# Patient Record
Sex: FEMALE | Race: WHITE | HISPANIC OR LATINO | Employment: UNEMPLOYED | URBAN - METROPOLITAN AREA
[De-identification: names, ages, dates, MRNs, and addresses within clinical notes are randomized per-mention and may not be internally consistent; named-entity substitution may affect disease eponyms.]

---

## 2017-09-18 ENCOUNTER — OFFICE VISIT (OUTPATIENT)
Dept: OBSTETRICS AND GYNECOLOGY | Facility: CLINIC | Age: 57
End: 2017-09-18
Payer: COMMERCIAL

## 2017-09-18 VITALS
SYSTOLIC BLOOD PRESSURE: 102 MMHG | WEIGHT: 134 LBS | HEIGHT: 62 IN | BODY MASS INDEX: 24.66 KG/M2 | DIASTOLIC BLOOD PRESSURE: 64 MMHG

## 2017-09-18 DIAGNOSIS — L73.9 FOLLICULITIS: ICD-10-CM

## 2017-09-18 DIAGNOSIS — Z01.419 WELL WOMAN EXAM WITH ROUTINE GYNECOLOGICAL EXAM: Primary | ICD-10-CM

## 2017-09-18 PROCEDURE — 99396 PREV VISIT EST AGE 40-64: CPT | Mod: S$GLB,,, | Performed by: OBSTETRICS & GYNECOLOGY

## 2017-09-18 PROCEDURE — 88175 CYTOPATH C/V AUTO FLUID REDO: CPT

## 2017-09-18 PROCEDURE — 99999 PR PBB SHADOW E&M-EST. PATIENT-LVL II: CPT | Mod: PBBFAC,,, | Performed by: OBSTETRICS & GYNECOLOGY

## 2017-09-18 RX ORDER — ACETAMINOPHEN 500 MG
5000 TABLET ORAL DAILY
COMMUNITY

## 2017-09-18 RX ORDER — MUPIROCIN CALCIUM 20 MG/G
CREAM TOPICAL
Qty: 30 G | Refills: 0 | Status: SHIPPED | OUTPATIENT
Start: 2017-09-18 | End: 2018-03-21

## 2017-09-18 NOTE — PROGRESS NOTES
CC: Annual check-up    SUBJECTIVE:   57 y.o. female   for annual routine Pap and checkup. No LMP recorded. Patient is postmenopausal..  She complains of getting bumps on skin after shaving.        Past Medical History:   Diagnosis Date    Chicken pox     Hepatitis A     fatty liver    Hyperlipidemia     Shingles      Past Surgical History:   Procedure Laterality Date    eximer laser      MYOMECTOMY       Social History     Social History    Marital status:      Spouse name: N/A    Number of children: N/A    Years of education: N/A     Occupational History    Not on file.     Social History Main Topics    Smoking status: Former Smoker    Smokeless tobacco: Former User    Alcohol use No    Drug use: No    Sexual activity: Yes     Partners: Male     Other Topics Concern    Not on file     Social History Narrative    No narrative on file     Family History   Problem Relation Age of Onset    Cancer Mother     Stomach cancer Mother      OB History    Para Term  AB Living   0 0 0 0 0 0   SAB TAB Ectopic Multiple Live Births   0 0 0 0                 Current Outpatient Prescriptions   Medication Sig Dispense Refill    cholecalciferol, vitamin D3, 5,000 unit Tab Take 5,000 Units by mouth once daily.      mupirocin calcium 2% (BACTROBAN) 2 % cream Apply to affected area 3 times daily 30 g 0     No current facility-administered medications for this visit.      Allergies: Merthiolate (benzalkonium) [benzalkonium chloride]     ROS:  Constitutional: no weight loss, weight gain, fever, fatigue  Eyes:  No vision changes, glasses/contacts  ENT/Mouth: No ulcers, sinus problems, ears ringing, headache  Cardiovascular: No inability to lie flat, chest pain, exercise intolerance, swelling, heart palpitations  Respiratory: No wheezing, coughing blood, shortness of breath, or cough  Gastrointestinal: No diarrhea, bloody stool, nausea/vomiting, constipation, gas, hemorrhoids  Genitourinary:  "No blood in urine, painful urination, urgency of urination, frequency of urination, incomplete emptying, incontinence, abnormal bleeding, painful periods, heavy periods, vaginal discharge, vaginal odor, painful intercourse, sexual problems, bleeding after intercourse.  Musculoskeletal: No muscle weakness  Skin/Breast: No painful breasts, nipple discharge, masses, rash, ulcers  Neurological: No passing out, seizures, numbness, headache  Endocrine: No diabetes, hypothyroid, hyperthyroid, hot flashes, hair loss, abnormal hair growth, ance  Psychiatric: No depression, crying  Hematologic: No bruises, bleeding, swollen lymph nodes, anemia.      OBJECTIVE:   The patient appears well, alert, oriented x 3, in no distress.  /64   Ht 5' 2" (1.575 m)   Wt 60.8 kg (134 lb)   BMI 24.51 kg/m²   NECK: no thyromegaly, trachea midline  SKIN: no acne, striae, hirsutism  BREAST EXAM: breasts appear normal, no suspicious masses, no skin or nipple changes or axillary nodes  ABDOMEN: no hernias, masses, or hepatosplenomegaly  GENITALIA: normal external genitalia, no erythema, no discharge  URETHRA: normal urethra, normal urethral meatus  VAGINA: mucosal atrophy  CERVIX: no lesions or cervical motion tenderness and anterior hard to see  UTERUS: enlarged, 16 weeks size and irregular  ADNEXA: normal adnexa      ASSESSMENT:   well woman  1. Well woman exam with routine gynecological exam    2. Folliculitis        PLAN:   mammogram  pap smear  return annually or prn  Orders Placed This Encounter    Liquid-based pap smear, screening    mupirocin calcium 2% (BACTROBAN) 2 % cream     "

## 2017-11-28 DIAGNOSIS — Z11.59 NEED FOR HEPATITIS C SCREENING TEST: Primary | ICD-10-CM

## 2017-12-21 ENCOUNTER — HOSPITAL ENCOUNTER (OUTPATIENT)
Dept: RADIOLOGY | Facility: HOSPITAL | Age: 57
Discharge: HOME | End: 2017-12-21
Attending: OBSTETRICS & GYNECOLOGY
Payer: COMMERCIAL

## 2017-12-21 VITALS — WEIGHT: 134 LBS | HEIGHT: 62 IN | BODY MASS INDEX: 24.66 KG/M2

## 2017-12-21 DIAGNOSIS — Z01.419 WELL WOMAN EXAM WITH ROUTINE GYNECOLOGICAL EXAM: ICD-10-CM

## 2017-12-21 PROCEDURE — 77067 SCR MAMMO BI INCL CAD: CPT | Mod: TC,PO

## 2018-03-21 ENCOUNTER — OFFICE VISIT (OUTPATIENT)
Dept: OBSTETRICS AND GYNECOLOGY | Facility: CLINIC | Age: 58
End: 2018-03-21
Payer: COMMERCIAL

## 2018-03-21 VITALS
WEIGHT: 136 LBS | SYSTOLIC BLOOD PRESSURE: 110 MMHG | DIASTOLIC BLOOD PRESSURE: 70 MMHG | HEIGHT: 62 IN | BODY MASS INDEX: 25.03 KG/M2

## 2018-03-21 DIAGNOSIS — N95.1 MENOPAUSAL SYMPTOMS: ICD-10-CM

## 2018-03-21 DIAGNOSIS — N95.2 ATROPHIC VAGINITIS: Primary | ICD-10-CM

## 2018-03-21 DIAGNOSIS — Z79.890 HORMONE REPLACEMENT THERAPY (HRT): ICD-10-CM

## 2018-03-21 PROCEDURE — 99213 OFFICE O/P EST LOW 20 MIN: CPT | Mod: S$GLB,,, | Performed by: OBSTETRICS & GYNECOLOGY

## 2018-03-21 PROCEDURE — 99999 PR PBB SHADOW E&M-EST. PATIENT-LVL III: CPT | Mod: PBBFAC,,, | Performed by: OBSTETRICS & GYNECOLOGY

## 2018-03-21 PROCEDURE — 87086 URINE CULTURE/COLONY COUNT: CPT

## 2018-03-21 RX ORDER — ESTRADIOL 0.1 MG/G
CREAM VAGINAL
Qty: 42.5 G | Refills: 6 | Status: SHIPPED | OUTPATIENT
Start: 2018-03-21 | End: 2019-09-30 | Stop reason: SDUPTHER

## 2018-03-21 NOTE — PROGRESS NOTES
"CC:  Chief Complaint   Patient presents with    Urinary Tract Infection     Also complainning of vaginal pain       HPI:    57 y.o.   OB History      Para Term  AB Living    0 0 0 0 0 0    SAB TAB Ectopic Multiple Live Births    0 0 0 0          Complaining of: had uti last weekend took macrobid from urgent care, used yeast cream after that but still has vaginal pain, no d/c      (Not in a hospital admission)    Review of patient's allergies indicates:   Allergen Reactions    Merthiolate (benzalkonium) [benzalkonium chloride]         Past Medical History:   Diagnosis Date    Chicken pox     Hepatitis A     fatty liver    Hyperlipidemia     Shingles      Past Surgical History:   Procedure Laterality Date    eximer laser      MYOMECTOMY       Family History   Problem Relation Age of Onset    Cancer Mother     Stomach cancer Mother      Social History   Substance Use Topics    Smoking status: Former Smoker    Smokeless tobacco: Former User    Alcohol use No     ROS:  GENERAL: Feeling well overall. Denies fever or chills.   SKIN: Denies rash or lesions.   HEAD: Denies head injury or headache.   NODES: Denies enlarged lymph nodes.   CHEST: Denies chest pain or shortness of breath.   CARDIOVASCULAR: Denies palpitations or left sided chest pain.    ABDOMEN: Denies diarrhea, nausea, vomiting or rectal bleeding.   URINARY: No dysuria, hematuria, or burning on urination.  REPRODUCTIVE: See HPI.   BREASTS: Denies pain, lumps, or nipple discharge.   HEMATOLOGIC: No easy bruisability or excessive bleeding.   MUSCULOSKELETAL: Denies joint pain or swelling.   NEUROLOGIC: Denies syncope or weakness.   PSYCHIATRIC: Denies depression, anxiety or mood swings.      PE: /70   Ht 5' 2" (1.575 m)   Wt 61.7 kg (136 lb)   BMI 24.87 kg/m²      APPEARANCE: Well nourished, well developed, in no acute distress.  SKIN: Normal skin turgor, no lesions.  NECK: Neck symmetric without masses or thyromegaly.  NODES: " No inguinal, cervical, axillary or femoral lymph node enlargement.  CARDIOVASCULAR: Normal S1, S2. No rubs, murmurs or gallops.  NEUROLOGIC: Normal mood and affect. No depression or anxiety.   ABDOMEN: Soft. No tenderness or masses. No hepatosplenomegaly. No hernias.  RESPIRATORY: Normal respiratory effort with no retractions or use of accessory muscles.    BLADDER: Non-tender  GENITALIA: normal external genitalia, no erythema, no discharge  URETHRA: normal urethra, normal urethral meatus  VAGINA: mucosal atrophy  CERVIX: no lesions or cervical motion tenderness  UTERUS: normal  ADNEXA: normal adnexa    ASSESSMENT/ PLAN    Rosteta was seen today for urinary tract infection.    Diagnoses and all orders for this visit:    Atrophic vaginitis    Menopausal symptoms    Hormone replacement therapy (HRT)    Other orders  -     estradiol (ESTRACE) 0.01 % (0.1 mg/gram) vaginal cream; Place a pea-sized amount inside vagina every night for 1 week, and then 2-3 times a week.      Recheck response in 4-6 wks  Urine cx to check resolution of uti      Raúl Johnson MD

## 2018-03-22 LAB — BACTERIA UR CULT: NO GROWTH

## 2018-04-26 ENCOUNTER — PATIENT MESSAGE (OUTPATIENT)
Dept: INTERNAL MEDICINE | Facility: CLINIC | Age: 58
End: 2018-04-26

## 2018-05-02 ENCOUNTER — OFFICE VISIT (OUTPATIENT)
Dept: OBSTETRICS AND GYNECOLOGY | Facility: CLINIC | Age: 58
End: 2018-05-02
Payer: COMMERCIAL

## 2018-05-02 VITALS
HEIGHT: 62 IN | DIASTOLIC BLOOD PRESSURE: 72 MMHG | SYSTOLIC BLOOD PRESSURE: 116 MMHG | WEIGHT: 134.38 LBS | BODY MASS INDEX: 24.73 KG/M2

## 2018-05-02 DIAGNOSIS — D22.9 NEVUS: ICD-10-CM

## 2018-05-02 DIAGNOSIS — N95.2 ATROPHIC VAGINITIS: ICD-10-CM

## 2018-05-02 DIAGNOSIS — D25.9 UTERINE LEIOMYOMA, UNSPECIFIED LOCATION: Primary | ICD-10-CM

## 2018-05-02 DIAGNOSIS — Z79.890 HORMONE REPLACEMENT THERAPY (HRT): ICD-10-CM

## 2018-05-02 PROCEDURE — 99213 OFFICE O/P EST LOW 20 MIN: CPT | Mod: S$GLB,,, | Performed by: OBSTETRICS & GYNECOLOGY

## 2018-05-02 PROCEDURE — 99999 PR PBB SHADOW E&M-EST. PATIENT-LVL III: CPT | Mod: PBBFAC,,, | Performed by: OBSTETRICS & GYNECOLOGY

## 2018-05-02 NOTE — PROGRESS NOTES
"CC:f/u estrace  Chief Complaint   Patient presents with    Follow-up       HPI:    58 y.o.   OB History      Para Term  AB Living    0 0 0 0 0 0    SAB TAB Ectopic Multiple Live Births    0 0 0 0          Complaining of: some improvement in vag discomfort with estrace, still feels pressure when gardening and standing for long periods.. Had fibroids and has noticed a new mole on left labia      (Not in a hospital admission)    Review of patient's allergies indicates:   Allergen Reactions    Merthiolate (benzalkonium) [benzalkonium chloride]         Past Medical History:   Diagnosis Date    Chicken pox     Hepatitis A     fatty liver    Hyperlipidemia     Shingles      Past Surgical History:   Procedure Laterality Date    eximer laser      MYOMECTOMY       Family History   Problem Relation Age of Onset    Cancer Mother     Stomach cancer Mother      Social History   Substance Use Topics    Smoking status: Former Smoker    Smokeless tobacco: Former User    Alcohol use No     ROS:  GENERAL: Feeling well overall. Denies fever or chills.   SKIN: Denies rash or lesions.   HEAD: Denies head injury or headache.   NODES: Denies enlarged lymph nodes.   CHEST: Denies chest pain or shortness of breath.   CARDIOVASCULAR: Denies palpitations or left sided chest pain.    ABDOMEN: Denies diarrhea, nausea, vomiting or rectal bleeding.   URINARY: No dysuria, hematuria, or burning on urination.  REPRODUCTIVE: See HPI.   BREASTS: Denies pain, lumps, or nipple discharge.   HEMATOLOGIC: No easy bruisability or excessive bleeding.   MUSCULOSKELETAL: Denies joint pain or swelling.   NEUROLOGIC: Denies syncope or weakness.   PSYCHIATRIC: Denies depression, anxiety or mood swings.      PE: /72   Ht 5' 2" (1.575 m)   Wt 61 kg (134 lb 6.4 oz)   BMI 24.58 kg/m²      APPEARANCE: Well nourished, well developed, in no acute distress.  SKIN: Normal skin turgor, no lesions.  NECK: Neck symmetric without masses or " thyromegaly.  NODES: No inguinal, cervical, axillary or femoral lymph node enlargement.  CARDIOVASCULAR: Normal S1, S2. No rubs, murmurs or gallops.  NEUROLOGIC: Normal mood and affect. No depression or anxiety.   ABDOMEN: Soft. No tenderness or masses. No hepatosplenomegaly. No hernias.  RESPIRATORY: Normal respiratory effort with no retractions or use of accessory muscles.  BREASTS: Symmetrical, no skin changes or visible lesions. No palpable masses, nipple discharge, or adenopathy bilaterally.   BLADDER: Non-tender  GENITALIA: small benign appearing nevus on left mid labia  URETHRA: normal urethra, normal urethral meatus  VAGINA: mucosal atrophy but improved  CERVIX: no lesions or cervical motion tenderness  UTERUS: enlarged, 14-16 weeks size and irregular  ADNEXA: normal adnexa    ASSESSMENT/ PLAN    Rosetta was seen today for follow-up.    Diagnoses and all orders for this visit:    Uterine leiomyoma, unspecified location  -     Cancel: US OB/GYN Procedure ([facility]); Future  -     US Transvaginal Non OB; Future    Atrophic vaginitis    Hormone replacement therapy (HRT)    Nevus      Monitor mole call for changes, discussed ABC's  Cont estrace call for problems  Reassess fibroids size due to increase in sx's of pressure      Raúl Johnson MD

## 2018-05-09 ENCOUNTER — HOSPITAL ENCOUNTER (OUTPATIENT)
Dept: RADIOLOGY | Facility: HOSPITAL | Age: 58
Discharge: HOME | End: 2018-05-09
Attending: OBSTETRICS & GYNECOLOGY
Payer: COMMERCIAL

## 2018-05-09 DIAGNOSIS — D25.9 UTERINE LEIOMYOMA, UNSPECIFIED LOCATION: ICD-10-CM

## 2018-05-09 PROCEDURE — 76830 TRANSVAGINAL US NON-OB: CPT | Mod: TC,PO

## 2018-06-18 ENCOUNTER — OFFICE VISIT (OUTPATIENT)
Dept: INTERNAL MEDICINE | Facility: CLINIC | Age: 58
End: 2018-06-18
Payer: COMMERCIAL

## 2018-06-18 VITALS
WEIGHT: 136.25 LBS | SYSTOLIC BLOOD PRESSURE: 112 MMHG | OXYGEN SATURATION: 97 % | HEIGHT: 62 IN | HEART RATE: 76 BPM | DIASTOLIC BLOOD PRESSURE: 64 MMHG | BODY MASS INDEX: 25.07 KG/M2

## 2018-06-18 DIAGNOSIS — Z00.00 ROUTINE GENERAL MEDICAL EXAMINATION AT A HEALTH CARE FACILITY: Primary | ICD-10-CM

## 2018-06-18 PROCEDURE — 99396 PREV VISIT EST AGE 40-64: CPT | Mod: S$GLB,,, | Performed by: INTERNAL MEDICINE

## 2018-06-18 PROCEDURE — 99999 PR PBB SHADOW E&M-EST. PATIENT-LVL III: CPT | Mod: PBBFAC,,, | Performed by: INTERNAL MEDICINE

## 2018-06-18 NOTE — PROGRESS NOTES
Subjective:       Patient ID: Rosetta Toro is a 58 y.o. female.    Chief Complaint: Annual Exam    HPI  Checkup.  No complaints.  Exercises regularly.  Review of Systems   All other systems reviewed and are negative.      Objective:      Physical Exam   Constitutional: She appears well-developed. No distress.   HENT:   Head: Normocephalic.   Eyes: EOM are normal.   Neck: Normal range of motion. No tracheal deviation present.   Cardiovascular: Normal rate, regular rhythm, normal heart sounds and intact distal pulses.    Pulmonary/Chest: Effort normal and breath sounds normal. No respiratory distress.   Abdominal: Soft. Bowel sounds are normal. She exhibits no distension. There is no tenderness.   Musculoskeletal: Normal range of motion. She exhibits no edema.   Neurological: She is alert. No cranial nerve deficit. She exhibits normal muscle tone. Coordination normal.   Skin: Skin is warm and dry. No rash noted. She is not diaphoretic. No erythema.   Psychiatric: She has a normal mood and affect. Her behavior is normal.   Vitals reviewed.      Assessment:       1. Routine general medical examination at a health care facility        Plan:       Rosetta was seen today for annual exam.    Diagnoses and all orders for this visit:    Routine general medical examination at a health care facility  -     CBC auto differential; Future  -     Comprehensive metabolic panel; Future  -     Hepatitis C antibody; Future  -     Lipid panel; Future  -     Fecal Immunochemical Test (iFOBT); Future      Follow-up in about 1 year (around 6/18/2019).

## 2018-06-20 ENCOUNTER — LAB VISIT (OUTPATIENT)
Dept: LAB | Facility: HOSPITAL | Age: 58
End: 2018-06-20
Attending: INTERNAL MEDICINE
Payer: COMMERCIAL

## 2018-06-20 DIAGNOSIS — Z00.00 ROUTINE GENERAL MEDICAL EXAMINATION AT A HEALTH CARE FACILITY: ICD-10-CM

## 2018-06-20 DIAGNOSIS — Z11.59 NEED FOR HEPATITIS C SCREENING TEST: ICD-10-CM

## 2018-06-20 LAB
ALBUMIN SERPL BCP-MCNC: 4.3 G/DL
ALP SERPL-CCNC: 66 U/L
ALT SERPL W/O P-5'-P-CCNC: 37 U/L
ANION GAP SERPL CALC-SCNC: 12 MMOL/L
AST SERPL-CCNC: 38 U/L
BASOPHILS # BLD AUTO: 0.03 K/UL
BASOPHILS NFR BLD: 0.6 %
BILIRUB SERPL-MCNC: 0.4 MG/DL
BUN SERPL-MCNC: 16 MG/DL
CALCIUM SERPL-MCNC: 9.5 MG/DL
CHLORIDE SERPL-SCNC: 103 MMOL/L
CHOLEST SERPL-MCNC: 267 MG/DL
CHOLEST/HDLC SERPL: 4 {RATIO}
CO2 SERPL-SCNC: 28 MMOL/L
CREAT SERPL-MCNC: 0.73 MG/DL
DIFFERENTIAL METHOD: NORMAL
EOSINOPHIL # BLD AUTO: 0.3 K/UL
EOSINOPHIL NFR BLD: 5.8 %
ERYTHROCYTE [DISTWIDTH] IN BLOOD BY AUTOMATED COUNT: 14.4 %
EST. GFR  (AFRICAN AMERICAN): >60 ML/MIN/1.73 M^2
EST. GFR  (NON AFRICAN AMERICAN): >60 ML/MIN/1.73 M^2
GLUCOSE SERPL-MCNC: 112 MG/DL
HCT VFR BLD AUTO: 42 %
HDLC SERPL-MCNC: 67 MG/DL
HDLC SERPL: 25.1 %
HGB BLD-MCNC: 13.7 G/DL
LDLC SERPL CALC-MCNC: 177.2 MG/DL
LYMPHOCYTES # BLD AUTO: 1.4 K/UL
LYMPHOCYTES NFR BLD: 27.4 %
MCH RBC QN AUTO: 28.6 PG
MCHC RBC AUTO-ENTMCNC: 32.6 G/DL
MCV RBC AUTO: 88 FL
MONOCYTES # BLD AUTO: 0.5 K/UL
MONOCYTES NFR BLD: 8.6 %
NEUTROPHILS # BLD AUTO: 3 K/UL
NEUTROPHILS NFR BLD: 57.6 %
NONHDLC SERPL-MCNC: 200 MG/DL
PLATELET # BLD AUTO: 253 K/UL
PMV BLD AUTO: 11.5 FL
POTASSIUM SERPL-SCNC: 4.3 MMOL/L
PROT SERPL-MCNC: 7.8 G/DL
RBC # BLD AUTO: 4.79 M/UL
SODIUM SERPL-SCNC: 143 MMOL/L
TRIGL SERPL-MCNC: 114 MG/DL
WBC # BLD AUTO: 5.21 K/UL

## 2018-06-20 PROCEDURE — 86803 HEPATITIS C AB TEST: CPT | Mod: PO

## 2018-06-20 PROCEDURE — 36415 COLL VENOUS BLD VENIPUNCTURE: CPT | Mod: PO

## 2018-06-20 PROCEDURE — 85025 COMPLETE CBC W/AUTO DIFF WBC: CPT | Mod: PO

## 2018-06-20 PROCEDURE — 80053 COMPREHEN METABOLIC PANEL: CPT | Mod: PO

## 2018-06-20 PROCEDURE — 80061 LIPID PANEL: CPT

## 2018-06-21 LAB
HCV AB SERPL QL IA: NEGATIVE
HCV AB SERPL QL IA: NEGATIVE

## 2018-06-26 ENCOUNTER — LAB VISIT (OUTPATIENT)
Dept: LAB | Facility: HOSPITAL | Age: 58
End: 2018-06-26
Attending: INTERNAL MEDICINE
Payer: COMMERCIAL

## 2018-06-26 DIAGNOSIS — Z00.00 ROUTINE GENERAL MEDICAL EXAMINATION AT A HEALTH CARE FACILITY: ICD-10-CM

## 2018-06-26 LAB — HEMOCCULT STL QL IA: NEGATIVE

## 2018-06-26 PROCEDURE — 82274 ASSAY TEST FOR BLOOD FECAL: CPT

## 2018-09-24 ENCOUNTER — OFFICE VISIT (OUTPATIENT)
Dept: OBSTETRICS AND GYNECOLOGY | Facility: CLINIC | Age: 58
End: 2018-09-24
Payer: COMMERCIAL

## 2018-09-24 VITALS
WEIGHT: 132.38 LBS | HEIGHT: 62 IN | SYSTOLIC BLOOD PRESSURE: 106 MMHG | BODY MASS INDEX: 24.36 KG/M2 | DIASTOLIC BLOOD PRESSURE: 74 MMHG

## 2018-09-24 DIAGNOSIS — D25.9 UTERINE LEIOMYOMA, UNSPECIFIED LOCATION: ICD-10-CM

## 2018-09-24 DIAGNOSIS — Z01.419 WELL WOMAN EXAM WITH ROUTINE GYNECOLOGICAL EXAM: Primary | ICD-10-CM

## 2018-09-24 DIAGNOSIS — R10.2 PELVIC PAIN: ICD-10-CM

## 2018-09-24 PROCEDURE — 88175 CYTOPATH C/V AUTO FLUID REDO: CPT

## 2018-09-24 PROCEDURE — 87086 URINE CULTURE/COLONY COUNT: CPT

## 2018-09-24 PROCEDURE — 99396 PREV VISIT EST AGE 40-64: CPT | Mod: S$GLB,,, | Performed by: OBSTETRICS & GYNECOLOGY

## 2018-09-24 PROCEDURE — 99999 PR PBB SHADOW E&M-EST. PATIENT-LVL III: CPT | Mod: PBBFAC,,, | Performed by: OBSTETRICS & GYNECOLOGY

## 2018-09-24 NOTE — PROGRESS NOTES
CC: Annual check-up    SUBJECTIVE:   58 y.o. female   for annual routine Pap and checkup. No LMP recorded. Patient is postmenopausal.  Reports intermittent pelvic pain mostly when runs or does gardening, 1-3 times per week, non-radiating, no burning with urination, no pain with sex, no discharge.  Has a h/o uterine fibroids largest 3.8 cm.  She's post menopausal and on estrogen.  Denies smoking.     Past Medical History:   Diagnosis Date    Chicken pox     Hepatitis A     fatty liver    Hyperlipidemia     Shingles      Past Surgical History:   Procedure Laterality Date    eximer laser      MYOMECTOMY       Social History     Socioeconomic History    Marital status:      Spouse name: Not on file    Number of children: Not on file    Years of education: Not on file    Highest education level: Not on file   Social Needs    Financial resource strain: Not on file    Food insecurity - worry: Not on file    Food insecurity - inability: Not on file    Transportation needs - medical: Not on file    Transportation needs - non-medical: Not on file   Occupational History    Not on file   Tobacco Use    Smoking status: Former Smoker    Smokeless tobacco: Former User   Substance and Sexual Activity    Alcohol use: No    Drug use: No    Sexual activity: No   Other Topics Concern    Not on file   Social History Narrative    Not on file     Family History   Problem Relation Age of Onset    Cancer Mother     Stomach cancer Mother      OB History    Para Term  AB Living   0 0 0 0 0 0   SAB TAB Ectopic Multiple Live Births   0 0 0 0                 Current Outpatient Medications   Medication Sig Dispense Refill    cholecalciferol, vitamin D3, 5,000 unit Tab Take 5,000 Units by mouth once daily.      estradiol (ESTRACE) 0.01 % (0.1 mg/gram) vaginal cream Place a pea-sized amount inside vagina every night for 1 week, and then 2-3 times a week. 42.5 g 6     No current  "facility-administered medications for this visit.      Allergies: Merthiolate (benzalkonium) [benzalkonium chloride]     ROS:  Constitutional: no weight loss, weight gain, fever, fatigue  Eyes:  No vision changes, glasses/contacts  ENT/Mouth: No ulcers, sinus problems, ears ringing, headache  Cardiovascular: No inability to lie flat, chest pain, exercise intolerance, swelling, heart palpitations  Respiratory: No wheezing, coughing blood, shortness of breath, or cough  Gastrointestinal: No diarrhea, bloody stool, nausea/vomiting, constipation, gas, hemorrhoids  Genitourinary: No blood in urine, painful urination, urgency of urination, frequency of urination, incomplete emptying, incontinence, abnormal bleeding, painful periods, heavy periods, vaginal discharge, vaginal odor, painful intercourse, sexual problems, bleeding after intercourse.  Musculoskeletal: No muscle weakness  Skin/Breast: No painful breasts, nipple discharge, masses, rash, ulcers  Neurological: No passing out, seizures, numbness, headache  Endocrine: No diabetes, hypothyroid, hyperthyroid, hot flashes, hair loss, abnormal hair growth, ance  Psychiatric: No depression, crying  Hematologic: No bruises, bleeding, swollen lymph nodes, anemia.      OBJECTIVE:   The patient appears well, alert, oriented x 3, in no distress.  /74   Ht 5' 2" (1.575 m)   Wt 60.1 kg (132 lb 6.4 oz)   BMI 24.22 kg/m²   NECK: no thyromegaly, trachea midline  SKIN: no acne, striae, hirsutism  BREAST EXAM: breasts appear normal, no suspicious masses, no skin or nipple changes or axillary nodes  ABDOMEN: no hernias, masses, or hepatosplenomegaly  GENITALIA: normal external genitalia, no erythema, no discharge  URETHRA: normal urethra, normal urethral meatus  VAGINA: Normal  CERVIX: no lesions or cervical motion tenderness  UTERUS: 14 wks large uterus.  Fibroid on anterior small about size of marble.  Big lateral fibroids and a retroflex uterus  ADNEXA: normal " adnexa      ASSESSMENT:   well woman  1. Well woman exam with routine gynecological exam    2. Pelvic pain    3. Uterine leiomyoma, unspecified location        PLAN:   pap smear  U/A negative for UTI, sent for culture  Mammogram ordered for 12/2018  NSAIDs for pelvic pain.  Discussed possible surgery.  Risks and benefits discussed   Continue estrogen    Return in 1 year or PRN

## 2018-09-26 LAB — BACTERIA UR CULT: NORMAL

## 2018-12-27 ENCOUNTER — HOSPITAL ENCOUNTER (OUTPATIENT)
Dept: RADIOLOGY | Facility: HOSPITAL | Age: 58
Discharge: HOME | End: 2018-12-27
Attending: OBSTETRICS & GYNECOLOGY
Payer: COMMERCIAL

## 2018-12-27 DIAGNOSIS — Z01.419 WELL WOMAN EXAM WITH ROUTINE GYNECOLOGICAL EXAM: ICD-10-CM

## 2018-12-27 PROCEDURE — 77063 BREAST TOMOSYNTHESIS BI: CPT | Mod: TC,PO

## 2019-07-05 DIAGNOSIS — Z12.11 COLON CANCER SCREENING: ICD-10-CM

## 2019-09-05 ENCOUNTER — LAB VISIT (OUTPATIENT)
Dept: LAB | Facility: HOSPITAL | Age: 59
End: 2019-09-05
Attending: INTERNAL MEDICINE
Payer: COMMERCIAL

## 2019-09-05 ENCOUNTER — OFFICE VISIT (OUTPATIENT)
Dept: INTERNAL MEDICINE | Facility: CLINIC | Age: 59
End: 2019-09-05
Payer: COMMERCIAL

## 2019-09-05 VITALS
HEIGHT: 62 IN | WEIGHT: 127 LBS | BODY MASS INDEX: 23.37 KG/M2 | OXYGEN SATURATION: 96 % | SYSTOLIC BLOOD PRESSURE: 120 MMHG | DIASTOLIC BLOOD PRESSURE: 60 MMHG | HEART RATE: 95 BPM | TEMPERATURE: 99 F

## 2019-09-05 DIAGNOSIS — Z00.00 ROUTINE GENERAL MEDICAL EXAMINATION AT A HEALTH CARE FACILITY: ICD-10-CM

## 2019-09-05 DIAGNOSIS — Z00.00 ROUTINE GENERAL MEDICAL EXAMINATION AT A HEALTH CARE FACILITY: Primary | ICD-10-CM

## 2019-09-05 LAB
ESTIMATED AVG GLUCOSE: 126 MG/DL (ref 68–131)
HBA1C MFR BLD HPLC: 6 % (ref 4–5.6)

## 2019-09-05 PROCEDURE — 90715 TDAP VACCINE 7 YRS/> IM: CPT | Mod: S$GLB,,, | Performed by: INTERNAL MEDICINE

## 2019-09-05 PROCEDURE — 83036 HEMOGLOBIN GLYCOSYLATED A1C: CPT

## 2019-09-05 PROCEDURE — 99999 PR PBB SHADOW E&M-EST. PATIENT-LVL III: ICD-10-PCS | Mod: PBBFAC,,, | Performed by: INTERNAL MEDICINE

## 2019-09-05 PROCEDURE — 90715 TDAP VACCINE GREATER THAN OR EQUAL TO 7YO IM: ICD-10-PCS | Mod: S$GLB,,, | Performed by: INTERNAL MEDICINE

## 2019-09-05 PROCEDURE — 90472 IMMUNIZATION ADMIN EACH ADD: CPT | Mod: S$GLB,,, | Performed by: INTERNAL MEDICINE

## 2019-09-05 PROCEDURE — 99396 PR PREVENTIVE VISIT,EST,40-64: ICD-10-PCS | Mod: 25,S$GLB,, | Performed by: INTERNAL MEDICINE

## 2019-09-05 PROCEDURE — 90471 FLU VACCINE (QUAD) GREATER THAN OR EQUAL TO 3YO PRESERVATIVE FREE IM: ICD-10-PCS | Mod: S$GLB,,, | Performed by: INTERNAL MEDICINE

## 2019-09-05 PROCEDURE — 90686 IIV4 VACC NO PRSV 0.5 ML IM: CPT | Mod: S$GLB,,, | Performed by: INTERNAL MEDICINE

## 2019-09-05 PROCEDURE — 99999 PR PBB SHADOW E&M-EST. PATIENT-LVL III: CPT | Mod: PBBFAC,,, | Performed by: INTERNAL MEDICINE

## 2019-09-05 PROCEDURE — 90686 FLU VACCINE (QUAD) GREATER THAN OR EQUAL TO 3YO PRESERVATIVE FREE IM: ICD-10-PCS | Mod: S$GLB,,, | Performed by: INTERNAL MEDICINE

## 2019-09-05 PROCEDURE — 36415 COLL VENOUS BLD VENIPUNCTURE: CPT | Mod: PO

## 2019-09-05 PROCEDURE — 90471 IMMUNIZATION ADMIN: CPT | Mod: S$GLB,,, | Performed by: INTERNAL MEDICINE

## 2019-09-05 PROCEDURE — 90472 TDAP VACCINE GREATER THAN OR EQUAL TO 7YO IM: ICD-10-PCS | Mod: S$GLB,,, | Performed by: INTERNAL MEDICINE

## 2019-09-05 PROCEDURE — 99396 PREV VISIT EST AGE 40-64: CPT | Mod: 25,S$GLB,, | Performed by: INTERNAL MEDICINE

## 2019-09-05 NOTE — PROGRESS NOTES
Subjective:       Patient ID: Rosetta Toro is a 59 y.o. female.    Chief Complaint: Annual Exam    HPI  Wellness check.  Chart reviewed.  Wt down 6 lbs/ 1 yr.  No complaints.  Review of Systems   All other systems reviewed and are negative.      Objective:      Physical Exam   Constitutional: She appears well-developed.   HENT:   Head: Normocephalic.   Eyes: EOM are normal.   Neck: Normal range of motion.   Cardiovascular: Normal rate, regular rhythm, normal heart sounds and intact distal pulses.   Pulmonary/Chest: Effort normal and breath sounds normal.   Abdominal: Soft. Bowel sounds are normal. She exhibits no distension.   Musculoskeletal: Normal range of motion.   Lymphadenopathy:     She has no cervical adenopathy.   Neurological: She is alert. No cranial nerve deficit. She exhibits normal muscle tone. Coordination normal.   Skin: Skin is warm and dry.   Psychiatric: She has a normal mood and affect. Her behavior is normal.   Vitals reviewed.      Assessment:       1. Routine general medical examination at a health care facility        Plan:       Rosetta was seen today for annual exam.    Diagnoses and all orders for this visit:    Routine general medical examination at a health care facility  -     Influenza - Quadrivalent (3 years & older) (PF)  -     Tdap Vaccine  -     Hemoglobin A1c; Future      Follow up if symptoms worsen or fail to improve.

## 2019-09-27 ENCOUNTER — PATIENT OUTREACH (OUTPATIENT)
Dept: ADMINISTRATIVE | Facility: OTHER | Age: 59
End: 2019-09-27

## 2019-09-30 ENCOUNTER — OFFICE VISIT (OUTPATIENT)
Dept: OBSTETRICS AND GYNECOLOGY | Facility: CLINIC | Age: 59
End: 2019-09-30
Payer: COMMERCIAL

## 2019-09-30 VITALS
WEIGHT: 125.38 LBS | SYSTOLIC BLOOD PRESSURE: 118 MMHG | BODY MASS INDEX: 22.94 KG/M2 | DIASTOLIC BLOOD PRESSURE: 70 MMHG

## 2019-09-30 DIAGNOSIS — Z12.39 BREAST CANCER SCREENING: ICD-10-CM

## 2019-09-30 DIAGNOSIS — Z01.419 WELL WOMAN EXAM WITH ROUTINE GYNECOLOGICAL EXAM: Primary | ICD-10-CM

## 2019-09-30 DIAGNOSIS — N90.89 VULVAR LESION: ICD-10-CM

## 2019-09-30 PROCEDURE — 88175 CYTOPATH C/V AUTO FLUID REDO: CPT

## 2019-09-30 PROCEDURE — 88304 TISSUE SPECIMEN TO PATHOLOGY, OBSTETRICS/GYNECOLOGY: ICD-10-PCS | Mod: 26,,, | Performed by: PATHOLOGY

## 2019-09-30 PROCEDURE — 99999 PR PBB SHADOW E&M-EST. PATIENT-LVL III: CPT | Mod: PBBFAC,,, | Performed by: OBSTETRICS & GYNECOLOGY

## 2019-09-30 PROCEDURE — 11106 PR INCISIONAL BIOPSY, SKIN, SINGLE LESION: ICD-10-PCS | Mod: S$GLB,,, | Performed by: OBSTETRICS & GYNECOLOGY

## 2019-09-30 PROCEDURE — 99396 PREV VISIT EST AGE 40-64: CPT | Mod: S$GLB,,, | Performed by: OBSTETRICS & GYNECOLOGY

## 2019-09-30 PROCEDURE — 88304 TISSUE EXAM BY PATHOLOGIST: CPT | Performed by: PATHOLOGY

## 2019-09-30 PROCEDURE — 88304 TISSUE EXAM BY PATHOLOGIST: CPT | Mod: 26,,, | Performed by: PATHOLOGY

## 2019-09-30 PROCEDURE — 99396 PR PREVENTIVE VISIT,EST,40-64: ICD-10-PCS | Mod: S$GLB,,, | Performed by: OBSTETRICS & GYNECOLOGY

## 2019-09-30 PROCEDURE — 11106 INCAL BX SKN SINGLE LES: CPT | Mod: S$GLB,,, | Performed by: OBSTETRICS & GYNECOLOGY

## 2019-09-30 PROCEDURE — 99999 PR PBB SHADOW E&M-EST. PATIENT-LVL III: ICD-10-PCS | Mod: PBBFAC,,, | Performed by: OBSTETRICS & GYNECOLOGY

## 2019-09-30 RX ORDER — ESTRADIOL 0.1 MG/G
CREAM VAGINAL
Qty: 42.5 G | Refills: 6 | Status: SHIPPED | OUTPATIENT
Start: 2019-09-30 | End: 2021-11-03 | Stop reason: SDUPTHER

## 2019-09-30 NOTE — PROGRESS NOTES
CC: Annual check-up    SUBJECTIVE:   59 y.o. female   for annual routine Pap and checkup. No LMP recorded (lmp unknown). Patient is postmenopausal..  She complains of small wart on left labia that wants removed.    Recurrent UTI's resolved with Vaginal E2, needs refill    Past Medical History:   Diagnosis Date    Chicken pox     Hepatitis A     fatty liver    Hyperlipidemia     Shingles      Past Surgical History:   Procedure Laterality Date    eximer laser      MYOMECTOMY       Social History     Socioeconomic History    Marital status:      Spouse name: Not on file    Number of children: Not on file    Years of education: Not on file    Highest education level: Not on file   Occupational History    Not on file   Social Needs    Financial resource strain: Not on file    Food insecurity:     Worry: Not on file     Inability: Not on file    Transportation needs:     Medical: Not on file     Non-medical: Not on file   Tobacco Use    Smoking status: Former Smoker    Smokeless tobacco: Former User   Substance and Sexual Activity    Alcohol use: No    Drug use: No    Sexual activity: Never   Lifestyle    Physical activity:     Days per week: Not on file     Minutes per session: Not on file    Stress: Not at all   Relationships    Social connections:     Talks on phone: Not on file     Gets together: Not on file     Attends Hindu service: Not on file     Active member of club or organization: Not on file     Attends meetings of clubs or organizations: Not on file     Relationship status: Not on file   Other Topics Concern    Not on file   Social History Narrative    Not on file     Family History   Problem Relation Age of Onset    Cancer Mother     Stomach cancer Mother      OB History    Para Term  AB Living   0 0 0 0 0 0   SAB TAB Ectopic Multiple Live Births   0 0 0 0           Current Outpatient Medications   Medication Sig Dispense Refill    cholecalciferol,  vitamin D3, 5,000 unit Tab Take 5,000 Units by mouth once daily.      estradiol (ESTRACE) 0.01 % (0.1 mg/gram) vaginal cream Place a pea-sized amount inside vagina every night for 1 week, and then 2-3 times a week. 42.5 g 6     No current facility-administered medications for this visit.      Allergies: Merthiolate (benzalkonium) [benzalkonium chloride]     ROS:  Constitutional: no weight loss, weight gain, fever, fatigue  Eyes:  No vision changes, glasses/contacts  ENT/Mouth: No ulcers, sinus problems, ears ringing, headache  Cardiovascular: No inability to lie flat, chest pain, exercise intolerance, swelling, heart palpitations  Respiratory: No wheezing, coughing blood, shortness of breath, or cough  Gastrointestinal: No diarrhea, bloody stool, nausea/vomiting, constipation, gas, hemorrhoids  Genitourinary: No blood in urine, painful urination, urgency of urination, frequency of urination, incomplete emptying, incontinence, abnormal bleeding, painful periods, heavy periods, vaginal discharge, vaginal odor, painful intercourse, sexual problems, bleeding after intercourse.  Musculoskeletal: No muscle weakness  Skin/Breast: No painful breasts, nipple discharge, masses, rash, ulcers  Neurological: No passing out, seizures, numbness, headache  Endocrine: No diabetes, hypothyroid, hyperthyroid, hot flashes, hair loss, abnormal hair growth, ance  Psychiatric: No depression, crying  Hematologic: No bruises, bleeding, swollen lymph nodes, anemia.      OBJECTIVE:   The patient appears well, alert, oriented x 3, in no distress.  /70   Wt 56.9 kg (125 lb 6.4 oz)   LMP  (LMP Unknown)   BMI 22.94 kg/m²   NECK: no thyromegaly, trachea midline  SKIN: no acne, striae, hirsutism  BREAST EXAM: breasts appear normal, no suspicious masses, no skin or nipple changes or axillary nodes  Procedure Note    ABDOMEN: no hernias, masses, or hepatosplenomegaly  GENITALIA: 1mm discolored lesion on l;eft lower vulva that bleeds easily    Area prepped with betadine  1.5cc 1% lidocaone with epi inj SubQ  Lesion excised with #11 blade and sent for perm section  AgNO3 apllied for good hemostasis. Pt issa well      URETHRA: normal urethra, normal urethral meatus  VAGINA: bluish mucosa  CERVIX: no lesions or cervical motion tenderness  UTERUS: enlarged, 16 weeks size and irregular  ADNEXA: normal adnexa      ASSESSMENT:   well woman  no contraindication to continue hormonal therapy  1. Well woman exam with routine gynecological exam    2. Breast cancer screening    3. Vulvar lesion        PLAN:   mammogram  pap smear  return annually or prn  Orders Placed This Encounter    Mammo Digital Screening Bilat w/ Alberto    Liquid-based pap smear, screening    Tissue Specimen To Pathology, Obstetrics/Gynecology    estradiol (ESTRACE) 0.01 % (0.1 mg/gram) vaginal cream

## 2019-10-09 ENCOUNTER — PATIENT MESSAGE (OUTPATIENT)
Dept: NEUROSURGERY | Facility: CLINIC | Age: 59
End: 2019-10-09

## 2019-10-16 ENCOUNTER — PATIENT OUTREACH (OUTPATIENT)
Dept: ADMINISTRATIVE | Facility: HOSPITAL | Age: 59
End: 2019-10-16

## 2020-02-28 ENCOUNTER — HOSPITAL ENCOUNTER (OUTPATIENT)
Dept: RADIOLOGY | Facility: HOSPITAL | Age: 60
Discharge: HOME | End: 2020-02-28
Attending: OBSTETRICS & GYNECOLOGY
Payer: COMMERCIAL

## 2020-02-28 DIAGNOSIS — Z12.39 BREAST CANCER SCREENING: ICD-10-CM

## 2020-02-28 PROCEDURE — 77067 SCR MAMMO BI INCL CAD: CPT | Mod: TC,PO

## 2020-05-26 ENCOUNTER — PATIENT OUTREACH (OUTPATIENT)
Dept: ADMINISTRATIVE | Facility: HOSPITAL | Age: 60
End: 2020-05-26

## 2020-07-10 DIAGNOSIS — Z12.11 COLON CANCER SCREENING: ICD-10-CM

## 2020-10-05 ENCOUNTER — PATIENT MESSAGE (OUTPATIENT)
Dept: INTERNAL MEDICINE | Facility: CLINIC | Age: 60
End: 2020-10-05

## 2020-10-20 ENCOUNTER — OFFICE VISIT (OUTPATIENT)
Dept: PODIATRY | Facility: CLINIC | Age: 60
End: 2020-10-20
Payer: COMMERCIAL

## 2020-10-20 VITALS
DIASTOLIC BLOOD PRESSURE: 69 MMHG | TEMPERATURE: 98 F | WEIGHT: 129 LBS | SYSTOLIC BLOOD PRESSURE: 144 MMHG | HEART RATE: 88 BPM | HEIGHT: 62 IN | BODY MASS INDEX: 23.74 KG/M2

## 2020-10-20 DIAGNOSIS — D36.10 NEUROMA: ICD-10-CM

## 2020-10-20 DIAGNOSIS — M20.11 HALLUX VALGUS OF RIGHT FOOT: Primary | ICD-10-CM

## 2020-10-20 PROCEDURE — 99999 PR PBB SHADOW E&M-EST. PATIENT-LVL III: ICD-10-PCS | Mod: PBBFAC,,, | Performed by: PODIATRIST

## 2020-10-20 PROCEDURE — 99999 PR PBB SHADOW E&M-EST. PATIENT-LVL III: CPT | Mod: PBBFAC,,, | Performed by: PODIATRIST

## 2020-10-20 PROCEDURE — 99203 PR OFFICE/OUTPT VISIT, NEW, LEVL III, 30-44 MIN: ICD-10-PCS | Mod: S$GLB,,, | Performed by: PODIATRIST

## 2020-10-20 PROCEDURE — 99203 OFFICE O/P NEW LOW 30 MIN: CPT | Mod: S$GLB,,, | Performed by: PODIATRIST

## 2020-10-20 NOTE — LETTER
October 24, 2020      Jose Guadalupe Ramey MD  [address]  [address]  [place] [address]           [facility] - Podiatry/Wound [facility]  [facility] [address]  Phone: [phone]  Fax: [phone]          Patient: Rosetta Toro   MR Number: [id number]   YOB: 1960   Date of Visit: 10/20/2020       Dear Dr. Jose Guadalupe Ramey:    Thank you for referring Rosetta Toro to me for evaluation. Attached you will find relevant portions of my assessment and plan of care.    If you have questions, please do not hesitate to call me. I look forward to following Rosetta Toro along with you.    Sincerely,    Jl Banegas, YASMINE    Enclosure  CC:  No Recipients    If you would like to receive this communication electronically, please contact [email or web address] or [phone] to request more information on [employer] Link access.    For providers and/or their staff who would like to refer a patient to [facility], please contact us through our one-stop-shop provider referral line, [facility], at [phone].    If you feel you have received this communication in error or would no longer like to receive these types of communications, please e-mail [email or web address]

## 2020-10-24 PROBLEM — D36.10 NEUROMA: Status: ACTIVE | Noted: 2020-10-24

## 2020-10-24 PROBLEM — M20.11 HALLUX VALGUS OF RIGHT FOOT: Status: ACTIVE | Noted: 2020-10-24

## 2020-10-25 NOTE — PROGRESS NOTES
Subjective:       Patient ID: Rosetta Toro is a 60 y.o. female.    Chief Complaint: Foot Pain and Foot Problem   Patient presents today for new patient evaluation she states that she is a runner and runs daily she is started to have pain related to her bunion on her right foot and she is also having burning discomfort on both feet.  Patient states the burning in the numbness that she experiences in her right foot also involves the bunion but it involves the area between the toes additionally.  Patient relates no injury or trauma other than her running for exercise.    Past Medical History:   Diagnosis Date    Chicken pox     Hepatitis A     fatty liver    Hyperlipidemia     Shingles      Past Surgical History:   Procedure Laterality Date    eximer laser      MYOMECTOMY       Family History   Problem Relation Age of Onset    Cancer Mother     Stomach cancer Mother      Social History     Socioeconomic History    Marital status:      Spouse name: Not on file    Number of children: Not on file    Years of education: Not on file    Highest education level: Not on file   Occupational History    Not on file   Social Needs    Financial resource strain: Not on file    Food insecurity     Worry: Not on file     Inability: Not on file    Transportation needs     Medical: Not on file     Non-medical: Not on file   Tobacco Use    Smoking status: Former Smoker    Smokeless tobacco: Former User   Substance and Sexual Activity    Alcohol use: No    Drug use: No    Sexual activity: Never   Lifestyle    Physical activity     Days per week: Not on file     Minutes per session: Not on file    Stress: Not at all   Relationships    Social connections     Talks on phone: Not on file     Gets together: Not on file     Attends Shinto service: Not on file     Active member of club or organization: Not on file     Attends meetings of clubs or organizations: Not on file     Relationship status: Not on  "file   Other Topics Concern    Not on file   Social History Narrative    Not on file       Current Outpatient Medications   Medication Sig Dispense Refill    cholecalciferol, vitamin D3, 5,000 unit Tab Take 5,000 Units by mouth once daily.      estradiol (ESTRACE) 0.01 % (0.1 mg/gram) vaginal cream Place a pea-sized amount inside vagina every night for 1 week, and then 2-3 times a week. (Patient not taking: Reported on 10/20/2020) 42.5 g 6     No current facility-administered medications for this visit.      Review of patient's allergies indicates:   Allergen Reactions    Merthiolate (benzalkonium) [benzalkonium chloride]        Review of Systems   Musculoskeletal: Positive for arthralgias and joint swelling.   All other systems reviewed and are negative.      Objective:      Vitals:    10/20/20 1548   BP: (!) 144/69   Pulse: 88   Temp: 98.4 °F (36.9 °C)   Weight: 58.5 kg (129 lb)   Height: 5' 2" (1.575 m)     Physical Exam  Vitals signs and nursing note reviewed.   Constitutional:       Appearance: Normal appearance.   Cardiovascular:      Pulses: Normal pulses.           Dorsalis pedis pulses are 2+ on the right side and 2+ on the left side.        Posterior tibial pulses are 2+ on the right side and 2+ on the left side.   Musculoskeletal: Normal range of motion.         General: Tenderness present.      Right foot: Bunion present.        Feet:    Feet:      Right foot:      Skin integrity: Erythema and warmth present.   Skin:     General: Skin is warm.      Capillary Refill: Capillary refill takes less than 2 seconds.   Neurological:      General: No focal deficit present.      Mental Status: She is alert.   Psychiatric:         Mood and Affect: Mood normal.         Behavior: Behavior normal.         Thought Content: Thought content normal.         Judgment: Judgment normal.              Assessment:       1. Hallux valgus of right foot    2. Neuroma        Plan:       Patient presents today for new patient " evaluation she states that she is a runner and runs daily she is started to have pain related to her bunion on her right foot and she is also having burning discomfort on both feet.  Patient states the burning in the numbness that she experiences in her right foot also involves the bunion but it involves the area between the toes additionally.  Patient relates no injury or trauma other than her running for exercise.  On evaluation patient definitely has degenerative changes appreciated bilateral 1st MPJ right greater than left she does also have a bunion deformity on the right foot there is obvious irritation with erythema and edema overlying this area.  Patient has good range of motion no pain on range of motion she does get a little bit of discomfort with pain overlying the medial eminence.  Patient has obvious signs of neuroma pain plantar forefoot bilateral right greater than left I did advised her this is very common in the presence of a bunion certainly be her being a runner she is on the balls of her feet when she is running and this is likely being caused by lack of appropriate support when she is doing her running she states really the only time it is hurting as when she is running.  Patient was dispensed diabetic insoles she is going to start using these in a running shoes take out the insole the comes in I want to see how she responds to these and will see her for follow-up in several weeks I will consider additional arch support or a metatarsal pad to help with the nerve related pain I did advised the patient the increased arch may also help to take some pressure off of the bunion deformity right.  Patient was in understanding and agreement with this today plan follow-up will be several weeks depending upon how the patient is doing.  Patient was made aware it is important that she continues to be physically active and we want to keep her being physically active so we need to get these problems under  control immediately.  Face-to-face time equaled 30 min this includes getting the patient appropriate sized power step arch supports.This note was created using [employer] voice recognition software that occasionally misinterpreted phrases or words.

## 2020-10-28 ENCOUNTER — OFFICE VISIT (OUTPATIENT)
Dept: INTERNAL MEDICINE | Facility: CLINIC | Age: 60
End: 2020-10-28
Payer: COMMERCIAL

## 2020-10-28 ENCOUNTER — OFFICE VISIT (OUTPATIENT)
Dept: OBSTETRICS AND GYNECOLOGY | Facility: CLINIC | Age: 60
End: 2020-10-28
Payer: COMMERCIAL

## 2020-10-28 ENCOUNTER — LAB VISIT (OUTPATIENT)
Dept: LAB | Facility: HOSPITAL | Age: 60
End: 2020-10-28
Attending: INTERNAL MEDICINE
Payer: COMMERCIAL

## 2020-10-28 VITALS
DIASTOLIC BLOOD PRESSURE: 66 MMHG | SYSTOLIC BLOOD PRESSURE: 112 MMHG | HEIGHT: 62 IN | BODY MASS INDEX: 24.07 KG/M2 | WEIGHT: 130.81 LBS

## 2020-10-28 VITALS
SYSTOLIC BLOOD PRESSURE: 118 MMHG | DIASTOLIC BLOOD PRESSURE: 70 MMHG | HEIGHT: 62 IN | HEART RATE: 79 BPM | WEIGHT: 131.06 LBS | TEMPERATURE: 98 F | OXYGEN SATURATION: 99 % | BODY MASS INDEX: 24.12 KG/M2

## 2020-10-28 DIAGNOSIS — R73.03 PREDIABETES: ICD-10-CM

## 2020-10-28 DIAGNOSIS — M19.91 PRIMARY OSTEOARTHRITIS, UNSPECIFIED SITE: ICD-10-CM

## 2020-10-28 DIAGNOSIS — J30.9 ALLERGIC RHINITIS, UNSPECIFIED SEASONALITY, UNSPECIFIED TRIGGER: ICD-10-CM

## 2020-10-28 DIAGNOSIS — Z01.419 WELL WOMAN EXAM WITH ROUTINE GYNECOLOGICAL EXAM: ICD-10-CM

## 2020-10-28 DIAGNOSIS — Z12.4 ROUTINE PAPANICOLAOU SMEAR: Primary | ICD-10-CM

## 2020-10-28 DIAGNOSIS — Z00.00 ROUTINE GENERAL MEDICAL EXAMINATION AT A HEALTH CARE FACILITY: Primary | ICD-10-CM

## 2020-10-28 PROBLEM — R10.2 PELVIC PAIN: Status: RESOLVED | Noted: 2018-09-24 | Resolved: 2020-10-28

## 2020-10-28 LAB
ESTIMATED AVG GLUCOSE: 123 MG/DL (ref 68–131)
HBA1C MFR BLD HPLC: 5.9 % (ref 4–5.6)

## 2020-10-28 PROCEDURE — 90686 IIV4 VACC NO PRSV 0.5 ML IM: CPT | Mod: S$GLB,,, | Performed by: INTERNAL MEDICINE

## 2020-10-28 PROCEDURE — 90471 IMMUNIZATION ADMIN: CPT | Mod: S$GLB,,, | Performed by: INTERNAL MEDICINE

## 2020-10-28 PROCEDURE — 88175 CYTOPATH C/V AUTO FLUID REDO: CPT

## 2020-10-28 PROCEDURE — 83036 HEMOGLOBIN GLYCOSYLATED A1C: CPT

## 2020-10-28 PROCEDURE — 99999 PR PBB SHADOW E&M-EST. PATIENT-LVL III: ICD-10-PCS | Mod: PBBFAC,,, | Performed by: OBSTETRICS & GYNECOLOGY

## 2020-10-28 PROCEDURE — 99999 PR PBB SHADOW E&M-EST. PATIENT-LVL IV: CPT | Mod: PBBFAC,,, | Performed by: INTERNAL MEDICINE

## 2020-10-28 PROCEDURE — 99396 PREV VISIT EST AGE 40-64: CPT | Mod: 25,S$GLB,, | Performed by: INTERNAL MEDICINE

## 2020-10-28 PROCEDURE — 36415 COLL VENOUS BLD VENIPUNCTURE: CPT | Mod: PO

## 2020-10-28 PROCEDURE — 99396 PREV VISIT EST AGE 40-64: CPT | Mod: S$GLB,,, | Performed by: OBSTETRICS & GYNECOLOGY

## 2020-10-28 PROCEDURE — 99999 PR PBB SHADOW E&M-EST. PATIENT-LVL III: CPT | Mod: PBBFAC,,, | Performed by: OBSTETRICS & GYNECOLOGY

## 2020-10-28 PROCEDURE — 90471 FLU VACCINE (QUAD) GREATER THAN OR EQUAL TO 3YO PRESERVATIVE FREE IM: ICD-10-PCS | Mod: S$GLB,,, | Performed by: INTERNAL MEDICINE

## 2020-10-28 PROCEDURE — 99396 PR PREVENTIVE VISIT,EST,40-64: ICD-10-PCS | Mod: 25,S$GLB,, | Performed by: INTERNAL MEDICINE

## 2020-10-28 PROCEDURE — 99999 PR PBB SHADOW E&M-EST. PATIENT-LVL IV: ICD-10-PCS | Mod: PBBFAC,,, | Performed by: INTERNAL MEDICINE

## 2020-10-28 PROCEDURE — 90686 FLU VACCINE (QUAD) GREATER THAN OR EQUAL TO 3YO PRESERVATIVE FREE IM: ICD-10-PCS | Mod: S$GLB,,, | Performed by: INTERNAL MEDICINE

## 2020-10-28 PROCEDURE — 99396 PR PREVENTIVE VISIT,EST,40-64: ICD-10-PCS | Mod: S$GLB,,, | Performed by: OBSTETRICS & GYNECOLOGY

## 2020-10-28 RX ORDER — FLUTICASONE PROPIONATE 50 MCG
1 SPRAY, SUSPENSION (ML) NASAL DAILY
Qty: 18 ML | Refills: 11 | Status: SHIPPED | OUTPATIENT
Start: 2020-10-28 | End: 2021-11-03 | Stop reason: SDUPTHER

## 2020-10-28 RX ORDER — FLUCONAZOLE 150 MG/1
150 TABLET ORAL ONCE
Qty: 1 TABLET | Refills: 4 | Status: SHIPPED | OUTPATIENT
Start: 2020-10-28 | End: 2020-10-28

## 2020-10-28 RX ORDER — DICLOFENAC SODIUM 75 MG/1
75 TABLET, DELAYED RELEASE ORAL 2 TIMES DAILY
Qty: 60 TABLET | Refills: 3 | Status: SHIPPED | OUTPATIENT
Start: 2020-10-28 | End: 2020-11-27

## 2020-10-28 RX ORDER — TERCONAZOLE 8 MG/G
1 CREAM VAGINAL NIGHTLY
Qty: 20 G | Refills: 3 | Status: SHIPPED | OUTPATIENT
Start: 2020-10-28 | End: 2020-10-31

## 2020-10-28 NOTE — PROGRESS NOTES
CC: Annual check-up    SUBJECTIVE:   60 y.o. female   for annual routine Pap and checkup. No LMP recorded (lmp unknown). Patient is postmenopausal..  She has no unusual complaints and has had 3 episodes of yeast infxns over past year, no vb, no fibroid problems, [place] [facility] [place]        Past Medical History:   Diagnosis Date    Chicken pox     Hepatitis A     fatty liver    Hyperlipidemia     Shingles      Past Surgical History:   Procedure Laterality Date    eximer laser      MYOMECTOMY       Social History     Socioeconomic History    Marital status:      Spouse name: Not on file    Number of children: Not on file    Years of education: Not on file    Highest education level: Not on file   Occupational History    Not on file   Social Needs    Financial resource strain: Not on file    Food insecurity     Worry: Not on file     Inability: Not on file    Transportation needs     Medical: No     Non-medical: No   Tobacco Use    Smoking status: Former Smoker    Smokeless tobacco: Former User   Substance and Sexual Activity    Alcohol use: No     Frequency: Never     Drinks per session: Patient refused     Binge frequency: Never    Drug use: No    Sexual activity: Never   Lifestyle    Physical activity     Days per week: 6 days     Minutes per session: 60 min    Stress: Not at all   Relationships    Social connections     Talks on phone: Patient refused     Gets together: Patient refused     Attends Synagogue service: Not on file     Active member of club or organization: Patient refused     Attends meetings of clubs or organizations: Patient refused     Relationship status:    Other Topics Concern    Not on file   Social History Narrative    Not on file     Family History   Problem Relation Age of Onset    Cancer Mother     Stomach cancer Mother      OB History    Para Term  AB Living   0 0 0 0 0 0   SAB TAB Ectopic Multiple Live Births   0  "0 0 0           Current Outpatient Medications   Medication Sig Dispense Refill    cholecalciferol, vitamin D3, 5,000 unit Tab Take 5,000 Units by mouth once daily.      estradiol (ESTRACE) 0.01 % (0.1 mg/gram) vaginal cream Place a pea-sized amount inside vagina every night for 1 week, and then 2-3 times a week. 42.5 g 6    fluconazole (DIFLUCAN) 150 MG Tab Take 1 tablet (150 mg total) by mouth once. REFILL AND RE-DOSE IF SYMPTOMS RECUR for 1 dose 1 tablet 4    terconazole (TERAZOL 3) 0.8 % vaginal cream Place 1 applicator vaginally every evening. for 3 days 20 g 3     No current facility-administered medications for this visit.      Allergies: Merthiolate (benzalkonium) [benzalkonium chloride]     ROS:  Constitutional: no weight loss, weight gain, fever, fatigue  Eyes:  No vision changes, glasses/contacts  ENT/Mouth: No ulcers, sinus problems, ears ringing, headache  Cardiovascular: No inability to lie flat, chest pain, exercise intolerance, swelling, heart palpitations  Respiratory: No wheezing, coughing blood, shortness of breath, or cough  Gastrointestinal: No diarrhea, bloody stool, nausea/vomiting, constipation, gas, hemorrhoids  Genitourinary: No blood in urine, painful urination, urgency of urination, frequency of urination, incomplete emptying, incontinence, abnormal bleeding, painful periods, heavy periods, vaginal discharge, vaginal odor, painful intercourse, sexual problems, bleeding after intercourse.  Musculoskeletal: No muscle weakness  Skin/Breast: No painful breasts, nipple discharge, masses, rash, ulcers  Neurological: No passing out, seizures, numbness, headache  Endocrine: No diabetes, hypothyroid, hyperthyroid, hot flashes, hair loss, abnormal hair growth, ance  Psychiatric: No depression, crying  Hematologic: No bruises, bleeding, swollen lymph nodes, anemia.      OBJECTIVE:   The patient appears well, alert, oriented x 3, in no distress.  /66   Ht 5' 2" (1.575 m)   Wt 59.3 kg (130 " lb 12.8 oz)   LMP  (LMP Unknown)   BMI 23.92 kg/m²   NECK: no thyromegaly, trachea midline  SKIN: no acne, striae, hirsutism  BREAST EXAM: breasts appear normal, no suspicious masses, no skin or nipple changes or axillary nodes  ABDOMEN: no hernias, masses, or hepatosplenomegaly  GENITALIA: normal external genitalia, no erythema, no discharge  URETHRA: normal urethra, normal urethral meatus  VAGINA: mucosal atrophy  CERVIX: no lesions or cervical motion tenderness  UTERUS: enlarged, 18 weeks size and retroflexed  ADNEXA: normal adnexa      ASSESSMENT:   well woman  no contraindication to continue hormonal therapy  1. Routine Papanicolaou smear    2. Well woman exam with routine gynecological exam        PLAN:   mammogram  pap smear  return annually or prn  Orders Placed This Encounter    Mammo Digital Screening Bilat w/ Alberto    Liquid-Based Pap Smear, Screening    fluconazole (DIFLUCAN) 150 MG Tab    terconazole (TERAZOL 3) 0.8 % vaginal cream

## 2020-10-28 NOTE — PROGRESS NOTES
Subjective:       Patient ID: Rosetta Toro is a 60 y.o. female.    Chief Complaint: Follow-up (Pt wants to check her lipid etc)    HPI  Wellness check.  Chart reviewed.  FBSs all < 120.  C/O pain in feet.  C/O pain over L maxilla with nasal congestion.  No dental pain, no F/C.  No CP, SOB.  Review of Systems   Constitutional: Negative for activity change and unexpected weight change.   HENT: Negative for hearing loss, rhinorrhea and trouble swallowing.    Eyes: Negative for discharge and visual disturbance.   Respiratory: Negative for chest tightness and wheezing.    Cardiovascular: Negative for chest pain and palpitations.   Gastrointestinal: Negative for blood in stool, constipation, diarrhea and vomiting.   Endocrine: Negative for polydipsia and polyuria.   Genitourinary: Negative for difficulty urinating, dysuria, hematuria and menstrual problem.   Musculoskeletal: Negative for arthralgias and joint swelling.   Neurological: Negative for weakness and headaches.   Psychiatric/Behavioral: Negative for confusion and dysphoric mood.   All other systems reviewed and are negative.      Objective:      Physical Exam  Vitals signs reviewed.   Constitutional:       General: She is not in acute distress.     Appearance: She is well-developed and normal weight.   HENT:      Head: Normocephalic.      Nose: Nose normal.      Comments: L maxillary sinus sl tender     Mouth/Throat:      Mouth: Mucous membranes are moist.      Pharynx: Oropharynx is clear.   Eyes:      General: No scleral icterus.     Extraocular Movements: Extraocular movements intact.      Conjunctiva/sclera: Conjunctivae normal.   Neck:      Musculoskeletal: Normal range of motion.   Cardiovascular:      Rate and Rhythm: Normal rate and regular rhythm.      Pulses: Normal pulses.      Heart sounds: Normal heart sounds.   Pulmonary:      Effort: Pulmonary effort is normal.      Breath sounds: Normal breath sounds.   Abdominal:      General: Abdomen is  flat. Bowel sounds are normal. There is no distension.      Palpations: Abdomen is soft. There is no mass.   Musculoskeletal: Normal range of motion.      Right lower leg: No edema.      Left lower leg: No edema.   Lymphadenopathy:      Cervical: No cervical adenopathy.   Skin:     General: Skin is warm and dry.   Neurological:      General: No focal deficit present.      Mental Status: She is alert.      Cranial Nerves: No cranial nerve deficit.      Motor: No abnormal muscle tone.      Coordination: Coordination normal.   Psychiatric:         Mood and Affect: Mood normal.         Behavior: Behavior normal.         Assessment:       1. Routine general medical examination at a health care facility    2. Primary osteoarthritis, unspecified site    3. Prediabetes    4. Allergic rhinitis, unspecified seasonality, unspecified trigger        Plan:       Rosetta was seen today for follow-up.    Diagnoses and all orders for this visit:    Routine general medical examination at a health care facility    Primary osteoarthritis, unspecified site  -     diclofenac (VOLTAREN) 75 MG EC tablet; Take 1 tablet (75 mg total) by mouth 2 (two) times daily.    Prediabetes  -     Influenza - Quadrivalent (PF)  -     Hemoglobin A1C; Future    Allergic rhinitis, unspecified seasonality, unspecified trigger  -     fluticasone propionate (FLONASE) 50 mcg/actuation nasal spray; 1 spray (50 mcg total) by Each Nostril route once daily.      Follow up if symptoms worsen or fail to improve.

## 2020-11-04 ENCOUNTER — PATIENT OUTREACH (OUTPATIENT)
Dept: ADMINISTRATIVE | Facility: OTHER | Age: 60
End: 2020-11-04

## 2020-11-04 NOTE — PROGRESS NOTES
Chart was reviewed for overdue Proactive [facility] Encounters ([facility]ORGAN)  topics  Updates were unable to be requested from care everywhere  Health Maintenance has been updated  [place] immunization registry triggered

## 2020-11-05 ENCOUNTER — OFFICE VISIT (OUTPATIENT)
Dept: PODIATRY | Facility: CLINIC | Age: 60
End: 2020-11-05
Payer: COMMERCIAL

## 2020-11-05 VITALS
WEIGHT: 129 LBS | HEIGHT: 62 IN | BODY MASS INDEX: 23.74 KG/M2 | DIASTOLIC BLOOD PRESSURE: 65 MMHG | SYSTOLIC BLOOD PRESSURE: 143 MMHG | HEART RATE: 93 BPM | TEMPERATURE: 98 F

## 2020-11-05 DIAGNOSIS — M20.11 HALLUX VALGUS OF RIGHT FOOT: ICD-10-CM

## 2020-11-05 DIAGNOSIS — D36.10 NEUROMA: Primary | ICD-10-CM

## 2020-11-05 DIAGNOSIS — M72.2 PLANTAR FASCIITIS: ICD-10-CM

## 2020-11-05 PROCEDURE — 99213 PR OFFICE/OUTPT VISIT, EST, LEVL III, 20-29 MIN: ICD-10-PCS | Mod: S$GLB,,, | Performed by: PODIATRIST

## 2020-11-05 PROCEDURE — 99213 OFFICE O/P EST LOW 20 MIN: CPT | Mod: S$GLB,,, | Performed by: PODIATRIST

## 2020-11-05 PROCEDURE — 99999 PR PBB SHADOW E&M-EST. PATIENT-LVL III: CPT | Mod: PBBFAC,,, | Performed by: PODIATRIST

## 2020-11-05 PROCEDURE — 99999 PR PBB SHADOW E&M-EST. PATIENT-LVL III: ICD-10-PCS | Mod: PBBFAC,,, | Performed by: PODIATRIST

## 2020-11-05 RX ORDER — FLUCONAZOLE 150 MG/1
TABLET ORAL
COMMUNITY
Start: 2020-10-28 | End: 2022-10-25

## 2020-11-05 NOTE — LETTER
November 8, 2020      Jose Guadalupe Ramey MD  [address]  [address]  [place] [address]           [facility] - [facility]ry[facility]  [address] [facility] [address]  Phone: [phone]  Fax: [phone]          Patient: Rosetta Toro   MR Number: [id number]   YOB: 1960   Date of Visit: 11/5/2020       Dear Dr. Jose Guadalupe Ramey:    Thank you for referring Rosetta Toro to me for evaluation. Attached you will find relevant portions of my assessment and plan of care.    If you have questions, please do not hesitate to call me. I look forward to following Rosetta Toro along with you.    Sincerely,    Jl Banegas, YASMINE    Enclosure  CC:  No Recipients    If you would like to receive this communication electronically, please contact [email or web address] or [phone] to request more information on [facility] access.    For providers and/or their staff who would like to refer a patient to [facility], please contact us through our one-stop-shop provider referral line, [facility], at [phone].    If you feel you have received this communication in error or would no longer like to receive these types of communications, please e-mail [email or web address]

## 2020-11-08 PROBLEM — M72.2 PLANTAR FASCIITIS: Status: ACTIVE | Noted: 2020-11-08

## 2020-11-08 NOTE — PROGRESS NOTES
Subjective:       Patient ID: Rosetta Toro is a 60 y.o. female.    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  Patient states the burning in the numbness that she experiences in her right foot also involves the bunion but it involves the area between the toes additionally.  Patient relates no injury or trauma other than her running for exercise.  Patient presents for follow-up evaluation due to neuroma.    Past Medical History:   Diagnosis Date    Chicken pox     Hepatitis A     fatty liver    Hyperlipidemia     Osteoarthritis     Shingles      Past Surgical History:   Procedure Laterality Date    eximer laser      MYOMECTOMY       Family History   Problem Relation Age of Onset    Cancer Mother     Stomach cancer Mother      Social History     Socioeconomic History    Marital status:      Spouse name: Not on file    Number of children: Not on file    Years of education: Not on file    Highest education level: Not on file   Occupational History    Not on file   Social Needs    Financial resource strain: Not on file    Food insecurity     Worry: Not on file     Inability: Not on file    Transportation needs     Medical: No     Non-medical: No   Tobacco Use    Smoking status: Former Smoker    Smokeless tobacco: Former User   Substance and Sexual Activity    Alcohol use: No     Frequency: Never     Drinks per session: Patient refused     Binge frequency: Never    Drug use: No    Sexual activity: Never   Lifestyle    Physical activity     Days per week: 6 days     Minutes per session: 60 min    Stress: Not at all   Relationships    Social connections     Talks on phone: Patient refused     Gets together: Patient refused     Attends Roman Catholic service: Not  "on file     Active member of club or organization: Patient refused     Attends meetings of clubs or organizations: Patient refused     Relationship status:    Other Topics Concern    Not on file   Social History Narrative    Not on file       Current Outpatient Medications   Medication Sig Dispense Refill    cholecalciferol, vitamin D3, 5,000 unit Tab Take 5,000 Units by mouth once daily.      diclofenac (VOLTAREN) 75 MG EC tablet Take 1 tablet (75 mg total) by mouth 2 (two) times daily. 60 tablet 3    estradiol (ESTRACE) 0.01 % (0.1 mg/gram) vaginal cream Place a pea-sized amount inside vagina every night for 1 week, and then 2-3 times a week. 42.5 g 6    fluconazole (DIFLUCAN) 150 MG Tab TAKE 1 TABLET BY MOUTH ONCE. REFILL AND RE DOSE IF SYMPTOMS RECUR FOR 1 DOSE      fluticasone propionate (FLONASE) 50 mcg/actuation nasal spray 1 spray (50 mcg total) by Each Nostril route once daily. 18 mL 11     No current facility-administered medications for this visit.      Review of patient's allergies indicates:   Allergen Reactions    Merthiolate (benzalkonium) [benzalkonium chloride]        Review of Systems   Musculoskeletal: Positive for arthralgias and joint swelling.   All other systems reviewed and are negative.      Objective:      Vitals:    11/05/20 1121   BP: (!) 143/65   Pulse: 93   Temp: 98.4 °F (36.9 °C)   Weight: 58.5 kg (129 lb)   Height: 5' 2" (1.575 m)     Physical Exam  Vitals signs and nursing note reviewed.   Constitutional:       Appearance: Normal appearance.   Cardiovascular:      Pulses: Normal pulses.           Dorsalis pedis pulses are 2+ on the right side and 2+ on the left side.        Posterior tibial pulses are 2+ on the right side and 2+ on the left side.   Musculoskeletal: Normal range of motion.         General: Tenderness present.      Right foot: Bunion present.   Feet:      Right foot:      Skin integrity: Erythema and warmth present.   Skin:     General: Skin is warm.     "  Capillary Refill: Capillary refill takes less than 2 seconds.   Neurological:      General: No focal deficit present.      Mental Status: She is alert.   Psychiatric:         Mood and Affect: Mood normal.         Behavior: Behavior normal.         Thought Content: Thought content normal.         Judgment: Judgment normal.              Assessment:       1. Neuroma    2. Hallux valgus of right foot    3. Plantar fasciitis        Plan:       Patient presents today for follow-up bilateral foot pain she states her right foot is doing great she is not having any pain or discomfort from previously noted neuroma and bunion of the right foot she is however started to have a little bit of left heel pain since I last saw her she states she is very pleased with her improvement she has been able to run and not had very much discomfort at all.  I had previously dispensed the patient diabetic insole she states these were great however they are limited to only 2 pair of running shoes that she uses that she is able to use them in the other shoes the diabetic insoles are too tight.  Following evaluation I did dispense the patient another pair of diabetic insoles for her to utilize in the shoes that they work in the other running shoes the patient has I have recommended power step full length arch supports these are much thinner they take up a lot less space the diabetic insole and will give the patient adequate support I have advised her we can always add additional support to these as necessary but she needs to make sure she is using the proper arch support at all times of weight-bearing.  Patient had also inquired about what she can use for dress type shoe I have advised her the power step arch supports make a slim tech support that I would recommend the patient was in agreement with this overall patient states she is doing great she is very happy she does not have to stop running and she is able to continue to exercise as she  wants obviously the bunion problem may become an issue down the road but as of right now it is not causing her significant enough discomfort to warrant surgical intervention.  Plan follow-up with the patient will be as needed I have advised the patient if she starts having any continued pain or the heel pain starts to get worse we can always add additional support to her power steps.  This note was created using [employer] voice recognition software that occasionally misinterpreted phrases or words.

## 2020-12-02 ENCOUNTER — PATIENT MESSAGE (OUTPATIENT)
Dept: ADMINISTRATIVE | Facility: HOSPITAL | Age: 60
End: 2020-12-02

## 2020-12-04 LAB
FINAL PATHOLOGIC DIAGNOSIS: NORMAL
Lab: NORMAL

## 2021-01-04 ENCOUNTER — PATIENT MESSAGE (OUTPATIENT)
Dept: ADMINISTRATIVE | Facility: HOSPITAL | Age: 61
End: 2021-01-04

## 2021-01-28 ENCOUNTER — PATIENT OUTREACH (OUTPATIENT)
Dept: ADMINISTRATIVE | Facility: HOSPITAL | Age: 61
End: 2021-01-28

## 2021-01-28 ENCOUNTER — PATIENT MESSAGE (OUTPATIENT)
Dept: ADMINISTRATIVE | Facility: HOSPITAL | Age: 61
End: 2021-01-28

## 2021-03-20 ENCOUNTER — HOSPITAL ENCOUNTER (OUTPATIENT)
Dept: RADIOLOGY | Facility: HOSPITAL | Age: 61
Discharge: HOME | End: 2021-03-20
Attending: OBSTETRICS & GYNECOLOGY
Payer: COMMERCIAL

## 2021-03-20 VITALS — BODY MASS INDEX: 23.17 KG/M2 | HEIGHT: 62 IN | WEIGHT: 125.88 LBS

## 2021-03-20 DIAGNOSIS — Z01.419 WELL WOMAN EXAM WITH ROUTINE GYNECOLOGICAL EXAM: ICD-10-CM

## 2021-03-20 PROCEDURE — 77063 BREAST TOMOSYNTHESIS BI: CPT | Mod: 26,,, | Performed by: RADIOLOGY

## 2021-03-20 PROCEDURE — 77063 MAMMO DIGITAL SCREENING BILAT WITH TOMO: ICD-10-PCS | Mod: 26,,, | Performed by: RADIOLOGY

## 2021-03-20 PROCEDURE — 77067 SCR MAMMO BI INCL CAD: CPT | Mod: 26,,, | Performed by: RADIOLOGY

## 2021-03-20 PROCEDURE — 77067 MAMMO DIGITAL SCREENING BILAT WITH TOMO: ICD-10-PCS | Mod: 26,,, | Performed by: RADIOLOGY

## 2021-03-20 PROCEDURE — 77067 SCR MAMMO BI INCL CAD: CPT | Mod: TC

## 2021-04-05 ENCOUNTER — PATIENT MESSAGE (OUTPATIENT)
Dept: ADMINISTRATIVE | Facility: HOSPITAL | Age: 61
End: 2021-04-05

## 2021-07-07 ENCOUNTER — PATIENT MESSAGE (OUTPATIENT)
Dept: ADMINISTRATIVE | Facility: HOSPITAL | Age: 61
End: 2021-07-07

## 2021-07-14 ENCOUNTER — PATIENT OUTREACH (OUTPATIENT)
Dept: ADMINISTRATIVE | Facility: HOSPITAL | Age: 61
End: 2021-07-14

## 2021-07-14 ENCOUNTER — PATIENT MESSAGE (OUTPATIENT)
Dept: ADMINISTRATIVE | Facility: HOSPITAL | Age: 61
End: 2021-07-14

## 2021-07-22 ENCOUNTER — PATIENT OUTREACH (OUTPATIENT)
Dept: ADMINISTRATIVE | Facility: HOSPITAL | Age: 61
End: 2021-07-22

## 2021-07-22 DIAGNOSIS — Z12.11 COLON CANCER SCREENING: ICD-10-CM

## 2021-07-28 ENCOUNTER — OFFICE VISIT (OUTPATIENT)
Dept: FAMILY MEDICINE | Facility: CLINIC | Age: 61
End: 2021-07-28
Payer: COMMERCIAL

## 2021-07-28 VITALS
WEIGHT: 129 LBS | DIASTOLIC BLOOD PRESSURE: 71 MMHG | BODY MASS INDEX: 23.74 KG/M2 | HEIGHT: 62 IN | HEART RATE: 86 BPM | SYSTOLIC BLOOD PRESSURE: 133 MMHG | RESPIRATION RATE: 20 BRPM | OXYGEN SATURATION: 98 %

## 2021-07-28 DIAGNOSIS — R73.03 PREDIABETES: Primary | ICD-10-CM

## 2021-07-28 DIAGNOSIS — Z20.822 ENCOUNTER FOR LABORATORY TESTING FOR COVID-19 VIRUS: ICD-10-CM

## 2021-07-28 DIAGNOSIS — Z12.11 COLON CANCER SCREENING: ICD-10-CM

## 2021-07-28 PROCEDURE — 99204 OFFICE O/P NEW MOD 45 MIN: CPT | Mod: S$GLB,,, | Performed by: FAMILY MEDICINE

## 2021-07-28 PROCEDURE — 99204 PR OFFICE/OUTPT VISIT, NEW, LEVL IV, 45-59 MIN: ICD-10-PCS | Mod: S$GLB,,, | Performed by: FAMILY MEDICINE

## 2021-07-28 PROCEDURE — U0003 INFECTIOUS AGENT DETECTION BY NUCLEIC ACID (DNA OR RNA); SEVERE ACUTE RESPIRATORY SYNDROME CORONAVIRUS 2 (SARS-COV-2) (CORONAVIRUS DISEASE [COVID-19]), AMPLIFIED PROBE TECHNIQUE, MAKING USE OF HIGH THROUGHPUT TECHNOLOGIES AS DESCRIBED BY CMS-2020-01-R: HCPCS | Performed by: FAMILY MEDICINE

## 2021-07-28 PROCEDURE — U0005 INFEC AGEN DETEC AMPLI PROBE: HCPCS | Performed by: FAMILY MEDICINE

## 2021-07-28 PROCEDURE — 99999 PR PBB SHADOW E&M-EST. PATIENT-LVL III: ICD-10-PCS | Mod: PBBFAC,,, | Performed by: FAMILY MEDICINE

## 2021-07-28 PROCEDURE — 99999 PR PBB SHADOW E&M-EST. PATIENT-LVL III: CPT | Mod: PBBFAC,,, | Performed by: FAMILY MEDICINE

## 2021-07-28 RX ORDER — TRIAMCINOLONE ACETONIDE 1 MG/G
OINTMENT TOPICAL 2 TIMES DAILY
Qty: 80 G | Refills: 2 | Status: SHIPPED | OUTPATIENT
Start: 2021-07-28 | End: 2023-02-02

## 2021-07-29 LAB
SARS-COV-2 RNA RESP QL NAA+PROBE: NOT DETECTED
SARS-COV-2- CYCLE NUMBER: -1

## 2021-10-21 ENCOUNTER — LAB VISIT (OUTPATIENT)
Dept: LAB | Facility: HOSPITAL | Age: 61
End: 2021-10-21
Attending: FAMILY MEDICINE
Payer: COMMERCIAL

## 2021-10-21 DIAGNOSIS — R73.03 PREDIABETES: ICD-10-CM

## 2021-10-21 LAB
25(OH)D3+25(OH)D2 SERPL-MCNC: 33 NG/ML (ref 30–96)
ALBUMIN SERPL BCP-MCNC: 4 G/DL (ref 3.5–5.2)
ALP SERPL-CCNC: 54 U/L (ref 55–135)
ALT SERPL W/O P-5'-P-CCNC: 31 U/L (ref 10–44)
ANION GAP SERPL CALC-SCNC: 10 MMOL/L (ref 8–16)
AST SERPL-CCNC: 32 U/L (ref 10–40)
BASOPHILS # BLD AUTO: 0.05 K/UL (ref 0–0.2)
BASOPHILS NFR BLD: 1 % (ref 0–1.9)
BILIRUB SERPL-MCNC: 0.6 MG/DL (ref 0.1–1)
BUN SERPL-MCNC: 19 MG/DL (ref 8–23)
CALCIUM SERPL-MCNC: 9.5 MG/DL (ref 8.7–10.5)
CHLORIDE SERPL-SCNC: 105 MMOL/L (ref 95–110)
CHOLEST SERPL-MCNC: 289 MG/DL (ref 120–199)
CHOLEST/HDLC SERPL: 3.5 {RATIO} (ref 2–5)
CO2 SERPL-SCNC: 24 MMOL/L (ref 23–29)
CREAT SERPL-MCNC: 0.8 MG/DL (ref 0.5–1.4)
DIFFERENTIAL METHOD: NORMAL
EOSINOPHIL # BLD AUTO: 0.1 K/UL (ref 0–0.5)
EOSINOPHIL NFR BLD: 2.5 % (ref 0–8)
ERYTHROCYTE [DISTWIDTH] IN BLOOD BY AUTOMATED COUNT: 13.8 % (ref 11.5–14.5)
EST. GFR  (AFRICAN AMERICAN): >60 ML/MIN/1.73 M^2
EST. GFR  (NON AFRICAN AMERICAN): >60 ML/MIN/1.73 M^2
GLUCOSE SERPL-MCNC: 110 MG/DL (ref 70–110)
HCT VFR BLD AUTO: 39.6 % (ref 37–48.5)
HDLC SERPL-MCNC: 83 MG/DL (ref 40–75)
HDLC SERPL: 28.7 % (ref 20–50)
HGB BLD-MCNC: 13.6 G/DL (ref 12–16)
IMM GRANULOCYTES # BLD AUTO: 0.01 K/UL (ref 0–0.04)
IMM GRANULOCYTES NFR BLD AUTO: 0.2 % (ref 0–0.5)
LDLC SERPL CALC-MCNC: 186.4 MG/DL (ref 63–159)
LYMPHOCYTES # BLD AUTO: 1.3 K/UL (ref 1–4.8)
LYMPHOCYTES NFR BLD: 24.7 % (ref 18–48)
MCH RBC QN AUTO: 29.7 PG (ref 27–31)
MCHC RBC AUTO-ENTMCNC: 34.3 G/DL (ref 32–36)
MCV RBC AUTO: 87 FL (ref 82–98)
MONOCYTES # BLD AUTO: 0.5 K/UL (ref 0.3–1)
MONOCYTES NFR BLD: 9.8 % (ref 4–15)
NEUTROPHILS # BLD AUTO: 3.2 K/UL (ref 1.8–7.7)
NEUTROPHILS NFR BLD: 61.8 % (ref 38–73)
NONHDLC SERPL-MCNC: 206 MG/DL
NRBC BLD-RTO: 0 /100 WBC
PLATELET # BLD AUTO: 252 K/UL (ref 150–450)
PMV BLD AUTO: 11.3 FL (ref 9.2–12.9)
POTASSIUM SERPL-SCNC: 3.7 MMOL/L (ref 3.5–5.1)
PROT SERPL-MCNC: 7.4 G/DL (ref 6–8.4)
RBC # BLD AUTO: 4.58 M/UL (ref 4–5.4)
SODIUM SERPL-SCNC: 139 MMOL/L (ref 136–145)
T4 FREE SERPL-MCNC: 0.99 NG/DL (ref 0.71–1.51)
TRIGL SERPL-MCNC: 98 MG/DL (ref 30–150)
TSH SERPL DL<=0.005 MIU/L-ACNC: 2.07 UIU/ML (ref 0.4–4)
WBC # BLD AUTO: 5.18 K/UL (ref 3.9–12.7)

## 2021-10-21 PROCEDURE — 85025 COMPLETE CBC W/AUTO DIFF WBC: CPT | Performed by: FAMILY MEDICINE

## 2021-10-21 PROCEDURE — 84443 ASSAY THYROID STIM HORMONE: CPT | Performed by: FAMILY MEDICINE

## 2021-10-21 PROCEDURE — 36415 COLL VENOUS BLD VENIPUNCTURE: CPT | Performed by: FAMILY MEDICINE

## 2021-10-21 PROCEDURE — 82306 VITAMIN D 25 HYDROXY: CPT | Performed by: FAMILY MEDICINE

## 2021-10-21 PROCEDURE — 80053 COMPREHEN METABOLIC PANEL: CPT | Performed by: FAMILY MEDICINE

## 2021-10-21 PROCEDURE — 80061 LIPID PANEL: CPT | Performed by: FAMILY MEDICINE

## 2021-10-21 PROCEDURE — 84439 ASSAY OF FREE THYROXINE: CPT | Performed by: FAMILY MEDICINE

## 2021-11-03 ENCOUNTER — OFFICE VISIT (OUTPATIENT)
Dept: FAMILY MEDICINE | Facility: CLINIC | Age: 61
End: 2021-11-03
Payer: COMMERCIAL

## 2021-11-03 VITALS
OXYGEN SATURATION: 97 % | HEIGHT: 62 IN | RESPIRATION RATE: 15 BRPM | SYSTOLIC BLOOD PRESSURE: 112 MMHG | DIASTOLIC BLOOD PRESSURE: 62 MMHG | HEART RATE: 73 BPM | BODY MASS INDEX: 24.23 KG/M2 | WEIGHT: 131.69 LBS

## 2021-11-03 DIAGNOSIS — E78.49 OTHER HYPERLIPIDEMIA: Primary | ICD-10-CM

## 2021-11-03 DIAGNOSIS — Z12.11 COLON CANCER SCREENING: ICD-10-CM

## 2021-11-03 DIAGNOSIS — J30.9 ALLERGIC RHINITIS, UNSPECIFIED SEASONALITY, UNSPECIFIED TRIGGER: ICD-10-CM

## 2021-11-03 DIAGNOSIS — Z23 NEED FOR VACCINATION: ICD-10-CM

## 2021-11-03 PROCEDURE — 99214 OFFICE O/P EST MOD 30 MIN: CPT | Mod: 25,S$GLB,, | Performed by: FAMILY MEDICINE

## 2021-11-03 PROCEDURE — 99999 PR PBB SHADOW E&M-EST. PATIENT-LVL III: CPT | Mod: PBBFAC,,, | Performed by: FAMILY MEDICINE

## 2021-11-03 PROCEDURE — 90471 IMMUNIZATION ADMIN: CPT | Mod: S$GLB,,, | Performed by: FAMILY MEDICINE

## 2021-11-03 PROCEDURE — 99214 PR OFFICE/OUTPT VISIT, EST, LEVL IV, 30-39 MIN: ICD-10-PCS | Mod: 25,S$GLB,, | Performed by: FAMILY MEDICINE

## 2021-11-03 PROCEDURE — 90686 IIV4 VACC NO PRSV 0.5 ML IM: CPT | Mod: S$GLB,,, | Performed by: FAMILY MEDICINE

## 2021-11-03 PROCEDURE — 90686 FLU VACCINE (QUAD) GREATER THAN OR EQUAL TO 3YO PRESERVATIVE FREE IM: ICD-10-PCS | Mod: S$GLB,,, | Performed by: FAMILY MEDICINE

## 2021-11-03 PROCEDURE — 90471 FLU VACCINE (QUAD) GREATER THAN OR EQUAL TO 3YO PRESERVATIVE FREE IM: ICD-10-PCS | Mod: S$GLB,,, | Performed by: FAMILY MEDICINE

## 2021-11-03 PROCEDURE — 99999 PR PBB SHADOW E&M-EST. PATIENT-LVL III: ICD-10-PCS | Mod: PBBFAC,,, | Performed by: FAMILY MEDICINE

## 2021-11-03 RX ORDER — ESTRADIOL 0.1 MG/G
CREAM VAGINAL
Qty: 42.5 G | Refills: 6 | Status: SHIPPED | OUTPATIENT
Start: 2021-11-03 | End: 2022-11-23 | Stop reason: SDUPTHER

## 2021-11-03 RX ORDER — BENZONATATE 100 MG/1
100 CAPSULE ORAL 3 TIMES DAILY PRN
Qty: 30 CAPSULE | Refills: 2 | Status: SHIPPED | OUTPATIENT
Start: 2021-11-03 | End: 2022-10-25

## 2021-11-03 RX ORDER — FLUTICASONE PROPIONATE 50 MCG
1 SPRAY, SUSPENSION (ML) NASAL DAILY
Qty: 18 ML | Refills: 11 | Status: SHIPPED | OUTPATIENT
Start: 2021-11-03 | End: 2022-10-25 | Stop reason: SDUPTHER

## 2021-11-04 ENCOUNTER — PATIENT OUTREACH (OUTPATIENT)
Dept: ADMINISTRATIVE | Facility: OTHER | Age: 61
End: 2021-11-04
Payer: COMMERCIAL

## 2021-11-08 ENCOUNTER — OFFICE VISIT (OUTPATIENT)
Dept: OBSTETRICS AND GYNECOLOGY | Facility: CLINIC | Age: 61
End: 2021-11-08
Payer: COMMERCIAL

## 2021-11-08 VITALS
BODY MASS INDEX: 23.74 KG/M2 | DIASTOLIC BLOOD PRESSURE: 66 MMHG | SYSTOLIC BLOOD PRESSURE: 108 MMHG | HEIGHT: 62 IN | WEIGHT: 129 LBS

## 2021-11-08 DIAGNOSIS — N95.2 ATROPHIC VAGINITIS: Primary | ICD-10-CM

## 2021-11-08 DIAGNOSIS — Z01.419 WELL WOMAN EXAM WITH ROUTINE GYNECOLOGICAL EXAM: ICD-10-CM

## 2021-11-08 PROCEDURE — 99999 PR PBB SHADOW E&M-EST. PATIENT-LVL III: ICD-10-PCS | Mod: PBBFAC,,, | Performed by: OBSTETRICS & GYNECOLOGY

## 2021-11-08 PROCEDURE — 99396 PR PREVENTIVE VISIT,EST,40-64: ICD-10-PCS | Mod: S$GLB,,, | Performed by: OBSTETRICS & GYNECOLOGY

## 2021-11-08 PROCEDURE — 99396 PREV VISIT EST AGE 40-64: CPT | Mod: S$GLB,,, | Performed by: OBSTETRICS & GYNECOLOGY

## 2021-11-08 PROCEDURE — 88175 CYTOPATH C/V AUTO FLUID REDO: CPT | Performed by: OBSTETRICS & GYNECOLOGY

## 2021-11-08 PROCEDURE — 99999 PR PBB SHADOW E&M-EST. PATIENT-LVL III: CPT | Mod: PBBFAC,,, | Performed by: OBSTETRICS & GYNECOLOGY

## 2021-11-15 LAB
FINAL PATHOLOGIC DIAGNOSIS: NORMAL
Lab: NORMAL

## 2021-11-25 LAB — NONINV COLON CA DNA+OCC BLD SCRN STL QL: NEGATIVE

## 2022-03-08 ENCOUNTER — TELEPHONE (OUTPATIENT)
Dept: OBSTETRICS AND GYNECOLOGY | Facility: CLINIC | Age: 62
End: 2022-03-08
Payer: COMMERCIAL

## 2022-03-08 DIAGNOSIS — Z12.31 VISIT FOR SCREENING MAMMOGRAM: Primary | ICD-10-CM

## 2022-03-08 NOTE — TELEPHONE ENCOUNTER
----- Message from Mee Bone sent at 3/8/2022  9:18 AM CST -----  Type:  Mammogram    Caller is requesting to schedule their annual mammogram appointment.  Order is not listed in EPIC.  Please enter order and contact patient to schedule.  Name of Caller pt  Jai   Where would they like the mammogram performed? [facility]   Would the patient rather a call back or a response via [facility]?  Call   Best Call Back Number: [phone]  Additional Information:

## 2022-03-25 ENCOUNTER — HOSPITAL ENCOUNTER (OUTPATIENT)
Dept: RADIOLOGY | Facility: HOSPITAL | Age: 62
Discharge: HOME | End: 2022-03-25
Attending: OBSTETRICS & GYNECOLOGY
Payer: COMMERCIAL

## 2022-03-25 VITALS — BODY MASS INDEX: 23.74 KG/M2 | WEIGHT: 129 LBS | HEIGHT: 62 IN

## 2022-03-25 DIAGNOSIS — Z12.31 VISIT FOR SCREENING MAMMOGRAM: ICD-10-CM

## 2022-03-25 PROCEDURE — 77063 BREAST TOMOSYNTHESIS BI: CPT | Mod: 26,,, | Performed by: RADIOLOGY

## 2022-03-25 PROCEDURE — 77067 MAMMO DIGITAL SCREENING BILAT WITH TOMO: ICD-10-PCS | Mod: 26,,, | Performed by: RADIOLOGY

## 2022-03-25 PROCEDURE — 77067 SCR MAMMO BI INCL CAD: CPT | Mod: TC

## 2022-03-25 PROCEDURE — 77067 SCR MAMMO BI INCL CAD: CPT | Mod: 26,,, | Performed by: RADIOLOGY

## 2022-03-25 PROCEDURE — 77063 MAMMO DIGITAL SCREENING BILAT WITH TOMO: ICD-10-PCS | Mod: 26,,, | Performed by: RADIOLOGY

## 2022-03-25 PROCEDURE — 77063 BREAST TOMOSYNTHESIS BI: CPT | Mod: TC

## 2022-10-25 ENCOUNTER — TELEPHONE (OUTPATIENT)
Dept: FAMILY MEDICINE | Facility: CLINIC | Age: 62
End: 2022-10-25

## 2022-10-25 ENCOUNTER — OFFICE VISIT (OUTPATIENT)
Dept: FAMILY MEDICINE | Facility: CLINIC | Age: 62
End: 2022-10-25
Payer: COMMERCIAL

## 2022-10-25 ENCOUNTER — LAB VISIT (OUTPATIENT)
Dept: LAB | Facility: HOSPITAL | Age: 62
End: 2022-10-25
Payer: COMMERCIAL

## 2022-10-25 VITALS
OXYGEN SATURATION: 99 % | BODY MASS INDEX: 23.96 KG/M2 | RESPIRATION RATE: 18 BRPM | WEIGHT: 130.19 LBS | HEIGHT: 62 IN | SYSTOLIC BLOOD PRESSURE: 118 MMHG | DIASTOLIC BLOOD PRESSURE: 70 MMHG | HEART RATE: 78 BPM

## 2022-10-25 DIAGNOSIS — L40.9 PSORIASIS: ICD-10-CM

## 2022-10-25 DIAGNOSIS — J30.9 ALLERGIC RHINITIS, UNSPECIFIED SEASONALITY, UNSPECIFIED TRIGGER: ICD-10-CM

## 2022-10-25 DIAGNOSIS — Z00.00 WELL ADULT EXAM: ICD-10-CM

## 2022-10-25 DIAGNOSIS — E78.49 OTHER HYPERLIPIDEMIA: Primary | ICD-10-CM

## 2022-10-25 DIAGNOSIS — E78.49 OTHER HYPERLIPIDEMIA: ICD-10-CM

## 2022-10-25 DIAGNOSIS — M25.512 LEFT SHOULDER PAIN, UNSPECIFIED CHRONICITY: ICD-10-CM

## 2022-10-25 DIAGNOSIS — R73.03 PREDIABETES: ICD-10-CM

## 2022-10-25 LAB
25(OH)D3+25(OH)D2 SERPL-MCNC: 28 NG/ML (ref 30–96)
ALBUMIN SERPL BCP-MCNC: 3.9 G/DL (ref 3.5–5.2)
ALP SERPL-CCNC: 58 U/L (ref 55–135)
ALT SERPL W/O P-5'-P-CCNC: 30 U/L (ref 10–44)
ANION GAP SERPL CALC-SCNC: 11 MMOL/L (ref 8–16)
AST SERPL-CCNC: 28 U/L (ref 10–40)
BASOPHILS # BLD AUTO: 0.05 K/UL (ref 0–0.2)
BASOPHILS NFR BLD: 1 % (ref 0–1.9)
BILIRUB SERPL-MCNC: 0.5 MG/DL (ref 0.1–1)
BUN SERPL-MCNC: 13 MG/DL (ref 8–23)
CALCIUM SERPL-MCNC: 9.4 MG/DL (ref 8.7–10.5)
CHLORIDE SERPL-SCNC: 102 MMOL/L (ref 95–110)
CHOLEST SERPL-MCNC: 298 MG/DL (ref 120–199)
CHOLEST/HDLC SERPL: 3.9 {RATIO} (ref 2–5)
CO2 SERPL-SCNC: 27 MMOL/L (ref 23–29)
CREAT SERPL-MCNC: 0.8 MG/DL (ref 0.5–1.4)
DIFFERENTIAL METHOD: NORMAL
EOSINOPHIL # BLD AUTO: 0.1 K/UL (ref 0–0.5)
EOSINOPHIL NFR BLD: 2.5 % (ref 0–8)
ERYTHROCYTE [DISTWIDTH] IN BLOOD BY AUTOMATED COUNT: 13.9 % (ref 11.5–14.5)
EST. GFR  (NO RACE VARIABLE): >60 ML/MIN/1.73 M^2
ESTIMATED AVG GLUCOSE: 128 MG/DL (ref 68–131)
GLUCOSE SERPL-MCNC: 102 MG/DL (ref 70–110)
HBA1C MFR BLD: 6.1 % (ref 4–5.6)
HCT VFR BLD AUTO: 40.8 % (ref 37–48.5)
HDLC SERPL-MCNC: 77 MG/DL (ref 40–75)
HDLC SERPL: 25.8 % (ref 20–50)
HGB BLD-MCNC: 13.6 G/DL (ref 12–16)
IMM GRANULOCYTES # BLD AUTO: 0.02 K/UL (ref 0–0.04)
IMM GRANULOCYTES NFR BLD AUTO: 0.4 % (ref 0–0.5)
LDLC SERPL CALC-MCNC: 197.2 MG/DL (ref 63–159)
LYMPHOCYTES # BLD AUTO: 1.2 K/UL (ref 1–4.8)
LYMPHOCYTES NFR BLD: 22.9 % (ref 18–48)
MCH RBC QN AUTO: 28.9 PG (ref 27–31)
MCHC RBC AUTO-ENTMCNC: 33.3 G/DL (ref 32–36)
MCV RBC AUTO: 87 FL (ref 82–98)
MONOCYTES # BLD AUTO: 0.5 K/UL (ref 0.3–1)
MONOCYTES NFR BLD: 9.3 % (ref 4–15)
NEUTROPHILS # BLD AUTO: 3.3 K/UL (ref 1.8–7.7)
NEUTROPHILS NFR BLD: 63.9 % (ref 38–73)
NONHDLC SERPL-MCNC: 221 MG/DL
NRBC BLD-RTO: 0 /100 WBC
PLATELET # BLD AUTO: 265 K/UL (ref 150–450)
PMV BLD AUTO: 10.5 FL (ref 9.2–12.9)
POTASSIUM SERPL-SCNC: 4 MMOL/L (ref 3.5–5.1)
PROT SERPL-MCNC: 7.5 G/DL (ref 6–8.4)
RBC # BLD AUTO: 4.7 M/UL (ref 4–5.4)
SODIUM SERPL-SCNC: 140 MMOL/L (ref 136–145)
T4 FREE SERPL-MCNC: 0.9 NG/DL (ref 0.71–1.51)
TRIGL SERPL-MCNC: 119 MG/DL (ref 30–150)
TSH SERPL DL<=0.005 MIU/L-ACNC: 1.7 UIU/ML (ref 0.4–4)
WBC # BLD AUTO: 5.16 K/UL (ref 3.9–12.7)

## 2022-10-25 PROCEDURE — 85025 COMPLETE CBC W/AUTO DIFF WBC: CPT

## 2022-10-25 PROCEDURE — 84443 ASSAY THYROID STIM HORMONE: CPT

## 2022-10-25 PROCEDURE — 99999 PR PBB SHADOW E&M-EST. PATIENT-LVL III: CPT | Mod: PBBFAC,,,

## 2022-10-25 PROCEDURE — 99396 PREV VISIT EST AGE 40-64: CPT | Mod: 25,S$GLB,,

## 2022-10-25 PROCEDURE — 99999 PR PBB SHADOW E&M-EST. PATIENT-LVL III: ICD-10-PCS | Mod: PBBFAC,,,

## 2022-10-25 PROCEDURE — 83036 HEMOGLOBIN GLYCOSYLATED A1C: CPT

## 2022-10-25 PROCEDURE — 80061 LIPID PANEL: CPT

## 2022-10-25 PROCEDURE — 84439 ASSAY OF FREE THYROXINE: CPT

## 2022-10-25 PROCEDURE — 82306 VITAMIN D 25 HYDROXY: CPT

## 2022-10-25 PROCEDURE — 96372 PR INJECTION,THERAP/PROPH/DIAG2ST, IM OR SUBCUT: ICD-10-PCS | Mod: S$GLB,,,

## 2022-10-25 PROCEDURE — 80053 COMPREHEN METABOLIC PANEL: CPT

## 2022-10-25 PROCEDURE — 99396 PR PREVENTIVE VISIT,EST,40-64: ICD-10-PCS | Mod: 25,S$GLB,,

## 2022-10-25 PROCEDURE — 96372 THER/PROPH/DIAG INJ SC/IM: CPT | Mod: S$GLB,,,

## 2022-10-25 PROCEDURE — 36415 COLL VENOUS BLD VENIPUNCTURE: CPT

## 2022-10-25 RX ORDER — MUPIROCIN 20 MG/G
OINTMENT TOPICAL
Qty: 30 G | Refills: 0 | Status: SHIPPED | OUTPATIENT
Start: 2022-10-25

## 2022-10-25 RX ORDER — DEXAMETHASONE SODIUM PHOSPHATE 4 MG/ML
4 INJECTION, SOLUTION INTRA-ARTICULAR; INTRALESIONAL; INTRAMUSCULAR; INTRAVENOUS; SOFT TISSUE ONCE
Status: COMPLETED | OUTPATIENT
Start: 2022-10-25 | End: 2022-10-25

## 2022-10-25 RX ORDER — MOMETASONE FUROATE 1 MG/G
15 OINTMENT TOPICAL DAILY
COMMUNITY
End: 2022-10-25 | Stop reason: SDUPTHER

## 2022-10-25 RX ORDER — FLUTICASONE PROPIONATE 50 MCG
1 SPRAY, SUSPENSION (ML) NASAL DAILY
Qty: 18 ML | Refills: 11 | Status: SHIPPED | OUTPATIENT
Start: 2022-10-25 | End: 2024-02-26 | Stop reason: SDUPTHER

## 2022-10-25 RX ORDER — MOMETASONE FUROATE 1 MG/G
15 OINTMENT TOPICAL DAILY
Qty: 1 G | Refills: 3 | Status: SHIPPED | OUTPATIENT
Start: 2022-10-25 | End: 2022-10-25 | Stop reason: SDUPTHER

## 2022-10-25 RX ORDER — MUPIROCIN 20 MG/G
OINTMENT TOPICAL 3 TIMES DAILY
Qty: 1 G | Refills: 0 | Status: SHIPPED | OUTPATIENT
Start: 2022-10-25 | End: 2022-10-25 | Stop reason: SDUPTHER

## 2022-10-25 RX ORDER — MOMETASONE FUROATE 1 MG/G
OINTMENT TOPICAL
Qty: 45 G | Refills: 3 | Status: SHIPPED | OUTPATIENT
Start: 2022-10-25 | End: 2023-08-04

## 2022-10-25 RX ADMIN — DEXAMETHASONE SODIUM PHOSPHATE 4 MG: 4 INJECTION, SOLUTION INTRA-ARTICULAR; INTRALESIONAL; INTRAMUSCULAR; INTRAVENOUS; SOFT TISSUE at 10:10

## 2022-10-25 NOTE — PROGRESS NOTES
Subjective:       Patient ID: Rosetta Toro is a 62 y.o. female.    Chief Complaint: Follow-up (Annual check up- wants labs is fasting )      Rosetta Toro is a 62 y.o. female who presents to the clinic today for annual check-up. She is doing well, only concern today is left shoulder pain that started about 3 months ago when she reached to  something. She reports the same occurrence on the right shoulder, received a steroid shot and it made her feel better.   - she reports positive family history of fatty liver, we discussed adding Vitamin E to supplement and liver health  - she reports having high cholesterol, however it was reviewed and discussed that her HDL were elevated  - She is pre-diabetic, not on medication. Adherence to low sugar, low carb diet and walks daily.    Health Maintenance Reviewed and updated:  Tests to Keep You Healthy  Mammogram: Met on 3/25/2022  Colon Cancer Screening: Met on 11/16/2021, negative cologuard  Cervical Cancer Screening: Met on 11/8/2021    Reviewed family, medical, surgical, and social history.    Patient Active Problem List   Diagnosis    Bursitis of right shoulder    Primary osteoarthritis    Uterine fibroid    Neuroma    Hallux valgus of right foot    Prediabetes    Plantar fasciitis    Other hyperlipidemia    Colon cancer screening    Allergic rhinitis    Need for vaccination        Past Medical History:   Diagnosis Date    Chicken pox     Hepatitis A     fatty liver    Hyperlipidemia     Osteoarthritis     Shingles         Past Surgical History:   Procedure Laterality Date    eximer laser      EYE SURGERY  May 31, 2002    Excimer Laser    MYOMECTOMY          Family History   Problem Relation Age of Onset    Cancer Mother     Stomach cancer Mother     Breast cancer Neg Hx     Ovarian cancer Neg Hx         Social History     Socioeconomic History    Marital status:    Tobacco Use    Smoking status: Former     Packs/day: 0.00     Years: 0.00     Pack  years: 0.00     Types: Cigarettes    Smokeless tobacco: Former   Substance and Sexual Activity    Alcohol use: No    Drug use: No    Sexual activity: Never        Allergies as of 10/25/2022 - Reviewed 10/25/2022   Allergen Reaction Noted    Merthiolate (benzalkonium) [benzalkonium chloride]  09/23/2015        Current Outpatient Medications on File Prior to Visit   Medication Sig Dispense Refill    cholecalciferol, vitamin D3, 5,000 unit Tab Take 5,000 Units by mouth once daily.      estradioL (ESTRACE) 0.01 % (0.1 mg/gram) vaginal cream Place a pea-sized amount inside vagina every night for 1 week, and then 2-3 times a week. 42.5 g 6    triamcinolone acetonide 0.1% (KENALOG) 0.1 % ointment Apply topically 2 (two) times daily. 80 g 2    [DISCONTINUED] benzonatate (TESSALON) 100 MG capsule Take 1 capsule (100 mg total) by mouth 3 (three) times daily as needed for Cough. 30 capsule 2    [DISCONTINUED] fluconazole (DIFLUCAN) 150 MG Tab TAKE 1 TABLET BY MOUTH ONCE. REFILL AND RE DOSE IF SYMPTOMS RECUR FOR 1 DOSE      [DISCONTINUED] fluticasone propionate (FLONASE) 50 mcg/actuation nasal spray 1 spray (50 mcg total) by Each Nostril route once daily. 18 mL 11    [DISCONTINUED] mometasone (ELOCON) 0.1 % ointment Apply 15 g topically once daily.       No current facility-administered medications on file prior to visit.          Review of Systems   Constitutional:  Negative for chills and fever.   HENT:  Negative for congestion.    Respiratory:  Negative for cough and shortness of breath.    Cardiovascular:  Negative for chest pain and leg swelling.   Gastrointestinal:  Negative for abdominal pain, constipation, diarrhea, nausea and vomiting.   Genitourinary:  Negative for difficulty urinating.   Musculoskeletal:         Left shoulder pain, only on extension   Neurological:  Negative for dizziness and headaches.   All other systems reviewed and are negative.        Objective:      /70 (BP Location: Left arm, Patient  "Position: Sitting, BP Method: Medium (Automatic))   Pulse 78   Resp 18   Ht 5' 2" (1.575 m)   Wt 59.1 kg (130 lb 3.2 oz)   LMP  (LMP Unknown)   SpO2 99%   BMI 23.81 kg/m²   Physical Exam  Vitals and nursing note reviewed.   Constitutional:       Appearance: Normal appearance.   HENT:      Head: Normocephalic and atraumatic.      Nose: Nose normal.   Eyes:      Pupils: Pupils are equal, round, and reactive to light.   Cardiovascular:      Rate and Rhythm: Normal rate and regular rhythm.      Heart sounds: Normal heart sounds.   Pulmonary:      Effort: Pulmonary effort is normal.      Breath sounds: Normal breath sounds.   Abdominal:      General: Abdomen is flat.   Musculoskeletal:         General: Normal range of motion.      Cervical back: Normal range of motion.      Comments: Tenderness in right shoulder with extension but no decreased ROM   Skin:     General: Skin is warm and dry.   Neurological:      Mental Status: She is alert and oriented to person, place, and time.   Psychiatric:         Mood and Affect: Mood normal.         Behavior: Behavior normal.         Thought Content: Thought content normal.         Judgment: Judgment normal.       Assessment and Plan:       Rosetta was seen today for follow-up.    Diagnoses and all orders for this visit:    Other hyperlipidemia  -     fluticasone propionate (FLONASE) 50 mcg/actuation nasal spray; 1 spray (50 mcg total) by Each Nostril route once daily.  -     CBC Auto Differential; Future  -     Comprehensive Metabolic Panel; Future  -     Hemoglobin A1C; Future  -     Lipid Panel; Future  -     TSH; Future  -     T4, Free; Future  -     Vitamin D; Future    Allergic rhinitis, unspecified seasonality, unspecified trigger  -     fluticasone propionate (FLONASE) 50 mcg/actuation nasal spray; 1 spray (50 mcg total) by Each Nostril route once daily.    Prediabetes  -     fluticasone propionate (FLONASE) 50 mcg/actuation nasal spray; 1 spray (50 mcg total) by " Each Nostril route once daily.  -     CBC Auto Differential; Future  -     Comprehensive Metabolic Panel; Future  -     Hemoglobin A1C; Future  -     Lipid Panel; Future  -     TSH; Future  -     T4, Free; Future  -     Vitamin D; Future    Well adult exam  -     fluticasone propionate (FLONASE) 50 mcg/actuation nasal spray; 1 spray (50 mcg total) by Each Nostril route once daily.  -     CBC Auto Differential; Future  -     Comprehensive Metabolic Panel; Future  -     Hemoglobin A1C; Future  -     Lipid Panel; Future  -     TSH; Future  -     T4, Free; Future  -     Vitamin D; Future    Psoriasis  -     mometasone (ELOCON) 0.1 % ointment; Apply 15 g topically once daily.  -     mupirocin (BACTROBAN) 2 % ointment; Apply topically 3 (three) times daily.    Left shoulder pain, unspecified chronicity  -     dexAMETHasone injection 4 mg      PLAN: Applies to all diagnosis and orders listed above.  - fasting labs ordered, she is fasting today  - decadron shot today  - refilled mometasone cream for psoriasis on elbows  - bactroban ointment refilled to have on hand  - Flonase refilled for allergies, uses as needed  - Follow up in about 1 year (around 10/25/2023) for annual.     Discussed with patient the plan of care. Medications reviewed. Medication side effects discussed. Patient has no questions or concerns at this time. Reviewed, monitored, evaluated, and assessed chronic conditions as outlined above. Informed patient to please notify me with any questions or concerns at anytime.    Thank you,  KRISTEN Richmond  Family Medicine  [facility]

## 2022-10-25 NOTE — TELEPHONE ENCOUNTER
----- Message from Laura Woodward LPN sent at 10/25/2022 10:49 AM CDT -----  Contact: adrian  Please resubmit  ----- Message -----  From: Roma Herrera  Sent: 10/25/2022  10:48 AM CDT  To: Domo Altamirano Staff    Type:  Pharmacy Calling to Clarify an RX    Name of Caller:  Adrian  Pharmacy Name:    [facility] [address] [place] [id number]  Phone: [phone] Fax: [phone]  Prescription Name:    1.mometasone (ELOCON) 0.1 % ointment  2.mupirocin (BACTROBAN) 2 % ointment  What do they need to clarify?:  directions and how much to apply   Best Call Back Number:  [phone]  Additional Information:  patient is waiting at pharmacy

## 2022-10-27 ENCOUNTER — TELEPHONE (OUTPATIENT)
Dept: FAMILY MEDICINE | Facility: CLINIC | Age: 62
End: 2022-10-27
Payer: COMMERCIAL

## 2022-10-27 NOTE — TELEPHONE ENCOUNTER
----- Message from Evelia Oliveros NP sent at 10/27/2022  1:48 PM CDT -----  Please call the patient and let her know her labs are for the most part within acceptable range.   It did confirm that she has a low Vitamin D level. Please make sure she is taking her Vitamin D supplement.    Blood sugar test showed prediabetes. I recommend a low sugar/low carbohydrate diet and regular exercise. No need for prescription medication at this time. Recheck in 12 months.     There was an increase in cholesterol level. I would recommend reducing saturated and trans fats. Eat foods rich in omega-3 fatty acids, increase soluble fiber and protein in your diet and incorporate an exercise routine, at least 30 min a day 5 days a week.       Thank you,  KRISTEN Richmond

## 2022-10-27 NOTE — PROGRESS NOTES
Please call the patient and let her know her labs are for the most part within acceptable range.   It did confirm that she has a low Vitamin D level. Please make sure she is taking her Vitamin D supplement.    Blood sugar test showed prediabetes. I recommend a low sugar/low carbohydrate diet and regular exercise. No need for prescription medication at this time. Recheck in 12 months.     There was an increase in cholesterol level. I would recommend reducing saturated and trans fats. Eat foods rich in omega-3 fatty acids, increase soluble fiber and protein in your diet and incorporate an exercise routine, at least 30 min a day 5 days a week.      Thank you,  KRISTEN Richmond

## 2022-11-23 ENCOUNTER — OFFICE VISIT (OUTPATIENT)
Dept: OBSTETRICS AND GYNECOLOGY | Facility: CLINIC | Age: 62
End: 2022-11-23
Payer: COMMERCIAL

## 2022-11-23 VITALS
SYSTOLIC BLOOD PRESSURE: 118 MMHG | BODY MASS INDEX: 23.95 KG/M2 | DIASTOLIC BLOOD PRESSURE: 74 MMHG | WEIGHT: 130.94 LBS

## 2022-11-23 DIAGNOSIS — Z01.419 WELL WOMAN EXAM WITH ROUTINE GYNECOLOGICAL EXAM: Primary | ICD-10-CM

## 2022-11-23 DIAGNOSIS — N95.2 ATROPHIC VAGINITIS: ICD-10-CM

## 2022-11-23 DIAGNOSIS — Z12.31 VISIT FOR SCREENING MAMMOGRAM: ICD-10-CM

## 2022-11-23 PROCEDURE — 88142 CYTOPATH C/V THIN LAYER: CPT | Performed by: OBSTETRICS & GYNECOLOGY

## 2022-11-23 PROCEDURE — 99396 PR PREVENTIVE VISIT,EST,40-64: ICD-10-PCS | Mod: S$GLB,,, | Performed by: OBSTETRICS & GYNECOLOGY

## 2022-11-23 PROCEDURE — 99999 PR PBB SHADOW E&M-EST. PATIENT-LVL III: ICD-10-PCS | Mod: PBBFAC,,, | Performed by: OBSTETRICS & GYNECOLOGY

## 2022-11-23 PROCEDURE — 99396 PREV VISIT EST AGE 40-64: CPT | Mod: S$GLB,,, | Performed by: OBSTETRICS & GYNECOLOGY

## 2022-11-23 PROCEDURE — 99999 PR PBB SHADOW E&M-EST. PATIENT-LVL III: CPT | Mod: PBBFAC,,, | Performed by: OBSTETRICS & GYNECOLOGY

## 2022-11-23 RX ORDER — ESTRADIOL 0.1 MG/G
CREAM VAGINAL
Qty: 42.5 G | Refills: 6 | Status: SHIPPED | OUTPATIENT
Start: 2022-11-23 | End: 2024-03-14 | Stop reason: SDUPTHER

## 2022-11-23 NOTE — PROGRESS NOTES
CC: Annual check-up    SUBJECTIVE:   62 y.o. female   for annual routine Pap and checkup. No LMP recorded (lmp unknown). Patient is postmenopausal..  She has no unusual complaints and estarce helps with vag dryness.      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  Past Medical History:   Diagnosis Date    Chicken pox     Hepatitis A     fatty liver    Hyperlipidemia     Osteoarthritis     Shingles      Past Surgical History:   Procedure Laterality Date    eximer laser      EYE SURGERY  May 31, 2002    Excimer Laser    MYOMECTOMY       Social History     Socioeconomic History    Marital status:    Tobacco Use    Smoking status: Former     Packs/day: 0.00     Years: 0.00     Pack years: 0.00     Types: Cigarettes    Smokeless tobacco: Former   Substance and Sexual Activity    Alcohol use: No    Drug use: No    Sexual activity: Never     Family History   Problem Relation Age of Onset    Cancer Mother     Stomach cancer Mother     Breast cancer Neg Hx     Ovarian cancer Neg Hx      OB History    Para Term  AB Living   0 0 0 0 0 0   SAB IAB Ectopic Multiple Live Births   0 0 0 0           Current Outpatient Medications   Medication Sig Dispense Refill    cholecalciferol, vitamin D3, 5,000 unit Tab Take 5,000 Units by mouth once daily.      fluticasone propionate (FLONASE) 50 mcg/actuation nasal spray 1 spray (50 mcg total) by Each Nostril route once daily. 18 mL 11    mometasone (ELOCON) 0.1 % ointment Apply 1 gram daily 45 g 3    mupirocin (BACTROBAN) 2 % ointment Apply 1 gram tid 30 g 0    triamcinolone acetonide 0.1% (KENALOG) 0.1 % ointment Apply topically 2 (two) times daily. 80 g 2    estradioL (ESTRACE) 0.01 % (0.1 mg/gram) vaginal cream Place a pea-sized amount inside vagina every night for 1 week, and then 2-3 times a week. 42.5 g 6     No current facility-administered medications for this visit.      Allergies: Merthiolate (benzalkonium) [benzalkonium chloride]     ROS:  Constitutional: no weight loss, weight gain, fever, fatigue  Eyes:  No vision changes, glasses/contacts  ENT/Mouth: No ulcers, sinus problems, ears ringing, headache  Cardiovascular: No inability to lie flat, chest pain, exercise intolerance, swelling, heart palpitations  Respiratory: No wheezing, coughing blood, shortness of breath, or cough  Gastrointestinal: No diarrhea, bloody stool, nausea/vomiting, constipation, gas, hemorrhoids  Genitourinary: No blood in urine, painful urination, urgency of urination, frequency of urination, incomplete emptying, incontinence, abnormal bleeding, painful periods, heavy periods, vaginal discharge, vaginal odor, painful intercourse, sexual problems, bleeding after intercourse.  Musculoskeletal: No muscle weakness  Skin/Breast: No painful breasts, nipple discharge, masses, rash, ulcers  Neurological: No passing out, seizures, numbness, headache  Endocrine: No diabetes, hypothyroid, hyperthyroid, hot flashes, hair loss, abnormal hair growth, ance  Psychiatric: No depression, crying  Hematologic: No bruises, bleeding, swollen lymph nodes, anemia.      OBJECTIVE:   The patient appears well, alert, oriented x 3, in no distress.  /74   Wt 59.4 kg (130 lb 15.3 oz)   LMP  (LMP Unknown)   BMI 23.95 kg/m²   NECK: no thyromegaly, trachea midline  SKIN: no acne, striae, hirsutism  BREAST EXAM: breasts appear normal, no suspicious masses, no skin or nipple changes or axillary nodes  ABDOMEN: no hernias, masses, or hepatosplenomegaly  GENITALIA: normal external genitalia, no erythema, no discharge  URETHRA: normal urethra, normal urethral meatus  VAGINA: mucosal atrophy  CERVIX: no lesions or cervical motion tenderness  UTERUS: enlarged, 14-16 weeks size and irregular  ADNEXA: normal adnexa and no mass, fullness, tenderness      ASSESSMENT:   well woman  no contraindication to continue hormonal therapy  1.  Well woman exam with routine gynecological exam    2. Atrophic vaginitis    3. Visit for screening mammogram        PLAN:   mammogram  pap smear  return annually or prn  Orders Placed This Encounter    Mammo Digital Screening Bilat w/ Alberto    Liquid-Based Pap Smear, Screening    estradioL (ESTRACE) 0.01 % (0.1 mg/gram) vaginal cream

## 2022-12-01 LAB
FINAL PATHOLOGIC DIAGNOSIS: NORMAL
Lab: NORMAL

## 2023-02-02 ENCOUNTER — OFFICE VISIT (OUTPATIENT)
Dept: FAMILY MEDICINE | Facility: CLINIC | Age: 63
End: 2023-02-02
Payer: COMMERCIAL

## 2023-02-02 ENCOUNTER — OFFICE VISIT (OUTPATIENT)
Dept: PODIATRY | Facility: CLINIC | Age: 63
End: 2023-02-02
Payer: COMMERCIAL

## 2023-02-02 VITALS
HEIGHT: 62 IN | BODY MASS INDEX: 24.36 KG/M2 | HEART RATE: 79 BPM | WEIGHT: 132.38 LBS | DIASTOLIC BLOOD PRESSURE: 82 MMHG | SYSTOLIC BLOOD PRESSURE: 125 MMHG

## 2023-02-02 VITALS
HEART RATE: 90 BPM | DIASTOLIC BLOOD PRESSURE: 76 MMHG | HEIGHT: 62 IN | OXYGEN SATURATION: 97 % | BODY MASS INDEX: 24.38 KG/M2 | SYSTOLIC BLOOD PRESSURE: 126 MMHG | WEIGHT: 132.5 LBS | RESPIRATION RATE: 15 BRPM

## 2023-02-02 DIAGNOSIS — M25.512 CHRONIC LEFT SHOULDER PAIN: Primary | ICD-10-CM

## 2023-02-02 DIAGNOSIS — D36.10 NEUROMA: ICD-10-CM

## 2023-02-02 DIAGNOSIS — M72.2 PLANTAR FASCIITIS: Primary | ICD-10-CM

## 2023-02-02 DIAGNOSIS — M20.11 HALLUX VALGUS OF RIGHT FOOT: ICD-10-CM

## 2023-02-02 DIAGNOSIS — G89.29 CHRONIC LEFT SHOULDER PAIN: Primary | ICD-10-CM

## 2023-02-02 PROCEDURE — 99999 PR PBB SHADOW E&M-EST. PATIENT-LVL III: ICD-10-PCS | Mod: PBBFAC,,, | Performed by: PODIATRIST

## 2023-02-02 PROCEDURE — 99214 PR OFFICE/OUTPT VISIT, EST, LEVL IV, 30-39 MIN: ICD-10-PCS | Mod: S$GLB,,, | Performed by: PODIATRIST

## 2023-02-02 PROCEDURE — 99999 PR PBB SHADOW E&M-EST. PATIENT-LVL III: ICD-10-PCS | Mod: PBBFAC,,, | Performed by: FAMILY MEDICINE

## 2023-02-02 PROCEDURE — 99214 OFFICE O/P EST MOD 30 MIN: CPT | Mod: S$GLB,,, | Performed by: PODIATRIST

## 2023-02-02 PROCEDURE — 20610 PR DRAIN/INJECT LARGE JOINT/BURSA: ICD-10-PCS | Mod: LT,S$GLB,, | Performed by: FAMILY MEDICINE

## 2023-02-02 PROCEDURE — 99213 PR OFFICE/OUTPT VISIT, EST, LEVL III, 20-29 MIN: ICD-10-PCS | Mod: 25,S$GLB,, | Performed by: FAMILY MEDICINE

## 2023-02-02 PROCEDURE — 99999 PR PBB SHADOW E&M-EST. PATIENT-LVL III: CPT | Mod: PBBFAC,,, | Performed by: FAMILY MEDICINE

## 2023-02-02 PROCEDURE — 20610 DRAIN/INJ JOINT/BURSA W/O US: CPT | Mod: LT,S$GLB,, | Performed by: FAMILY MEDICINE

## 2023-02-02 PROCEDURE — 99999 PR PBB SHADOW E&M-EST. PATIENT-LVL III: CPT | Mod: PBBFAC,,, | Performed by: PODIATRIST

## 2023-02-02 PROCEDURE — 99213 OFFICE O/P EST LOW 20 MIN: CPT | Mod: 25,S$GLB,, | Performed by: FAMILY MEDICINE

## 2023-02-02 RX ORDER — TRIAMCINOLONE ACETONIDE 40 MG/ML
80 INJECTION, SUSPENSION INTRA-ARTICULAR; INTRAMUSCULAR
Status: DISCONTINUED | OUTPATIENT
Start: 2023-02-02 | End: 2023-08-04

## 2023-02-02 RX ORDER — LIDOCAINE HYDROCHLORIDE 10 MG/ML
2 INJECTION, SOLUTION EPIDURAL; INFILTRATION; INTRACAUDAL; PERINEURAL
Status: DISCONTINUED | OUTPATIENT
Start: 2023-02-02 | End: 2023-08-04

## 2023-02-02 RX ORDER — BUPIVACAINE HYDROCHLORIDE 2.5 MG/ML
2 INJECTION, SOLUTION EPIDURAL; INFILTRATION; INTRACAUDAL
Status: DISCONTINUED | OUTPATIENT
Start: 2023-02-02 | End: 2023-08-04

## 2023-02-02 NOTE — PROGRESS NOTES
" Patient ID: Rosetta Toro is a 62 y.o. female.    Chief Complaint: Shoulder Pain (Bilateral )      Reviewed family, medical, surgical, and social history.    No cp/sob  No change in mental status  No fever  No asymmetrical limb swelling    Shoulder Injection Procedure Note    Pre-operative Diagnosis: left shoulder osteoarthritis    Post-operative Diagnosis: same    Indications: Symptom relief from osteoarthritis    Anesthesia: Lidocaine 2% without epinephrine without added sodium bicarbonate    Procedure Details     Verbal consent was obtained for the procedure. The shoulder was prepped with iodine and the skin was anesthetized. Using a 22 gauge needle the glenohumeral joint is injected with 0 mL 1% lidocaine and 2 mL of triamcinolone (KENALOG) 80mg/ml under the posterior aspect of the acromion. The injection site was cleansed with topical isopropyl alcohol and a dressing was applied.    Complications:  None; patient tolerated the procedure well.      Objective:      /76 (BP Location: Left arm, Patient Position: Sitting, BP Method: Medium (Manual))   Pulse 90   Resp 15   Ht 5' 2" (1.575 m)   Wt 60.1 kg (132 lb 8 oz)   LMP  (LMP Unknown)   SpO2 97%   BMI 24.23 kg/m²   RRR, CTAB, s/nt/nd, no c/c/e, non-toxic appearing, no focal weakness  Assessment:       1. Chronic left shoulder pain        Plan:       Chronic left shoulder pain  -     triamcinolone acetonide injection 80 mg  -     LIDOcaine (PF) 10 mg/ml (1%) injection 20 mg  -     BUPivacaine (PF) 0.25% (2.5 mg/ml) injection 5 mg              Continue current medicines, any changes noted above  Injection went well in left shoulder, see procedure note  Labs, radiology studies, and procedures/notes from the last 3 months were reviewed.    Risks, benefits, and side effects were discussed with the patient. All questions were answered to the fullest satisfaction of the patient, and pt verbalized understanding and agreement to treatment plan. Pt was to " call with any new or worsening symptoms, or present to the ER.

## 2023-02-16 ENCOUNTER — OFFICE VISIT (OUTPATIENT)
Dept: FAMILY MEDICINE | Facility: CLINIC | Age: 63
End: 2023-02-16
Payer: COMMERCIAL

## 2023-02-16 VITALS
SYSTOLIC BLOOD PRESSURE: 108 MMHG | WEIGHT: 132.38 LBS | OXYGEN SATURATION: 96 % | HEART RATE: 82 BPM | HEIGHT: 62 IN | RESPIRATION RATE: 17 BRPM | DIASTOLIC BLOOD PRESSURE: 65 MMHG | BODY MASS INDEX: 24.36 KG/M2

## 2023-02-16 DIAGNOSIS — G89.29 CHRONIC RIGHT SHOULDER PAIN: Primary | ICD-10-CM

## 2023-02-16 DIAGNOSIS — M25.511 CHRONIC RIGHT SHOULDER PAIN: Primary | ICD-10-CM

## 2023-02-16 PROCEDURE — 20610 DRAIN/INJ JOINT/BURSA W/O US: CPT | Mod: RT,S$GLB,, | Performed by: FAMILY MEDICINE

## 2023-02-16 PROCEDURE — 99999 PR PBB SHADOW E&M-EST. PATIENT-LVL III: ICD-10-PCS | Mod: PBBFAC,,, | Performed by: FAMILY MEDICINE

## 2023-02-16 PROCEDURE — 99999 PR PBB SHADOW E&M-EST. PATIENT-LVL III: CPT | Mod: PBBFAC,,, | Performed by: FAMILY MEDICINE

## 2023-02-16 PROCEDURE — 99213 OFFICE O/P EST LOW 20 MIN: CPT | Mod: 25,S$GLB,, | Performed by: FAMILY MEDICINE

## 2023-02-16 PROCEDURE — 20610 PR DRAIN/INJECT LARGE JOINT/BURSA: ICD-10-PCS | Mod: RT,S$GLB,, | Performed by: FAMILY MEDICINE

## 2023-02-16 PROCEDURE — 99213 PR OFFICE/OUTPT VISIT, EST, LEVL III, 20-29 MIN: ICD-10-PCS | Mod: 25,S$GLB,, | Performed by: FAMILY MEDICINE

## 2023-02-16 RX ORDER — LIDOCAINE HYDROCHLORIDE 10 MG/ML
2 INJECTION, SOLUTION EPIDURAL; INFILTRATION; INTRACAUDAL; PERINEURAL
Status: DISCONTINUED | OUTPATIENT
Start: 2023-02-16 | End: 2023-08-04

## 2023-02-16 RX ORDER — BUPIVACAINE HYDROCHLORIDE 2.5 MG/ML
2 INJECTION, SOLUTION EPIDURAL; INFILTRATION; INTRACAUDAL
Status: DISCONTINUED | OUTPATIENT
Start: 2023-02-16 | End: 2023-08-04

## 2023-02-16 RX ORDER — TRIAMCINOLONE ACETONIDE 40 MG/ML
80 INJECTION, SUSPENSION INTRA-ARTICULAR; INTRAMUSCULAR
Status: DISCONTINUED | OUTPATIENT
Start: 2023-02-16 | End: 2023-08-04

## 2023-02-16 NOTE — PROGRESS NOTES
" Patient ID: Rosetta Toro is a 62 y.o. female.    Chief Complaint: Shoulder Pain (R shoulder)      Reviewed family, medical, surgical, and social history.    No cp/sob  No change in mental status  No fever  No asymmetrical limb swellingShoulder Injection Procedure Note    Pre-operative Diagnosis: right shoulder osteoarthritis    Post-operative Diagnosis: same    Indications: Symptom relief from osteoarthritis    Anesthesia: Lidocaine 2% without epinephrine without added sodium bicarbonate    Procedure Details     Verbal consent was obtained for the procedure. The shoulder was prepped with iodine and the skin was anesthetized. Using a 22 gauge needle the glenohumeral joint is injected with 2 mL 1% lidocaine and 2 mL of triamcinolone (KENALOG) 80mg/ml under the posterior aspect of the acromion. The injection site was cleansed with topical isopropyl alcohol and a dressing was applied.    Complications:  None; patient tolerated the procedure well.      Objective:      /65 (BP Location: Right arm, Patient Position: Sitting, BP Method: Medium (Manual))   Pulse 82   Resp 17   Ht 5' 2" (1.575 m)   Wt 60.1 kg (132 lb 6.4 oz)   LMP  (LMP Unknown)   SpO2 96%   BMI 24.22 kg/m²   RRR, CTAB, s/nt/nd, no c/c/e, non-toxic appearing, no focal weakness  Assessment:       1. Chronic right shoulder pain        Plan:       Chronic right shoulder pain              Continue current medicines, any changes noted above  Tolerated injection well  Labs, radiology studies, and procedures/notes from the last 3 months were reviewed.    Risks, benefits, and side effects were discussed with the patient. All questions were answered to the fullest satisfaction of the patient, and pt verbalized understanding and agreement to treatment plan. Pt was to call with any new or worsening symptoms, or present to the ER.    "

## 2023-03-27 ENCOUNTER — HOSPITAL ENCOUNTER (OUTPATIENT)
Dept: RADIOLOGY | Facility: HOSPITAL | Age: 63
Discharge: HOME | End: 2023-03-27
Attending: OBSTETRICS & GYNECOLOGY
Payer: COMMERCIAL

## 2023-03-27 DIAGNOSIS — Z01.419 WELL WOMAN EXAM WITH ROUTINE GYNECOLOGICAL EXAM: ICD-10-CM

## 2023-03-27 PROCEDURE — 77067 SCR MAMMO BI INCL CAD: CPT | Mod: 26,,, | Performed by: RADIOLOGY

## 2023-03-27 PROCEDURE — 77067 SCR MAMMO BI INCL CAD: CPT | Mod: TC

## 2023-03-27 PROCEDURE — 77063 BREAST TOMOSYNTHESIS BI: CPT | Mod: 26,,, | Performed by: RADIOLOGY

## 2023-03-27 PROCEDURE — 77067 MAMMO DIGITAL SCREENING BILAT WITH TOMO: ICD-10-PCS | Mod: 26,,, | Performed by: RADIOLOGY

## 2023-03-27 PROCEDURE — 77063 MAMMO DIGITAL SCREENING BILAT WITH TOMO: ICD-10-PCS | Mod: 26,,, | Performed by: RADIOLOGY

## 2023-08-04 ENCOUNTER — OFFICE VISIT (OUTPATIENT)
Dept: FAMILY MEDICINE | Facility: CLINIC | Age: 63
End: 2023-08-04
Payer: COMMERCIAL

## 2023-08-04 VITALS
OXYGEN SATURATION: 96 % | RESPIRATION RATE: 16 BRPM | WEIGHT: 131 LBS | SYSTOLIC BLOOD PRESSURE: 110 MMHG | HEART RATE: 99 BPM | BODY MASS INDEX: 24.11 KG/M2 | DIASTOLIC BLOOD PRESSURE: 76 MMHG | HEIGHT: 62 IN | TEMPERATURE: 99 F

## 2023-08-04 DIAGNOSIS — M25.511 CHRONIC PAIN OF BOTH SHOULDERS: Primary | ICD-10-CM

## 2023-08-04 DIAGNOSIS — M25.512 LEFT SHOULDER PAIN, UNSPECIFIED CHRONICITY: Primary | ICD-10-CM

## 2023-08-04 DIAGNOSIS — M25.512 CHRONIC PAIN OF BOTH SHOULDERS: Primary | ICD-10-CM

## 2023-08-04 DIAGNOSIS — G89.29 CHRONIC PAIN OF BOTH SHOULDERS: Primary | ICD-10-CM

## 2023-08-04 PROBLEM — Z12.11 COLON CANCER SCREENING: Status: RESOLVED | Noted: 2021-11-03 | Resolved: 2023-08-04

## 2023-08-04 PROCEDURE — 99213 PR OFFICE/OUTPT VISIT, EST, LEVL III, 20-29 MIN: ICD-10-PCS | Mod: S$GLB,,, | Performed by: STUDENT IN AN ORGANIZED HEALTH CARE EDUCATION/TRAINING PROGRAM

## 2023-08-04 PROCEDURE — 99213 OFFICE O/P EST LOW 20 MIN: CPT | Mod: S$GLB,,, | Performed by: STUDENT IN AN ORGANIZED HEALTH CARE EDUCATION/TRAINING PROGRAM

## 2023-08-04 PROCEDURE — 99999 PR PBB SHADOW E&M-EST. PATIENT-LVL V: ICD-10-PCS | Mod: PBBFAC,,, | Performed by: STUDENT IN AN ORGANIZED HEALTH CARE EDUCATION/TRAINING PROGRAM

## 2023-08-04 PROCEDURE — 99999 PR PBB SHADOW E&M-EST. PATIENT-LVL V: CPT | Mod: PBBFAC,,, | Performed by: STUDENT IN AN ORGANIZED HEALTH CARE EDUCATION/TRAINING PROGRAM

## 2023-08-04 RX ORDER — MELOXICAM 7.5 MG/1
7.5 TABLET ORAL DAILY
Qty: 30 TABLET | Refills: 1 | Status: SHIPPED | OUTPATIENT
Start: 2023-08-04 | End: 2023-10-25

## 2023-08-04 NOTE — PROGRESS NOTES
Subjective:       Patient ID: Rosetta Toro is a 63 y.o. female.    Chief Complaint: Shoulder Pain (L x february) 2015 issues with the right shoulder   And then back in Feb she had bursitis and got steroid injection  Now the right shoulder has started with pain again        Review of Systems   Constitutional:  Negative for activity change and appetite change.   Respiratory:  Negative for shortness of breath.    Cardiovascular:  Negative for chest pain.   Gastrointestinal:  Negative for abdominal pain.   Genitourinary:  Negative for dysuria.   Integumentary:  Negative for rash.   Psychiatric/Behavioral:  Negative for dysphoric mood and sleep disturbance. The patient is not nervous/anxious.          Objective:      Physical Exam  Constitutional:       General: She is not in acute distress.     Appearance: Normal appearance. She is not ill-appearing.   Eyes:      Conjunctiva/sclera: Conjunctivae normal.   Cardiovascular:      Rate and Rhythm: Normal rate and regular rhythm.      Heart sounds: Normal heart sounds. No murmur heard.  Pulmonary:      Effort: Pulmonary effort is normal. No respiratory distress.      Breath sounds: Normal breath sounds.   Abdominal:      Palpations: Abdomen is soft.   Musculoskeletal:      Right lower leg: No edema.      Left lower leg: No edema.   Skin:     General: Skin is warm and dry.   Neurological:      Mental Status: She is alert. Mental status is at baseline.      Gait: Gait normal.   Psychiatric:         Mood and Affect: Mood normal.         Behavior: Behavior normal.         Thought Content: Thought content normal.         Judgment: Judgment normal.         Assessment:       1. Chronic pain of both shoulders        Plan:       Problem List Items Addressed This Visit    None  Visit Diagnoses       Chronic pain of both shoulders    -  Primary    Relevant Medications    meloxicam (MOBIC) 7.5 MG tablet    Other Relevant Orders    Ambulatory referral/consult to Orthopedics     Ambulatory referral/consult to Physical/Occupational Therapy

## 2023-08-07 ENCOUNTER — HOSPITAL ENCOUNTER (OUTPATIENT)
Dept: RADIOLOGY | Facility: HOSPITAL | Age: 63
Discharge: HOME | End: 2023-08-07
Attending: ORTHOPAEDIC SURGERY
Payer: COMMERCIAL

## 2023-08-07 ENCOUNTER — OFFICE VISIT (OUTPATIENT)
Dept: ORTHOPEDICS | Facility: CLINIC | Age: 63
End: 2023-08-07
Payer: COMMERCIAL

## 2023-08-07 VITALS — RESPIRATION RATE: 18 BRPM | HEIGHT: 62 IN | BODY MASS INDEX: 24.09 KG/M2 | WEIGHT: 130.94 LBS

## 2023-08-07 DIAGNOSIS — M25.512 LEFT SHOULDER PAIN, UNSPECIFIED CHRONICITY: ICD-10-CM

## 2023-08-07 DIAGNOSIS — M25.511 CHRONIC PAIN OF BOTH SHOULDERS: ICD-10-CM

## 2023-08-07 DIAGNOSIS — G89.29 CHRONIC PAIN OF BOTH SHOULDERS: ICD-10-CM

## 2023-08-07 DIAGNOSIS — M25.512 CHRONIC PAIN OF BOTH SHOULDERS: ICD-10-CM

## 2023-08-07 PROCEDURE — 99999 PR PBB SHADOW E&M-EST. PATIENT-LVL III: CPT | Mod: PBBFAC,,, | Performed by: ORTHOPAEDIC SURGERY

## 2023-08-07 PROCEDURE — 99204 PR OFFICE/OUTPT VISIT, NEW, LEVL IV, 45-59 MIN: ICD-10-PCS | Mod: 25,S$GLB,, | Performed by: ORTHOPAEDIC SURGERY

## 2023-08-07 PROCEDURE — 73030 X-RAY EXAM OF SHOULDER: CPT | Mod: 26,LT,, | Performed by: RADIOLOGY

## 2023-08-07 PROCEDURE — 99999 PR PBB SHADOW E&M-EST. PATIENT-LVL III: ICD-10-PCS | Mod: PBBFAC,,, | Performed by: ORTHOPAEDIC SURGERY

## 2023-08-07 PROCEDURE — 20610 LARGE JOINT ASPIRATION/INJECTION: L SUBACROMIAL BURSA: ICD-10-PCS | Mod: LT,S$GLB,, | Performed by: ORTHOPAEDIC SURGERY

## 2023-08-07 PROCEDURE — 73030 XR SHOULDER TRAUMA 3 VIEW LEFT: ICD-10-PCS | Mod: 26,LT,, | Performed by: RADIOLOGY

## 2023-08-07 PROCEDURE — 20610 DRAIN/INJ JOINT/BURSA W/O US: CPT | Mod: LT,S$GLB,, | Performed by: ORTHOPAEDIC SURGERY

## 2023-08-07 PROCEDURE — 73030 X-RAY EXAM OF SHOULDER: CPT | Mod: TC,PN,LT

## 2023-08-07 PROCEDURE — 99204 OFFICE O/P NEW MOD 45 MIN: CPT | Mod: 25,S$GLB,, | Performed by: ORTHOPAEDIC SURGERY

## 2023-08-07 RX ORDER — TRIAMCINOLONE ACETONIDE 40 MG/ML
40 INJECTION, SUSPENSION INTRA-ARTICULAR; INTRAMUSCULAR
Status: DISCONTINUED | OUTPATIENT
Start: 2023-08-07 | End: 2023-08-07 | Stop reason: HOSPADM

## 2023-08-07 RX ADMIN — TRIAMCINOLONE ACETONIDE 40 MG: 40 INJECTION, SUSPENSION INTRA-ARTICULAR; INTRAMUSCULAR at 09:08

## 2023-08-07 NOTE — PROCEDURES
Large Joint Aspiration/Injection: L subacromial bursa    Date/Time: 8/7/2023 9:45 AM    Performed by: Irvin Gonzalez MD  Authorized by: Irvin Gonzalez MD    Consent Done?:  Yes (Verbal)  Indications:  Pain  Site marked: the procedure site was marked    Timeout: prior to procedure the correct patient, procedure, and site was verified      Local anesthesia used?: Yes    Local anesthetic: Ropivicaine.  Anesthetic total (ml):  3      Details:  Needle Size:  20 G  Ultrasonic Guidance for needle placement?: No    Approach:  Posterior  Location:  Shoulder  Site:  L subacromial bursa  Medications:  40 mg triamcinolone acetonide 40 mg/mL  Patient tolerance:  Patient tolerated the procedure well with no immediate complications

## 2023-08-07 NOTE — PROGRESS NOTES
Subjective:      Patient ID: Rosetta Toro is a 63 y.o. female.    Chief Complaint: Pain of the Left Shoulder    HPI  Patient reports at least a 6-8 year history of bilateral shoulder pain today left greater than right.  She is had injections in both shoulders in the past.  There has been no specific recent trauma.  She is complaining of general stiffness and pain with her workouts etc..  Enjoys running and has been running with 2 lb weights  ROS      Objective:    Ortho Exam     Constitutional:   Patient is alert  and oriented in no acute distress  HEENT:  normocephalic atraumatic; PERRL EOMI  Neck:  Supple without adenopathy  Cardiovascular:  Normal rate and rhythm  Pulmonary:  Normal respiratory effort normal chest wall expansion  Abdominal:  Nonprotuberant nondistended  Musculoskeletal:  Patient has a steady nonantalgic gait.  Neck is supple with adequate range of motion  No Lhermitte's or Spurling sign.  Mild limitation of range of motion of the left shoulder with discomfort with full forward flexion and internal rotation.  There is a mildly positive impingement sign.  Negative Yergason's and Speed's testing.  No gross weakness however pain with strength testing against resistance there is no swelling mass or atrophy noted.  There is a normal distal neurologic and vascular examination.  Neurological:  No focal defect; cranial nerves 2-12 grossly intact  Psychiatric/behavioral:  Mood and behavior normal    X-Ray Shoulder Trauma 3 view Left  Narrative: EXAMINATION:  XR SHOULDER TRAUMA 3 VIEW LEFT    CLINICAL HISTORY:  Pain in left shoulder    TECHNIQUE:  Three views of the left shoulder were performed.    COMPARISON  None    FINDINGS:  No acute fracture or dislocation.  No significant soft tissue swelling.    Mild glenohumeral and AC joint degenerative osteoarthrosis.  Impression: Mild degenerative osteoarthrosis.    Electronically signed by: Catrachito Dolan  Date:    08/07/2023  Time:    09:38       My  Radiographs Findings:    I have personally reviewed radiographs and concur with above findings    Assessment:       Encounter Diagnosis   Name Primary?    Chronic pain of both shoulders          Plan:       I have discussed medical condition and treatment options with her at length.  After a verbal consent and sterile prep I injected her left shoulder today without complication with a combination of cc of Kenalog several cc of lidocaine.  She is she is developing some adhesive capsulitis and I have strongly suggested some physical therapy which she agrees to.  She is recently been prescribed Mobic.  GI cardiac and renal precautions were discussed.  Follow up with me in 4-6 weeks sooner if any questions or problems.        Past Medical History:   Diagnosis Date    Chicken pox     Hepatitis A     fatty liver    Hyperlipidemia     Osteoarthritis     Shingles      Past Surgical History:   Procedure Laterality Date    eximer laser      EYE SURGERY  May 31, 2002    Excimer Laser    MYOMECTOMY           Current Outpatient Medications:     cholecalciferol, vitamin D3, 5,000 unit Tab, Take 5,000 Units by mouth once daily., Disp: , Rfl:     estradioL (ESTRACE) 0.01 % (0.1 mg/gram) vaginal cream, Place a pea-sized amount inside vagina every night for 1 week, and then 2-3 times a week., Disp: 42.5 g, Rfl: 6    fluticasone propionate (FLONASE) 50 mcg/actuation nasal spray, 1 spray (50 mcg total) by Each Nostril route once daily., Disp: 18 mL, Rfl: 11    meloxicam (MOBIC) 7.5 MG tablet, Take 1 tablet (7.5 mg total) by mouth once daily., Disp: 30 tablet, Rfl: 1    mupirocin (BACTROBAN) 2 % ointment, Apply 1 gram tid, Disp: 30 g, Rfl: 0    Review of patient's allergies indicates:   Allergen Reactions    Merthiolate (benzalkonium) [benzalkonium chloride]        Family History   Problem Relation Age of Onset    Cancer Mother     Stomach cancer Mother     Breast cancer Neg Hx     Ovarian cancer Neg Hx      Social History      Occupational History    Not on file   Tobacco Use    Smoking status: Former     Current packs/day: 0.00     Types: Cigarettes    Smokeless tobacco: Former   Substance and Sexual Activity    Alcohol use: No    Drug use: No    Sexual activity: Never

## 2023-08-18 ENCOUNTER — CLINICAL SUPPORT (OUTPATIENT)
Dept: REHABILITATION | Facility: HOSPITAL | Age: 63
End: 2023-08-18
Attending: ORTHOPAEDIC SURGERY
Payer: COMMERCIAL

## 2023-08-18 DIAGNOSIS — G89.29 CHRONIC PAIN OF BOTH SHOULDERS: ICD-10-CM

## 2023-08-18 DIAGNOSIS — G89.29 CHRONIC LEFT SHOULDER PAIN: ICD-10-CM

## 2023-08-18 DIAGNOSIS — M25.512 CHRONIC LEFT SHOULDER PAIN: ICD-10-CM

## 2023-08-18 DIAGNOSIS — M25.512 CHRONIC PAIN OF BOTH SHOULDERS: ICD-10-CM

## 2023-08-18 DIAGNOSIS — M25.511 CHRONIC PAIN OF BOTH SHOULDERS: ICD-10-CM

## 2023-08-18 PROCEDURE — 97110 THERAPEUTIC EXERCISES: CPT | Mod: PN

## 2023-08-18 PROCEDURE — 97166 OT EVAL MOD COMPLEX 45 MIN: CPT | Mod: PN

## 2023-08-18 NOTE — PLAN OF CARE
[facility]  Occupational Therapy Initial Evaluation      Name: Rosetta Toro  [facility] Number: [id number]    Therapy Diagnosis: Chronic left shoulder pain  Physician: Irvin Gonzalez,*    Physician Orders: Eval and Treat  Medical Diagnosis: Chronic left shoulder pain  Surgical Procedure and Date: None   Evaluation Date: 8/18/2023  Insurance Authorization Period Expiration: 12/31/23  Plan of Care Certification Period: 09/29/23  Date of Return to MD: 9/18/23  Visit # / Visits authorized: 1 / 12  FOTO: 1/ 3    Precautions:  Standard    Time In: 11:00 AM  Time Out: 12:01 PM  Total Billable Time: 61 minutes    Subjective      Date of Onset: February 2023    History of Current Condition/Mechanism of Injury: Rosetta reports: Patient reports bilateral shoulder pain for about 8 years, but pain in left shoulder worsened in February 2023. Patient received injection in left shoulder on 8/7/23, which patient reports did not relieve her pain. She exercises regularly including daily 2 mile runs. Patient previously ran carrying 2# weights, but cannot at this time due to pain in left shoulder. Patient also reports that she sleeps with bilateral shoulders abducted and externally rotated behind her head.    Falls: None    Involved Side: Left  Dominant Side: Right    Mechanism of Injury: patient believes she strained her left shoulder when reaching behind her back.  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     Current Functional Status   Home/Living environment: lives with their spouse    - 1 story home, 0 steps to enter    - DME: None      Limitation of Functional Status as follows:   ADLs/IADLs:     - Feeding: patient is independent.    - Bathing: patient has difficulty reaching behind her back.    - Dressing/Grooming: patient must don jackets over affected arm first, and patient requires 's assistance to clasp bra behind her back.    - Home Management: patient is independent.    - Driving: patient is independent.     Leisure: painting    Patient's Goals for Therapy: "reduce the pain"    Past Medical History/Physical Systems Review:   Rosetta Toro  has a past medical history of Chicken pox, Hepatitis A, Hyperlipidemia, Osteoarthritis, and Shingles.    Rosetta Toro  has a past surgical history that includes Myomectomy; eximer laser; and Eye surgery (May 31, 2002).    Rosetta has a current medication list which includes the following prescription(s): cholecalciferol (vitamin d3), estradiol, fluticasone propionate, meloxicam, and mupirocin.    Review of patient's allergies indicates:   Allergen Reactions    Merthiolate (benzalkonium) [benzalkonium chloride]         Objective      Active Range of Motion Measurements: LUE  -Forward flexion: 155  -External rotation: 45  -Internal rotation: Lumbar   -Abduction: 150    Active Range of Motion Measurements: RUE  -Forward flexion: 168  -External rotation: 50  -Internal rotation: T12  -Abduction: 170    Passive Range of Motion Measurements: LUE  -Forward flexion: 170  -Abduction: 170    Passive Range of Motion Measurements: RUE  -Forward flexion: 170  -Abduction: 170    Manual Muscle Test: LUE  -Flexion: 4/5  -Extension: 4/5    Manual Muscle Test: RUE  -Flexion: 5/5  -Extension: 5/5    Intake Outcome Measure for FOTO Shoulder Survey    Therapist " reviewed FOTO scores for Rosetta Toro on 8/18/2023.   FOTO report - see Media section or FOTO account episode details.    Intake Score: 63%       Treatment      Total Treatment time (time-based codes) separate from Evaluation: 35 minutes    Rosetta received the treatments listed below:      therapeutic exercises to develop strength, endurance, ROM, and flexibility including:  Patient performed LUE internal rotation using yellow theraband for 3 sets of 10 repetitions.  Patient performed LUE external rotation using yellow theraband for 3 sets of 10 repetitions.  Patient performed LUE scapular row using yellow theraband for 3 sets of 10 repetitions.  Patient performed LUE shoulder flexion using 2# weights for 3 sets of 10 repetitions.   Patient performed LUE scaption external rotation using 2# weights for 3 sets of 10 repetitions.  Patient performed LUE bicep flexion using 3# weights for 3 sets of 10 repetitions.  Patient performed LUE horizontal extension using 2# weights for 3 sets of 10 repetitions.  Patient performed LUE horizontal abduction internal rotation using 1# weights for 3 sets of 10 repetitions.  Patient performed LUE horizontal abduction external rotation using 1# weights for 3 sets of 10 repetitions.   Patient performed LUE supine straight arm press using 3# weights for 3 sets of repetitions.  Patient performed LUE supine tricep extension using 3# weights for 3 sets of repetitions.  Patient performed LUE supine bench press using 3# weights for 3 sets of repetitions.  Patient performed LUE supine flies (horizontal adduction) using 2# weights for 3 sets of repetitions.  Patient performed LUE supine forward flexion using 2# weights for 3 sets of repetitions.  Patient performed LUE sidelying external rotation using 2# weights for 3 sets of repetitions.    supervised modalities after being cleared for contradictions: IFC Electrical Stimulation:  Rosetta received IFC Electrical Stimulation for pain  control applied to the left shoulder. Pt received stimulation at 100 % scan at a frequency of 7.4 volts for 10 minutes. Rosetta tolderated treatment well without any adverse effects.      Patient Education and Home Exercises      Education provided:   -role of OT, goals for OT, scheduling/cancellations, insurance limitations with patient.  -Additional Education provided: home exercise program     Written Home Exercises Provided: yes.  Exercises were reviewed and Rosetta was able to demonstrate them prior to the end of the session.    Rosetta demonstrated good  understanding of the education provided.     Pt was advised to perform these exercises free of pain, and to stop performing them if pain occurs.    See EMR under Patient Instructions for exercises provided 8/18/2023.    Assessment      Rosetta Toro is a 63 y.o. female referred to outpatient occupational therapy and presents with a medical diagnosis of chronic left shoulder pain.      Patient presents with muscular weakness and limitations in left shoulder range of motion. Patient able to tolerate rotator cuff strengthening protocol. Will continue to progress weighted resistance as patient tolerates. As pain is a primary limiting factor, OT introduced IFC e-stim, which patient expressed immediate pain relief upon use. Will continue use of e-stim following therapeutic exercise.     OT provided education on sleeping positions to limit shoulder impingement as well as home exercise program. Patient verbalized and demonstrated understanding.    Following medical record review it is determined that pt will benefit from occupational therapy services in order to maximize pain free and/or functional use of left shoulder. The following goals were discussed with the patient and patient is in agreement with them as to be addressed in the treatment plan. The patient's rehab potential is Good.     Anticipated barriers to occupational therapy: None    Plan of care  discussed with patient: Yes  Patient's spiritual, cultural and educational needs considered and patient is agreeable to the plan of care and goals as stated below:     Medical Necessity is demonstrated by the following  Occupational Profile/History  Co-morbidities and personal factors that may impact the plan of care [] LOW: Brief chart review  [x] MODERATE: Expanded chart review   [] HIGH: Extensive chart review    Moderate / High Support Documentation: Moderate     Examination  Performance deficits relating to physical, cognitive or psychosocial skills that result in activity limitations and/or participation restrictions  [] LOW: addressing 1-3 Performance deficits  [x] MODERATE: 3-5 Performance deficits  [] HIGH: 5+ Performance deficits (please support below)    Moderate / High Support Documentation:    Physical:  Joint Mobility  Joint Stability  Muscle Power/Strength  Muscle Endurance  Pain    Cognitive:  No Deficits    Psychosocial:    No Deficits     Treatment Options [] LOW: Limited options  [x] MODERATE: Several options  [] HIGH: Multiple options      Decision Making/ Complexity Score: moderate       The following goals were discussed with the patient and patient is in agreement with them as to be addressed in the treatment plan.     Goals:   Patient will increase LUE AROM to WFL at all joints in all planes of movement prior to discharge.  Patient will report no LUE pain when performing functional and therapeutic tasks prior to discharge.  Patient will increase LUE MMT grade to 5/5 at shoulder prior to discharge.  Patient will be compliant with HEP daily prior to discharge.     Plan     Plan of Care Certification: 8/18/2023 to 9/29/23.     Outpatient Occupational Therapy 2 times weekly for 6 weeks to include the following interventions: Manual therapy/joint mobilizations, Modalities for pain management, Therapeutic exercises/activities., and Strengthening.    Sonja Watson, OT    Physician's Signature:  _________________________________________ Date: ________________

## 2023-08-18 NOTE — PROGRESS NOTES
"                                                    Occupational Therapy Daily Note     Name: Rosetta Toro  Clinic Number: [id number]  Diagnosis: Chronic left shoulder pain  Physician: Irvin Gonzalez,*  Precautions: Standard  Visit #: 2 of 12    Time In: 3:30 PM  Time Out: 4:24 PM    Subjective     Pt reports: "My pain is much better today." Patient is compliant in home exercise program.  Response to previous treatment: Good, less pain in LUE  Functional change since last treatment: Less pain in LUE with increased functional use during daily living tasks.    Pain Scale: Rosetta rates pain on a scale of 0-10 to be 0 currently.    Objective     Rosetta received individual therapeutic exercises to develop strength, endurance, ROM, and flexibility including:  Patient performed LUE internal/external rotation using 2# weights for 3 sets of 10 repetitions.  Patient performed LUE horizontal scapular rows using 3# weights for 3 sets of 10 repetitions.  Patient performed LUE shoulder flexion using 3# weights for 3 sets of 10 repetitions.   Patient performed LUE scaption external rotation using 2# weights for 3 sets of 10 repetitions.  Patient performed LUE bicep flexion using 3# weights for 3 sets of 10 repetitions.   Patient performed LUE horizontal extension using 2# weights for 3 sets of 10 repetitions.  Patient performed LUE horizontal abduction internal rotation using 3# weights for 3 sets of 10 repetitions.  Patient performed LUE horizontal abduction external rotation using 1# weights for 3 sets of 10 repetitions.   Patient performed LUE supine straight arm press using 3# weights for 3 sets of repetitions.  Patient performed LUE supine tricep extension using 3# weights for 3 sets of repetitions.  Patient performed LUE supine bench press using 3# weights for 3 sets of repetitions.  Patient performed LUE supine flies (horizontal adduction) using 2# weights for 3 sets of repetitions.  Patient performed LUE " supine forward flexion using 2# weights for 3 sets of repetitions.  Patient performed LUE supine external rotation using 2# weights for 3 sets of repetitions.  Patient performed LUE sidelying external rotation using 2# weights for 3 sets of repetitions.     supervised modalities after being cleared for contradictions: IFC Electrical Stimulation:  Rosetta received IFC Electrical Stimulation for pain control applied to the left shoulder. Pt received stimulation at 100 % scan at a frequency of 6.4 volts for 10 minutes. Rosetta tolderated treatment well without any adverse effects.       Home Exercises Provided: Yes    Pt demo good understanding of the education provided. Rosetta demonstrated good return demonstration of activities.     Education provided re:  Rosetta verbalized good understanding of education provided.   No spiritual or educational barriers to learning provided    Assessment     Patient tolerated treatment well. Pt completing treatment without pain or complication.     Patient reported decreased pain and muscular tightness in LUE following previous therapy session. Patient stated e-stim was helpful for immediate pain relief. She is compliant in home exercise program. Patient demonstrates excellent strength and endurance in therapeutic exercises using weights. Will continue to progress rotator cuff strengthening program as tolerated.    Patient is progressing well toward goals.    Goals:   Patient will increase LUE AROM to WFL at all joints in all planes of movement prior to discharge. In Progress  Patient will report no LUE pain when performing functional and therapeutic tasks prior to discharge. In Progress  Patient will increase LUE MMT grade to 5/5 at shoulder prior to discharge. In Progress  Patient will be compliant with HEP daily prior to discharge. In Progress    Plan     Continue with established Plan of Care towards OT goals.    Therapist: Sonja Watson OT  8/21/23

## 2023-08-18 NOTE — PATIENT INSTRUCTIONS
Patient instructed to perform LUE scapular rows using yellow theraband for 3 sets of 10 repetitions.  Patient instructed to perform LUE adducted internal rotation using yellow theraband for 3 sets of 10 repetitions.  Patient instructed to perform LUE adducted external rotation using yellow theraband for 3 sets of 10 repetitions.

## 2023-08-18 NOTE — PROGRESS NOTES
[facility]  Occupational Therapy Initial Evaluation      Name: Rosetta Toro  [facility] Number: [id number]    Therapy Diagnosis: Chronic left shoulder pain  Physician: Irvin Gonzalez,*    Physician Orders: Eval and Treat  Medical Diagnosis: Chronic left shoulder pain  Surgical Procedure and Date: None   Evaluation Date: 8/18/2023  Insurance Authorization Period Expiration: 12/31/23  Plan of Care Certification Period: 09/29/23  Date of Return to MD: ***  Visit # / Visits authorized: *** / ***  FOTO: ***/ 3    Precautions:  Standard    Time In: ***  Time Out: ***  Total Billable Time: *** minutes    Subjective      Date of Onset: ***    History of Current Condition/Mechanism of Injury: Rosetta reports: ***    Falls: ***    Involved Side: Left  Dominant Side: { hand dominance:6448271091}    Mechanism of Injury: ***  Surgical Procedure: None  Imaging: X-ray findings left shoulder 08/07/23: Mild glenohumeral and AC joint degenerative osteoarthritis    Prior Therapy: ***    Pain:  Functional Pain Scale Rating 0-10:   {NUMBERS; 0-10:5044}/10 on average  {NUMBERS; 0-10:5044}/10 at best  {NUMBERS; 0-10:5044}/10 at worst  Location: ***  Description: {Pain Description:45851}  Aggravating Factors: {Causes; Pain:75780}  Easing Factors: {Pain (activities that relieve):20109}    Occupation:  ***  Working presently: {Work history:38422}  Duties: ***    Functional Limitations/Social History:    Previous functional status includes: Independent with all ADLs. ***    Current Functional Status   Home/Living environment: {LIVES WITH:62167}    - *** story home, *** steps to enter    - DME: ***      Limitation of Functional Status as follows:   ADLs/IADLs:     - Feeding: ***    - Bathing: ***    - Dressing/Grooming: ***    - Home Management: ***    - Driving: ***     Leisure: {AMB OT HAND LEISURE:99270}    Patient's Goals for Therapy: ***    Past Medical History/Physical Systems Review:   Rosetta  Muna  has a past medical history of Chicken pox, Hepatitis A, Hyperlipidemia, Osteoarthritis, and Shingles.    Rosetta Toro  has a past surgical history that includes Myomectomy; eximer laser; and Eye surgery (May 31, 2002).    Rosetta has a current medication list which includes the following prescription(s): cholecalciferol (vitamin d3), estradiol, fluticasone propionate, meloxicam, and mupirocin.    Review of patient's allergies indicates:   Allergen Reactions    Merthiolate (benzalkonium) [benzalkonium chloride]         Objective      Active Range of Motion Measurements: LUE  -Forward flexion:   -External rotation:  -Internal rotation:  -Abduction:    Active Range of Motion Measurements: RUE  -Forward flexion:   -External rotation:  -Internal rotation:  -Abduction:    Passive Range of Motion Measurements: LUE  -Forward flexion:   -Abduction:    Passive Range of Motion Measurements: RUE  -Forward flexion:   -Abduction:    Manual Muscle Test: LUE  -Flexion:  -Extension:    Manual Muscle Test: RUE  -Flexion:   -Extension:     Intake Outcome Measure for FOTO *** Survey    Therapist reviewed FOTO scores for Rosetta Toro on 8/18/2023.   FOTO report - see Media section or FOTO account episode details.    Intake Score: ***%       Treatment      Total Treatment time (time-based codes) separate from Evaluation: *** minutes    Rosetta received the treatments listed below:      therapeutic exercises to develop {AMB PT PROGRESS OBJECTIVE:96078} for *** minutes including:  ***    manual therapy techniques: {AMB PT PROGRESS MANUAL THERAPY:78166} were applied to the: *** for *** minutes, including:  ***    neuromuscular re-education activities to improve: {AMB PT PROGRESS NEURO RE-ED:73048} for *** minutes. The following activities were included:  ***    therapeutic activities to improve functional performance for ***  minutes, including:  ***    gait training to improve functional mobility and safety for ***   "minutes, including:  ***    direct contact modalities after being cleared for contraindications: {AMB PT PROGRESS DIRECT CONTACT MODES:19153}    supervised modalities after being cleared for contradictions: {AMB PT SUPERVISED MODES:37278}    hot pack for *** minutes to ***.    cold pack for *** minutes to ***.    Patient Education and Home Exercises      Education provided:   -role of OT, goals for OT, scheduling/cancellations, insurance limitations with patient.  -Additional Education provided: ***    Written Home Exercises Provided: {Blank single:86193::"yes","Patient instructed to cont prior HEP"}.  Exercises were reviewed and Rosetta was able to demonstrate them prior to the end of the session.    Rosetta demonstrated {Desc; good/fair/poor:38376} understanding of the education provided.     Pt was advised to perform these exercises free of pain, and to stop performing them if pain occurs.    See EMR under {Blank single:61484::"Media","Patient Instructions"} for exercises provided {Blank single:71878::"8/18/2023","prior visit"}.    Assessment      Rosetta Toro is a 63 y.o. female referred to outpatient occupational therapy and presents with a medical diagnosis of chronic left shoulder pain.    Following medical record review it is determined that pt will benefit from occupational therapy services in order to maximize pain free and/or functional use of left shoulder. The following goals were discussed with the patient and patient is in agreement with them as to be addressed in the treatment plan. The patient's rehab potential is Good.     Anticipated barriers to occupational therapy: None    Plan of care discussed with patient: Yes  Patient's spiritual, cultural and educational needs considered and patient is agreeable to the plan of care and goals as stated below:     Medical Necessity is demonstrated by the following  Occupational Profile/History  Co-morbidities and personal factors that may impact the " plan of care [] LOW: Brief chart review  [] MODERATE: Expanded chart review   [] HIGH: Extensive chart review    Moderate / High Support Documentation: ***     Examination  Performance deficits relating to physical, cognitive or psychosocial skills that result in activity limitations and/or participation restrictions  [] LOW: addressing 1-3 Performance deficits  [] MODERATE: 3-5 Performance deficits  [] HIGH: 5+ Performance deficits (please support below)    Moderate / High Support Documentation:    Physical:  {Physical:19584}    Cognitive:  {Cognitive:82181}    Psychosocial:    {Psychosocial:24077}     Treatment Options [] LOW: Limited options  [] MODERATE: Several options  [] HIGH: Multiple options      Decision Making/ Complexity Score: {Desc; low/moderate/high:653596}       The following goals were discussed with the patient and patient is in agreement with them as to be addressed in the treatment plan.     Goals:   Patient will increase LUE AROM to WFL at all joints in all planes of movement prior to discharge.  Patient will report no LUE pain when performing functional and therapeutic tasks prior to discharge.  Patient will increase LUE MMT grade to *** at shoulder prior to discharge.  Patient will be compliant with HEP daily prior to discharge.     Plan     Plan of Care Certification: 8/18/2023 to 9/29/23.     Outpatient Occupational Therapy 2 times weekly for 6 weeks to include the following interventions: Manual therapy/joint mobilizations, Modalities for pain management, Therapeutic exercises/activities., and Strengthening.    Sonja Watson OT    Physician's Signature: _________________________________________ Date: ________________

## 2023-08-21 ENCOUNTER — CLINICAL SUPPORT (OUTPATIENT)
Dept: REHABILITATION | Facility: HOSPITAL | Age: 63
End: 2023-08-21
Payer: COMMERCIAL

## 2023-08-21 DIAGNOSIS — M25.512 CHRONIC LEFT SHOULDER PAIN: Primary | ICD-10-CM

## 2023-08-21 DIAGNOSIS — G89.29 CHRONIC LEFT SHOULDER PAIN: Primary | ICD-10-CM

## 2023-08-21 PROCEDURE — 97110 THERAPEUTIC EXERCISES: CPT | Mod: PN

## 2023-08-23 NOTE — PROGRESS NOTES
"                                                    Occupational Therapy Daily Note     Name: Rosetta Toro  Clinic Number: [id number]  Diagnosis: Chronic left shoulder pain  Physician: Irvin Gonzalez,*  Precautions: Standard  Visit #: 3 of 12    Time In: 1:30 PM  Time Out: 2:23 PM  Total Billable Time: 53 minutes     Subjective     Pt reports: "I was using my (affected arm) a lot, and I didn't even realize it because I had no pain. I used to protect it and not use it because of pain."  Response to previous treatment: Good; decreased pain. Patient is compliant with home exercise program.  Functional change since last treatment: Increased use of affected extremity in functional daily living tasks such as organizing boxes for [place]    Pain Scale: Rosetta rates pain on a scale of 0-10 to be 4 currently.    Objective     Rosetta received individual therapeutic exercises to develop strength, endurance, ROM, and flexibility for 43 minutes including:  Patient performed LUE internal/external rotation using 3# weights for 3 sets of 10 repetitions.  Patient performed LUE horizontal scapular rows using 4# weights for 3 sets of 10 repetitions.  Patient performed LUE shoulder flexion using 3# weights for 3 sets of 10 repetitions.   Patient performed LUE scaption external rotation using 2# weights for 3 sets of 10 repetitions.  Patient performed LUE bicep flexion using 4# weights for 3 sets of 10 repetitions.   Patient performed LUE horizontal extension using 3# weights for 3 sets of 10 repetitions.  Patient performed LUE horizontal abduction internal rotation using 4# weights for 3 sets of 10 repetitions.  Patient performed LUE horizontal abduction external rotation using 3# weights for 3 sets of 10 repetitions.   Patient performed LUE supine straight arm press using 4# weights for 3 sets of repetitions.  Patient performed LUE supine tricep extension using 3# weights for 3 sets of repetitions.  Patient " performed LUE supine bench press using 4# weights for 3 sets of repetitions.  Patient performed LUE supine flies (horizontal adduction) using 3# weights for 3 sets of repetitions.  Patient performed LUE supine forward flexion using 2# weights for 3 sets of repetitions.  Patient performed LUE supine external rotation using 3# weights for 3 sets of repetitions.  Patient performed LUE sidelying external rotation using 3# weights for 3 sets of repetitions.  Patient performed LUE behind the back internal rotation stretch using towel for 1 sets of 10 repetitions with 5 second holds.     supervised modalities after being cleared for contradictions: patient reports no history of pacemaker or cancer. IFC Electrical Stimulation: Rosetta received IFC Electrical Stimulation for pain control applied to the left shoulder. Pt received stimulation at 100 % scan at a frequency of 6.4 volts for 10 minutes. Rosetta tolderated treatment well without any adverse effects.       Home Exercises Provided: Yes    Pt demo good understanding of the education provided. OT added behind the back internal rotation towel stretch for 1 set of 10 repetitions with 5 second holds. Patient returned demonstration of exercise. OT provided handout and instructed exercise listed under patient instructions.    Education provided re:  Rosetta verbalized good understanding of education provided.   No spiritual or educational barriers to learning provided    Assessment     Patient tolerated treatment well. Pt completing treatment without pain or complication. Patient achieved goal of increasing left upper extremity range of motion to be within functional limits. Patient is progressing well as she reported she can now use her affected arm in functional activities with no pain. Patient reported she has mild pain and difficulty reaching to wash behind her back while showing using affected arm. OT introduced behind the back internal rotation stretch using towel,  and patient returned demonstration. OT instructed to add this exercise to home exercise program to increase flexibility in internal rotation. Will follow up.    Patient tolerates therapeutic exercises with increased weighted resistance as evidenced good form and muscular endurance throughout session. Patient demonstrates greatest difficulty with supine forward flexion and triceps extensions. Will continue to progress strengthening as patient tolerates.    Patient is progressing well toward goals.    Goals:   Patient will increase LUE AROM to WFL at all joints in all planes of movement prior to discharge. GOAL MET  Patient will report no LUE pain when performing functional and therapeutic tasks prior to discharge. In Progress  Patient will increase LUE MMT grade to 5/5 at shoulder prior to discharge. In Progress  Patient will be compliant with HEP daily prior to discharge. In Progress    Plan     Continue with established Plan of Care towards OT goals.    Therapist: Sonja Watson OT  8/24/23

## 2023-08-24 ENCOUNTER — CLINICAL SUPPORT (OUTPATIENT)
Dept: REHABILITATION | Facility: HOSPITAL | Age: 63
End: 2023-08-24
Payer: COMMERCIAL

## 2023-08-24 DIAGNOSIS — G89.29 CHRONIC LEFT SHOULDER PAIN: Primary | ICD-10-CM

## 2023-08-24 DIAGNOSIS — M25.512 CHRONIC LEFT SHOULDER PAIN: Primary | ICD-10-CM

## 2023-08-24 PROCEDURE — 97110 THERAPEUTIC EXERCISES: CPT | Mod: PN

## 2023-08-24 NOTE — PATIENT INSTRUCTIONS
Patient instructed to complete behind the back internal rotation stretch using towel for 5 second hold for 1 set of 10 repetitions. OT provided handout, and patient demonstrated understanding.

## 2023-08-25 NOTE — PROGRESS NOTES
"                                                    Occupational Therapy Daily Note     Name: Rosetta Toro  Clinic Number: [id number]  Diagnosis: Chronic left shoulder pain  Physician: Irvin Gonzalez,*  Precautions: Standard  Visit #: 4 of 12    Time In: 10:57 AM   Time Out: 11:50 AM  Total Billable Time: 53 minutes     Subjective     Pt reports: "My pain has increased with that behind the back exercise (behind the back internal rotation towel stretch)."  Response to previous treatment: Good; patient is compliant in home exercise program.   Functional change since last treatment: "I have more flexibility in this movement (abduction). I can pick stuff up easier."     Pain Scale: Rosetta rates pain on a scale of 0-10 to be 4 currently.  Pain Location: left shoulder     Objective     Rosetta received individual therapeutic exercises to develop strength, endurance, ROM, and flexibility for 43 minutes including:  Patient performed LUE internal/external rotation using 3# weights for 3 sets of 10 repetitions.  Patient performed LUE horizontal scapular rows using 4# weights for 3 sets of 10 repetitions.  Patient performed LUE shoulder flexion using 3# weights for 3 sets of 10 repetitions.   Patient performed LUE scaption external rotation using 2# weights for 3 sets of 10 repetitions.  Patient performed LUE bicep flexion using 4# weights for 3 sets of 10 repetitions.   Patient performed LUE horizontal extension using 3# weights for 3 sets of 10 repetitions.  Patient performed LUE horizontal abduction internal rotation using 4# weights for 3 sets of 10 repetitions.  Patient performed LUE horizontal abduction external rotation using 3# weights for 3 sets of 10 repetitions.   Patient performed LUE supine straight arm press using 4# weights for 3 sets of repetitions.  Patient performed LUE supine tricep extension using 3# weights for 3 sets of repetitions.  Patient performed LUE supine bench press using 4# weights " for 3 sets of repetitions.  Patient performed LUE supine flies (horizontal adduction) using 3# weights for 3 sets of repetitions.  Patient performed LUE supine forward flexion using 2# weights for 3 sets of repetitions.  Patient performed LUE supine scaption external rotation using 2# weights for 3 sets of repetitions.  Patient performed LUE supine internal/external rotation using 3# weights for 3 sets of repetitions.  Patient performed LUE sidelying external rotation using 3# weights for 3 sets of repetitions.  Patient performed LUE behind the back internal rotation stretch using towel for 1 sets of 10 repetitions with 5 second holds.     supervised modalities for 10 minutes after being cleared for contradictions: patient reports no history of pacemaker or cancer. IFC Electrical Stimulation: Rosetta received IFC Electrical Stimulation for pain control applied to the left shoulder. Pt received stimulation at 100 % scan at a frequency of 7.0 volts for 10 minutes. Rosetta tolderated treatment well without any adverse effects.       Home Exercises Provided: Yes    Pt demo good understanding of the education provided.     Education provided re:  Rosetta verbalized good understanding of education provided.   No spiritual or educational barriers to learning provided    Assessment     Patient tolerated treatment well. Pt completing treatment without pain or complication.     Patient reported increased left shoulder pain on this date due to behind the back internal rotation towel stretch. OT reviewed stretch with patient, and patient demonstrates improved flexibility in left behind the back internal rotation. OT believes pain may related to muscle soreness from increased stretching into left internal rotation. Will follow up next session to reassess pain. Patient noted improvements in abduction during functional activities.    OT did not progress weights for therapeutic exercise on this date as patient reported increased  pain prior to therapeutic exercise. Patient tolerated therapeutic exercises with good form and muscular endurance throughout session. Patient noted decreased strain during left triceps extension exercise on this date, but continues to experience mild difficulty with muscular endurance during supine left forward flexion. Will continue to progress strengthening as patient tolerates.    Patient is progressing well toward goals.    Goals:   Patient will increase LUE AROM to WFL at all joints in all planes of movement prior to discharge. GOAL MET  Patient will report no LUE pain when performing functional and therapeutic tasks prior to discharge. In Progress  Patient will increase LUE MMT grade to 5/5 at shoulder prior to discharge. In Progress  Patient will be compliant with HEP daily prior to discharge. In Progress    Plan     Continue with established Plan of Care towards OT goals.    Therapist: Sonja Watson OT  8/28/23

## 2023-08-28 ENCOUNTER — CLINICAL SUPPORT (OUTPATIENT)
Dept: REHABILITATION | Facility: HOSPITAL | Age: 63
End: 2023-08-28
Payer: COMMERCIAL

## 2023-08-28 DIAGNOSIS — M25.512 CHRONIC LEFT SHOULDER PAIN: Primary | ICD-10-CM

## 2023-08-28 DIAGNOSIS — G89.29 CHRONIC LEFT SHOULDER PAIN: Primary | ICD-10-CM

## 2023-08-28 PROCEDURE — 97110 THERAPEUTIC EXERCISES: CPT | Mod: PN

## 2023-08-28 PROCEDURE — 97014 ELECTRIC STIMULATION THERAPY: CPT | Mod: PN

## 2023-08-31 NOTE — PROGRESS NOTES
"                                                    Occupational Therapy Daily Note     Name: Rosetta Toro  Clinic Number: [id number]  Diagnosis: Chronic left shoulder pain  Physician: Irvin Gonzalez,*  Precautions: Standard  Visit #: 5 of 12    Time In: 10:01 AM  Time Out: 10:57 AM  Total Billable Time: 56 minutes     Subjective     Pt reports: "I was working very hard the past two days cleaning my house. I felt no pain. I can reach my hand up my back now."  Response to previous treatment: Good; patient is compliant in home exercise program.   Functional change since last treatment: patient now able to reach T12 in internal rotation.    Pain Scale: Rosetta rates pain on a scale of 0-10 to be 3 currently.  Pain Location: left shoulder     Objective     Rosetta received individual therapeutic exercises to develop strength, endurance, ROM, and flexibility for 46 minutes including:  Patient performed LUE internal/external rotation using 4# weights for 3 sets of 10 repetitions.  Patient performed LUE horizontal scapular rows using 5# weights for 3 sets of 10 repetitions.  Patient performed LUE shoulder flexion using 4# weights for 3 sets of 10 repetitions.   Patient performed LUE scaption external rotation using 4# weights for 3 sets of 10 repetitions.  Patient performed LUE bicep flexion using 5# weights for 3 sets of 10 repetitions.   Patient performed LUE horizontal extension using 4# weights for 3 sets of 10 repetitions.  Patient performed LUE horizontal abduction internal rotation using 5# weights for 3 sets of 10 repetitions.  Patient performed LUE tricep kick-backs using 4# weights for 3 sets of 10 repetitions.  Patient performed LUE horizontal abduction external rotation using 4# weights for 3 sets of 10 repetitions.   Patient performed LUE supine straight arm press using 5# weights for 3 sets of repetitions.  Patient performed LUE supine tricep extension using 4# weights for 3 sets of " repetitions.  Patient performed LUE supine bench press using 5# weights for 3 sets of repetitions.  Patient performed LUE supine flies (horizontal adduction) using 4# weights for 3 sets of repetitions.  Patient performed LUE supine forward flexion using 3# weights for 3 sets of repetitions.  Patient performed LUE supine scaption external rotation using 3# weights for 3 sets of repetitions.  Patient performed LUE supine internal/external rotation using 4# weights for 3 sets of repetitions.  Patient performed LUE sidelying external rotation using 4# weights for 3 sets of repetitions.  Patient performed LUE behind the back internal rotation stretch using towel for 1 sets of 10 repetitions with 5 second holds.     supervised modalities for 10 minutes after being cleared for contradictions: patient reports no history of pacemaker or cancer. IFC Electrical Stimulation: Rosetta received IFC Electrical Stimulation for pain control applied to the left shoulder. Pt received stimulation at 100 % scan at a frequency of 7.0 volts for 10 minutes. Rosetta tolderated treatment well without any adverse effects.       Home Exercises Provided: Yes    Pt demo good understanding of the education provided.     Education provided re:  Rosetta verbalized good understanding of education provided.   No spiritual or educational barriers to learning provided    Assessment     Patient tolerated treatment well. Pt completing treatment without pain or complication.     Patient demonstrated improved internal rotation to T12 following introduction of internal rotation stretch at last session. Patient also noted improved ability to pronate hand without pain during internal rotation. Patient is compliant in home exercise program and is progressing in functional range of motion.     Patient tolerated increased weight by 1# in all therapeutic exercises. Patient performed supine forward flexion and scaption external rotation with good form using  increased weight. Will continue to progress as patient gains strength.    Patient states no pain during functional movements, including heavy work while cleaning her house.  Patient expressed gratitude to therapist for helping to relieve shoulder pain through therapy exercises. Patient is progressing well to achieve goals.      Goals:   Patient will increase LUE AROM to WFL at all joints in all planes of movement prior to discharge. GOAL MET  Patient will report no LUE pain when performing functional and therapeutic tasks prior to discharge. GOAL MET  Patient will increase LUE MMT grade to 5/5 at shoulder prior to discharge. In Progress  Patient will be compliant with HEP daily prior to discharge. In Progress    Plan     Continue with established Plan of Care towards OT goals.    Therapist: Sonja Watson OT  9/1/23

## 2023-09-01 ENCOUNTER — CLINICAL SUPPORT (OUTPATIENT)
Dept: REHABILITATION | Facility: HOSPITAL | Age: 63
End: 2023-09-01
Payer: COMMERCIAL

## 2023-09-01 DIAGNOSIS — M25.512 CHRONIC LEFT SHOULDER PAIN: Primary | ICD-10-CM

## 2023-09-01 DIAGNOSIS — G89.29 CHRONIC LEFT SHOULDER PAIN: Primary | ICD-10-CM

## 2023-09-01 PROCEDURE — 97110 THERAPEUTIC EXERCISES: CPT | Mod: PN

## 2023-09-01 PROCEDURE — 97014 ELECTRIC STIMULATION THERAPY: CPT | Mod: PN

## 2023-09-05 ENCOUNTER — CLINICAL SUPPORT (OUTPATIENT)
Dept: REHABILITATION | Facility: HOSPITAL | Age: 63
End: 2023-09-05
Payer: COMMERCIAL

## 2023-09-05 DIAGNOSIS — G89.29 CHRONIC LEFT SHOULDER PAIN: Primary | ICD-10-CM

## 2023-09-05 DIAGNOSIS — M25.512 CHRONIC LEFT SHOULDER PAIN: Primary | ICD-10-CM

## 2023-09-05 PROCEDURE — 97110 THERAPEUTIC EXERCISES: CPT | Mod: PN

## 2023-09-05 PROCEDURE — 97014 ELECTRIC STIMULATION THERAPY: CPT | Mod: PN

## 2023-09-05 NOTE — PROGRESS NOTES
"                                                    Occupational Therapy Daily Note     Name: Rosetta Toro  Clinic Number: [id number]  Diagnosis: Chronic left shoulder pain  Physician: Irvin Gonzalez,*  Precautions: Standard  Visit #: 6 of 12    Time In: 10:53 AM  Time Out: 11:54 AM  Total Billable Time: 61 minutes     Subjective     Pt reports: "Yesterday I did too much work. I tried to do the behind the back stretch, and it hurt a little more."  Response to previous treatment: Good; patient is compliant in home exercise program.   Functional change since last treatment: "I can put my jacket on."    Pain Scale: Rosetta rates pain on a scale of 0-10 to be 0 currently.  Pain Location: left shoulder     Objective     Measured at evaluation 8/18/23:  Active Range of Motion Measurements: LUE  -Forward flexion: 155  -External rotation: 45  -Internal rotation: Lumbar   -Abduction: 150    Measured on 9/5/23:  Active Range of Motion Measurements: LUE  -Forward flexion: 155  -External rotation: 60  -Internal rotation: T12  -Abduction: 160    FOTO score on 9/5/23: 77    Rosetta received individual therapeutic exercises to develop strength, endurance, ROM, and flexibility for 51 minutes including:  Patient performed LUE internal/external rotation using 4# weights for 3 sets of 10 repetitions.  Patient performed LUE horizontal scapular rows using 5# weights for 3 sets of 10 repetitions.  Patient performed LUE shoulder flexion using 4# weights for 3 sets of 10 repetitions.   Patient performed LUE scaption external rotation using 4# weights for 3 sets of 10 repetitions.  Patient performed LUE bicep flexion using 5# weights for 3 sets of 10 repetitions.   Patient performed LUE horizontal extension using 4# weights for 3 sets of 10 repetitions.  Patient performed LUE horizontal abduction internal rotation using 5# weights for 3 sets of 10 repetitions.  Patient performed LUE tricep kick-backs using 4# weights for 3 sets " of 10 repetitions.  Patient performed LUE horizontal abduction external rotation using 4# weights for 3 sets of 10 repetitions.   Patient performed LUE supine straight arm press using 5# weights for 3 sets of repetitions.  Patient performed LUE supine tricep extension using 4# weights for 3 sets of repetitions.  Patient performed LUE supine bench press using 5# weights for 3 sets of repetitions.  Patient performed LUE supine flies (horizontal adduction) using 4# weights for 3 sets of repetitions.  Patient performed LUE supine forward flexion using 4# weights for 3 sets of repetitions.  Patient performed LUE supine scaption external rotation using 4# weights for 3 sets of repetitions.  Patient performed LUE supine internal/external rotation using 4# weights for 3 sets of repetitions.  Patient performed LUE sidelying external rotation using 4# weights for 3 sets of repetitions.  Patient performed LUE behind the back internal rotation stretch using towel for 1 sets of 10 repetitions with 5 second holds.     supervised modalities for 10 minutes after being cleared for contradictions: patient reports no history of pacemaker or cancer. IFC Electrical Stimulation: Rosetta received IFC Electrical Stimulation for pain control applied to the left shoulder. Pt received stimulation at 100 % scan at a frequency of 9.6 volts for 10 minutes. Rosetta tolderated treatment well without any adverse effects.       Home Exercises Provided: Yes    Pt demo good understanding of the education provided.     Education provided re:  Rosetta verbalized good understanding of education provided.   No spiritual or educational barriers to learning provided    Assessment     Patient tolerated treatment well. Pt completing treatment without pain or complication.     Upon reassessment, patient's FOTO score 14% which indicates greater engagement in meaningful occupations. Patient also increased in active range of motion measurements of left  shoulder abduction by 10 degrees, external rotation by 15 degrees, and internal rotation to T12 since evaluation. Patient also reports feeling stronger and experiencing less pain in daily living tasks. Patient occasionally experiences pain up to level 2 on a scale of 0-10 during sudden movements, but generally experiences no pain during typical activities.     Patient continues to demonstrate good form and muscular endurance throughout therapeutic exercises. Progressed to 4# in supine forward flexion and supine scaption external rotation with good form. Will continue to progress weights as patient progresses.    Patient is compliant in home exercise program. OT provided green and blue theraband for patient to use in home exercise program as she can tolerate increased resistance.     Goals:   Patient will increase LUE AROM to WFL at all joints in all planes of movement prior to discharge. GOAL MET  Patient will report no LUE pain when performing functional and therapeutic tasks prior to discharge. GOAL MET  Patient will increase LUE MMT grade to 5/5 at shoulder prior to discharge. In Progress  Patient will be compliant with HEP daily prior to discharge. In Progress    Plan     Continue with established Plan of Care towards OT goals.    Therapist: Sonja Watson OT  9/5/23

## 2023-09-06 NOTE — PROGRESS NOTES
"                                                    Occupational Therapy Daily Note     Name: Rosetta Toro  Clinic Number: [id number]  Diagnosis: Chronic left shoulder pain  Physician: Irvin Gonzalez,*  Precautions: Standard  Visit #: 7 of 12    Time In: 12:30 PM  Time Out: 1:23 PM  Total Billable Time: 53 minutes     Subjective     Pt reports: "I feel good; I have no pain."  Response to previous treatment: Good; patient is compliant in home exercise program.   Functional change since last treatment: patient reports no changes since last session.     Pain Scale: Rosetta rates pain on a scale of 0-10 to be 0 currently.  Pain Location: left shoulder     Objective     Rosetta received individual therapeutic exercises to develop strength, endurance, ROM, and flexibility for 43 minutes including:  Patient performed LUE internal/external rotation using 4# weights for 3 sets of 10 repetitions.  Patient performed LUE horizontal scapular rows using 5# weights for 3 sets of 10 repetitions.  Patient performed LUE shoulder flexion using 4# weights for 3 sets of 10 repetitions.   Patient performed LUE scaption external rotation using 4# weights for 3 sets of 10 repetitions.  Patient performed LUE bicep flexion using 5# weights for 3 sets of 10 repetitions.   Patient performed LUE horizontal extension using 4# weights for 3 sets of 10 repetitions.  Patient performed LUE horizontal abduction internal rotation using 5# weights for 3 sets of 10 repetitions.  Patient performed LUE tricep kick-backs using 4# weights for 3 sets of 10 repetitions.  Patient performed LUE horizontal abduction external rotation using 4# weights for 3 sets of 10 repetitions.   Patient performed LUE supine straight arm press using 5# weights for 3 sets of repetitions.  Patient performed LUE supine tricep extension using 4# weights for 3 sets of repetitions.  Patient performed LUE supine bench press using 5# weights for 3 sets of " repetitions.  Patient performed LUE supine flies (horizontal adduction) using 4# weights for 3 sets of repetitions.  Patient performed LUE supine forward flexion using 4# weights for 3 sets of repetitions.  Patient performed LUE supine scaption external rotation using 4# weights for 3 sets of repetitions.  Patient performed LUE supine internal/external rotation using 4# weights for 3 sets of repetitions.  Patient performed LUE sidelying external rotation using 4# weights for 3 sets of repetitions.     supervised modalities for 10 minutes after being cleared for contradictions: patient reports no history of pacemaker or cancer. IFC Electrical Stimulation: Rosetta received IFC Electrical Stimulation for pain control applied to the left shoulder. Pt received stimulation at 100 % scan at a frequency of 10.5 volts for 10 minutes. Rosetta tolderated treatment well without any adverse effects.       Home Exercises Provided: Yes    Pt demo good understanding of the education provided.     Education provided re:  Rosetta verbalized good understanding of education provided.   No spiritual or educational barriers to learning provided    Assessment     Patient tolerated treatment well. Pt completing treatment without pain or complication.     Patient reported she is experiencing increased knee pain on this date and questioned whether horizontal therapeutic exercises may be the cause. OT monitored patient's form to ensure joint protection. Patient is an avid runner, which could also be causing increased knee pain. Will continue to monitor.    Patient performed therapeutic exercises with good form. Patient noted she is performing functional activities with no pain in her left shoulder. Will progress strengthening next session.    Patient is progressing well to achieve goals.     Goals:   Patient will increase LUE AROM to WFL at all joints in all planes of movement prior to discharge. GOAL MET  Patient will report no LUE pain  when performing functional and therapeutic tasks prior to discharge. GOAL MET  Patient will increase LUE MMT grade to 5/5 at shoulder prior to discharge. In Progress  Patient will be compliant with HEP daily prior to discharge. In Progress    Plan     Continue with established Plan of Care towards OT goals.    Therapist: Sonja Watson OT  9/7/23

## 2023-09-07 ENCOUNTER — PATIENT MESSAGE (OUTPATIENT)
Dept: FAMILY MEDICINE | Facility: CLINIC | Age: 63
End: 2023-09-07
Payer: COMMERCIAL

## 2023-09-07 ENCOUNTER — CLINICAL SUPPORT (OUTPATIENT)
Dept: REHABILITATION | Facility: HOSPITAL | Age: 63
End: 2023-09-07
Payer: COMMERCIAL

## 2023-09-07 DIAGNOSIS — M25.512 CHRONIC LEFT SHOULDER PAIN: Primary | ICD-10-CM

## 2023-09-07 DIAGNOSIS — G89.29 CHRONIC LEFT SHOULDER PAIN: Primary | ICD-10-CM

## 2023-09-07 PROCEDURE — 97014 ELECTRIC STIMULATION THERAPY: CPT | Mod: PN

## 2023-09-07 PROCEDURE — 97110 THERAPEUTIC EXERCISES: CPT | Mod: PN

## 2023-09-11 NOTE — PROGRESS NOTES
"                                                    Occupational Therapy Daily Note     Name: Rosetta Toro  Clinic Number: [id number]  Diagnosis: Chronic left shoulder pain  Physician: Irvin Gonzalez,*  Precautions: Standard  Insurance Authorization Period Expiration: 12/31/23  Plan of Care Certification Period: 09/29/23  Visit #: 8 of 12    Time In: 2:53 PM  Time Out: 3:50 PM  Total Billable Time: 57 minutes     Subjective     Pt reports: "My pain will go up to a 2 because I have been busy. But I can  heavy things without pain, and I am so happy."  Response to previous treatment: Good; patient is compliant in home exercise program.   Functional change since last treatment: patient able to lift heavy objects at [employer] without pain.     Pain Scale: Rosetta rates pain on a scale of 0-10 to be 0 currently. Patient experiences pain up to a 2 with excessive force.   Pain Location: left shoulder     Objective     Rosetta received individual therapeutic exercises to develop strength, endurance, ROM, and flexibility for 47 minutes including:  Patient performed LUE internal/external rotation using 5# weights for 3 sets of 10 repetitions.  Patient performed LUE horizontal scapular rows using 6# weights for 3 sets of 10 repetitions.  Patient performed LUE shoulder flexion using 5# weights for 3 sets of 10 repetitions.   Patient performed LUE scaption external rotation using 5# weights for 3 sets of 10 repetitions.  Patient performed LUE bicep flexion using 6# weights for 3 sets of 10 repetitions.   Patient performed LUE horizontal extension using 5# weights for 3 sets of 10 repetitions.  Patient performed LUE horizontal abduction internal rotation using 5# weights for 3 sets of 10 repetitions.  Patient performed LUE tricep kick-backs using 5# weights for 3 sets of 10 repetitions.  Patient performed LUE horizontal abduction external rotation using 5# weights for 3 sets of 10 repetitions.   Patient performed " LUE supine straight arm press using 6# weights for 3 sets of repetitions.  Patient performed LUE supine tricep extension using 5# weights for 3 sets of repetitions.  Patient performed LUE supine bench press using 6# weights for 3 sets of repetitions.  Patient performed LUE supine flies (horizontal adduction) using 5# weights for 3 sets of repetitions.  Patient performed LUE supine forward flexion using 5# weights for 3 sets of repetitions.  Patient performed LUE supine scaption external rotation using 5# weights for 3 sets of repetitions.  Patient performed LUE supine internal/external rotation using 5# weights for 3 sets of repetitions.  Patient performed LUE sidelying external rotation using 5# weights for 3 sets of repetitions.     supervised modalities for 10 minutes after being cleared for contradictions: patient reports no history of pacemaker or cancer. IFC Electrical Stimulation: Rosetta received IFC Electrical Stimulation for pain control applied to the left shoulder. Pt received stimulation at 100 % scan at a frequency of 9.6 volts for 10 minutes. Rosetta tolderated treatment well without any adverse effects.       Home Exercises Provided: Yes    Pt demo good understanding of the education provided.     Education provided re:  Rosetta verbalized good understanding of education provided.   No spiritual or educational barriers to learning provided    Assessment     Patient tolerated treatment well. Pt completing treatment without pain or complication.     Patient reported she has been lifting heavy boxes at [employer] today with no pain. Patient continued to state she is very happy with outcomes from therapy. She will meet with her MD on Monday, 9/18 to discuss her progress. OT believes patient will be preparing for discharge with home exercise program soon after.    Patient tolerated increased weighted resistance by 1# in all therapeutic exercises with good form. Patient had good muscular endurance needed to  complete session. Patient stated she had no knee pain on this date, which is an improvement from last session. Patient continued to report good pain relief from e-stim following therapy sessions.    Patient is progressing well to achieve goals.     Goals:   Patient will increase LUE AROM to WFL at all joints in all planes of movement prior to discharge. GOAL MET  Patient will report no LUE pain when performing functional and therapeutic tasks prior to discharge. GOAL MET  Patient will increase LUE MMT grade to 5/5 at shoulder prior to discharge. In Progress  Patient will be compliant with HEP daily prior to discharge. In Progress    Plan     Continue with established Plan of Care towards OT goals.    Therapist: Sonja Watson OT  9/12/23

## 2023-09-12 ENCOUNTER — CLINICAL SUPPORT (OUTPATIENT)
Dept: REHABILITATION | Facility: HOSPITAL | Age: 63
End: 2023-09-12
Payer: COMMERCIAL

## 2023-09-12 DIAGNOSIS — G89.29 CHRONIC LEFT SHOULDER PAIN: Primary | ICD-10-CM

## 2023-09-12 DIAGNOSIS — M25.512 CHRONIC LEFT SHOULDER PAIN: Primary | ICD-10-CM

## 2023-09-12 PROCEDURE — 97014 ELECTRIC STIMULATION THERAPY: CPT | Mod: PN

## 2023-09-12 PROCEDURE — 97110 THERAPEUTIC EXERCISES: CPT | Mod: PN

## 2023-09-14 NOTE — PROGRESS NOTES
"                                                    Occupational Therapy Daily Note     Name: Rosetta Toro  Clinic Number: [id number]  Diagnosis: Chronic left shoulder pain  Physician: Irvin Gonzalez,*  Precautions: Standard  Insurance Authorization Period Expiration: 12/31/23  Plan of Care Certification Period: 09/29/23  Visit #: 9 of 12    Time In: 3:30 PM  Time Out: 4:28 PM  Total Billable Time: 58 minutes     Subjective     Pt reports: "My arm feels wonderful after exercising."  Response to previous treatment: Good; patient is compliant in home exercise program.   Functional change since last treatment: patient able to lift heavy objects at [employer] without pain.     Pain Scale: Rosetta rates pain on a scale of 0-10 to be 0 currently. Patient experiences pain up to a 1 with excessive force.   Pain Location: left shoulder     Objective     Rosetta received individual therapeutic exercises to develop strength, endurance, ROM, and flexibility for 48 minutes including:  Patient performed LUE internal/external rotation using 6# weights for 3 sets of 10 repetitions.  Patient performed LUE horizontal scapular rows using 7# weights for 3 sets of 10 repetitions.  Patient performed LUE shoulder flexion using 6# weights for 3 sets of 10 repetitions.   Patient performed LUE scaption external rotation using 6# weights for 3 sets of 10 repetitions.  Patient performed LUE bicep flexion using 7# weights for 3 sets of 10 repetitions.   Patient performed LUE horizontal extension using 6# weights for 3 sets of 10 repetitions.  Patient performed LUE horizontal abduction internal rotation using 6# weights for 3 sets of 10 repetitions.  Patient performed LUE tricep kick-backs using 6# weights for 3 sets of 10 repetitions.  Patient performed LUE horizontal abduction external rotation using 6# weights for 3 sets of 10 repetitions.   Patient performed LUE supine straight arm press using 7# weights for 3 sets of " repetitions.  Patient performed LUE supine tricep extension using 6# weights for 3 sets of repetitions.  Patient performed LUE supine bench press using 7# weights for 3 sets of repetitions.  Patient performed LUE supine flies (horizontal adduction) using 5# weights for 3 sets of repetitions.  Patient performed LUE supine forward flexion using 6# weights for 3 sets of repetitions.  Patient performed LUE supine scaption external rotation using 6# weights for 3 sets of repetitions.  Patient performed LUE supine internal/external rotation using 5# weights for 3 sets of repetitions.  Patient performed LUE sidelying external rotation using 5# weights for 3 sets of repetitions.     supervised modalities for 10 minutes after being cleared for contradictions: patient reports no history of pacemaker or cancer. IFC Electrical Stimulation: Rosetta received IFC Electrical Stimulation for pain control applied to the left shoulder. Pt received stimulation at 100 % scan at a frequency of 8.6 volts for 10 minutes. Rosetta tolderated treatment well without any adverse effects.       Home Exercises Provided: Yes    Pt demo good understanding of the education provided.     Education provided re:  Rosetta verbalized good understanding of education provided.   No spiritual or educational barriers to learning provided    Assessment     Patient tolerated treatment well. Pt completing treatment without pain or complication.     Patient continued to report feeling happy about her outcomes from therapy. Patient tolerated increased weighted resistance by 1# in most exercises with good form, only requiring occasional rest breaks. Patient remained at previous weights for supine internal/external rotation, sidelying external rotation, and flies to maintain form. Patient is meeting with MD on 9/18; OT anticipates patient will discharge after 12 visits for therapy. Will follow up.     Patient is progressing well to achieve goals.     Goals:    Patient will increase LUE AROM to WFL at all joints in all planes of movement prior to discharge. GOAL MET  Patient will report no LUE pain when performing functional and therapeutic tasks prior to discharge. GOAL MET  Patient will increase LUE MMT grade to 5/5 at shoulder prior to discharge. In Progress  Patient will be compliant with HEP daily prior to discharge. In Progress    Plan     Continue with established Plan of Care towards OT goals.    Therapist: Sonja Watson OT  9/15/23

## 2023-09-15 ENCOUNTER — CLINICAL SUPPORT (OUTPATIENT)
Dept: REHABILITATION | Facility: HOSPITAL | Age: 63
End: 2023-09-15
Payer: COMMERCIAL

## 2023-09-15 DIAGNOSIS — G89.29 CHRONIC LEFT SHOULDER PAIN: Primary | ICD-10-CM

## 2023-09-15 DIAGNOSIS — M25.512 CHRONIC LEFT SHOULDER PAIN: Primary | ICD-10-CM

## 2023-09-15 PROCEDURE — 97014 ELECTRIC STIMULATION THERAPY: CPT | Mod: PN

## 2023-09-15 PROCEDURE — 97110 THERAPEUTIC EXERCISES: CPT | Mod: PN

## 2023-09-18 ENCOUNTER — CLINICAL SUPPORT (OUTPATIENT)
Dept: REHABILITATION | Facility: HOSPITAL | Age: 63
End: 2023-09-18
Payer: COMMERCIAL

## 2023-09-18 ENCOUNTER — OFFICE VISIT (OUTPATIENT)
Dept: ORTHOPEDICS | Facility: CLINIC | Age: 63
End: 2023-09-18
Payer: COMMERCIAL

## 2023-09-18 VITALS — HEIGHT: 62 IN | BODY MASS INDEX: 23.55 KG/M2 | WEIGHT: 128 LBS

## 2023-09-18 DIAGNOSIS — M25.512 CHRONIC LEFT SHOULDER PAIN: Primary | ICD-10-CM

## 2023-09-18 DIAGNOSIS — G89.29 CHRONIC LEFT SHOULDER PAIN: Primary | ICD-10-CM

## 2023-09-18 DIAGNOSIS — M25.512 LEFT SHOULDER PAIN, UNSPECIFIED CHRONICITY: Primary | ICD-10-CM

## 2023-09-18 PROCEDURE — 99999 PR PBB SHADOW E&M-EST. PATIENT-LVL III: ICD-10-PCS | Mod: PBBFAC,,, | Performed by: ORTHOPAEDIC SURGERY

## 2023-09-18 PROCEDURE — 99213 OFFICE O/P EST LOW 20 MIN: CPT | Mod: S$GLB,,, | Performed by: ORTHOPAEDIC SURGERY

## 2023-09-18 PROCEDURE — 99999 PR PBB SHADOW E&M-EST. PATIENT-LVL III: CPT | Mod: PBBFAC,,, | Performed by: ORTHOPAEDIC SURGERY

## 2023-09-18 PROCEDURE — 97110 THERAPEUTIC EXERCISES: CPT | Mod: PN

## 2023-09-18 PROCEDURE — 99213 PR OFFICE/OUTPT VISIT, EST, LEVL III, 20-29 MIN: ICD-10-PCS | Mod: S$GLB,,, | Performed by: ORTHOPAEDIC SURGERY

## 2023-09-18 PROCEDURE — 97014 ELECTRIC STIMULATION THERAPY: CPT | Mod: PN

## 2023-09-18 NOTE — PROGRESS NOTES
Subjective:      Patient ID: Rosetta Toro is a 63 y.o. female.    Chief Complaint: No chief complaint on file.    HPI  Patient follow up on bilateral primarily left shoulder pain.  She is markedly improved with a few therapy sessions as well as her previous injection.  She is still describing intermittent 2/10 level pain.  ROS      Objective:    Ortho Exam      Constitutional:   Patient is alert  and oriented in no acute distress  HEENT:  normocephalic atraumatic; PERRL EOMI  Neck:  Supple without adenopathy  Cardiovascular:  Normal rate and rhythm  Pulmonary:  Normal respiratory effort normal chest wall expansion  Abdominal:  Nonprotuberant nondistended  Musculoskeletal:  Patient has a steady nonantalgic gait.  Neck is supple with adequate range of motion  No Lhermitte's or Spurling sign.  Improved range of motion of the left shoulder with discomfort with full forward flexion and internal rotation.  There is a mildly positive impingement sign.  Negative Yergason's and Speed's testing.  No gross weakness however pain with strength testing against resistance there is no swelling mass or atrophy noted.  There is a normal distal neurologic and vascular examination.  Neurological:  No focal defect; cranial nerves 2-12 grossly intact  Psychiatric/behavioral:  Mood and behavior normal    X-Ray Shoulder Trauma 3 view Left  Narrative: EXAMINATION:  XR SHOULDER TRAUMA 3 VIEW LEFT    CLINICAL HISTORY:  Pain in left shoulder    TECHNIQUE:  Three views of the left shoulder were performed.    COMPARISON  None    FINDINGS:  No acute fracture or dislocation.  No significant soft tissue swelling.    Mild glenohumeral and AC joint degenerative osteoarthrosis.  Impression: Mild degenerative osteoarthrosis.    Electronically signed by: Catrachito Dolan  Date:    08/07/2023  Time:    09:38       My Radiographs Findings:    No new radiographs  Assessment:       Encounter Diagnosis   Name Primary?    Left shoulder pain, unspecified  chronicity Yes         Plan:       I have discussed medical condition and treatment options with her at length.  She is quite pleased with her improvement to date.  We will have her begin to transition to a home rehab program.  NSAIDs as approved by her PCP.  GI cardiac and renal precautions were discussed.  Follow up at this point can be as needed.  Advance activities as tolerated.        Past Medical History:   Diagnosis Date    Chicken pox     Hepatitis A     fatty liver    Hyperlipidemia     Osteoarthritis     Shingles      Past Surgical History:   Procedure Laterality Date    eximer laser      EYE SURGERY  May 31, 2002    Excimer Laser    MYOMECTOMY           Current Outpatient Medications:     cholecalciferol, vitamin D3, 5,000 unit Tab, Take 5,000 Units by mouth once daily., Disp: , Rfl:     estradioL (ESTRACE) 0.01 % (0.1 mg/gram) vaginal cream, Place a pea-sized amount inside vagina every night for 1 week, and then 2-3 times a week., Disp: 42.5 g, Rfl: 6    fluticasone propionate (FLONASE) 50 mcg/actuation nasal spray, 1 spray (50 mcg total) by Each Nostril route once daily., Disp: 18 mL, Rfl: 11    mupirocin (BACTROBAN) 2 % ointment, Apply 1 gram tid, Disp: 30 g, Rfl: 0    meloxicam (MOBIC) 7.5 MG tablet, Take 1 tablet (7.5 mg total) by mouth once daily. (Patient not taking: Reported on 9/18/2023), Disp: 30 tablet, Rfl: 1    Review of patient's allergies indicates:   Allergen Reactions    Meloxicam Hives    Merthiolate (benzalkonium) [benzalkonium chloride]        Family History   Problem Relation Age of Onset    Cancer Mother     Stomach cancer Mother     Breast cancer Neg Hx     Ovarian cancer Neg Hx      Social History     Occupational History    Not on file   Tobacco Use    Smoking status: Former     Current packs/day: 0.00     Types: Cigarettes    Smokeless tobacco: Former   Substance and Sexual Activity    Alcohol use: No    Drug use: No    Sexual activity: Never

## 2023-09-18 NOTE — PROGRESS NOTES
"                                                    Occupational Therapy Daily Note     Name: Rosetta Toro  Clinic Number: [id number]  Diagnosis: Chronic left shoulder pain  Physician: Irvin Gonzalez,*  Precautions: Standard  Insurance Authorization Period Expiration: 12/31/23  Plan of Care Certification Period: 09/29/23  Visit #: 10 of 12    Time In: 1:32 PM  Time Out: 2:26 PM  Total Billable Time: 54 minutes     Subjective     Pt reports: "My doctor says my pain is from both arms bursitis inflammation, and I need to take an ant-inflammatory medication to prevent pain. I am taking ibuprofen, but my mobility is good."  Response to previous treatment: Good; patient is compliant in home exercise program.   Functional change since last treatment: increased functional engagement in tasks around the home and [employer] without pain until exertion of force    Pain Scale: Rosetta rates pain on a scale of 0-10 to be 0 currently. Patient experiences pain up to a 2 with excessive force.   Pain Location: left shoulder     Objective     Rosetta received individual therapeutic exercises to develop strength, endurance, ROM, and flexibility for 44 minutes including:  Patient performed LUE internal/external rotation using 6# weights for 3 sets of 10 repetitions.  Patient performed LUE horizontal scapular rows using 7# weights for 3 sets of 10 repetitions.  Patient performed LUE shoulder flexion using 6# weights for 3 sets of 10 repetitions.   Patient performed LUE scaption external rotation using 6# weights for 3 sets of 10 repetitions.  Patient performed LUE bicep flexion using 7# weights for 3 sets of 10 repetitions.   Patient performed LUE horizontal extension using 6# weights for 3 sets of 10 repetitions.  Patient performed LUE horizontal abduction internal rotation using 6# weights for 3 sets of 10 repetitions.  Patient performed LUE tricep kick-backs using 6# weights for 3 sets of 10 repetitions.  Patient performed " LUE horizontal abduction external rotation using 6# weights for 3 sets of 10 repetitions.   Patient performed LUE supine straight arm press using 7# weights for 3 sets of repetitions.  Patient performed LUE supine tricep extension using 6# weights for 3 sets of repetitions.  Patient performed LUE supine bench press using 7# weights for 3 sets of repetitions.  Patient performed LUE supine flies (horizontal adduction) using 6# weights for 3 sets of repetitions.  Patient performed LUE supine forward flexion using 6# weights for 3 sets of repetitions.  Patient performed LUE supine scaption external rotation using 6# weights for 3 sets of repetitions.  Patient performed LUE supine internal/external rotation using 6# weights for 3 sets of repetitions.  Patient performed LUE sidelying external rotation using 6# weights for 3 sets of repetitions.     supervised modalities for 10 minutes after being cleared for contradictions: patient reports no history of pacemaker or cancer. IFC Electrical Stimulation: Rosetta received IFC Electrical Stimulation for pain control applied to the left shoulder. Pt received stimulation at 100 % scan at a frequency of 9.8 volts for 10 minutes. Rosetta tolderated treatment well without any adverse effects.       Home Exercises Provided: Yes    Pt demo good understanding of the education provided.     Education provided re:  Rosetta verbalized good understanding of education provided.   No spiritual or educational barriers to learning provided    Assessment     Patient tolerated treatment well. Pt completing treatment without pain or complication.     Patient reported good outcome from her appointment with MD. She stated her MD said she needed to take an anti-inflammatory medication for bilateral shoulder bursitis pain, but her bilateral shoulder mobility is good. OT and MD believe patient will do well completing therapeutic exercises at home after discharge in combination with  "anti-inflammatory medication.     Patient performed all therapeutic exercises with good form and endurance. Patient noted exercises are "getting easy." Progressed supine internal/external rotation, sidelying external rotation, and supine flies by 1# with good form, though patient noted this increase in weights felt challenging. Will progress therapeutic strengthening as indicated. Will provide exercises for home exercise program prior to discharge.     Patient is progressing well to achieve goals.     Goals:   Patient will increase LUE AROM to WFL at all joints in all planes of movement prior to discharge. GOAL MET  Patient will report no LUE pain when performing functional and therapeutic tasks prior to discharge. GOAL MET  Patient will increase LUE MMT grade to 5/5 at shoulder prior to discharge. In Progress  Patient will be compliant with HEP daily prior to discharge. In Progress    Plan     Continue with established Plan of Care towards OT goals.    Therapist: Sonja Watson OT  9/18/23       "

## 2023-09-21 ENCOUNTER — CLINICAL SUPPORT (OUTPATIENT)
Dept: REHABILITATION | Facility: HOSPITAL | Age: 63
End: 2023-09-21
Payer: COMMERCIAL

## 2023-09-21 DIAGNOSIS — G89.29 CHRONIC LEFT SHOULDER PAIN: Primary | ICD-10-CM

## 2023-09-21 DIAGNOSIS — M25.512 CHRONIC LEFT SHOULDER PAIN: Primary | ICD-10-CM

## 2023-09-21 PROCEDURE — 97014 ELECTRIC STIMULATION THERAPY: CPT | Mod: PN

## 2023-09-21 PROCEDURE — 97110 THERAPEUTIC EXERCISES: CPT | Mod: PN

## 2023-09-21 NOTE — PROGRESS NOTES
"[facility]  Occupational Therapy Treatment Note     Date: 9/21/2023  Name: Rosetta Toro  Clinic Number: [id number]    Therapy Diagnosis: No diagnosis found.  Physician: Irvin Gonzalez,*     Physician Orders: Eval and Treat  Medical Diagnosis: Chronic left shoulder pain  Surgical Procedure and Date: None   Evaluation Date: 8/18/2023  Insurance Authorization Period Expiration: 12/31/23  Plan of Care Certification Period: 09/29/23  Date of Return to MD: 9/18/23  Visit # / Visits authorized: 10/12 (11 including evaluation)  FOTO: 2/ 3     Precautions:  Standard       Time In: 2:28 PM  Time Out: 3:28 PM  Total Billable Time: 60 minutes     Subjective     Patient reports: "I am good today. No pain, and I took ibuprofen before before I came."  She was compliant with home exercise program given last session.   Response to previous treatment:"I felt good after."  Functional change: No changes reported since last session.    Pain: 0/10  Location: left shoulder      Objective     Objective Measures updated at progress report unless specified.    Treatment     Rosetta received the treatments listed below:     Rosetta received individual therapeutic exercises to develop strength, endurance, ROM, and flexibility for 50 minutes including:  Patient performed LUE internal/external rotation using 7# weights for 3 sets of 10 repetitions.  Patient performed LUE horizontal scapular rows using 8# weights for 3 sets of 10 repetitions.  Patient performed LUE shoulder flexion using 7# weights for 3 sets of 10 repetitions.   Patient performed LUE scaption external rotation using 7# weights for 3 sets of 10 repetitions.  Patient performed LUE bicep flexion using 8# weights for 3 sets of 10 repetitions.   Patient performed LUE horizontal extension using 7# weights for 3 sets of 10 repetitions.  Patient performed LUE horizontal abduction internal rotation using 7# weights for 3 sets of 10 " repetitions.  Patient performed LUE tricep kick-backs using 7# weights for 3 sets of 10 repetitions.  Patient performed LUE horizontal abduction external rotation using 7# weights for 3 sets of 10 repetitions.   Patient performed LUE supine straight arm press using 8# weights for 3 sets of repetitions.  Patient performed LUE supine tricep extension using 7# weights for 3 sets of repetitions.  Patient performed LUE supine bench press using 8# weights for 3 sets of repetitions.  Patient performed LUE supine flies (horizontal adduction) using 6# weights for 3 sets of repetitions.  Patient performed LUE supine forward flexion using 6# weights for 3 sets of repetitions.  Patient performed LUE supine scaption external rotation using 6# weights for 3 sets of repetitions.  Patient performed LUE supine internal/external rotation using 6# weights for 3 sets of repetitions.  Patient performed LUE sidelying external rotation using 6# weights for 3 sets of repetitions.  Patient performed BUE wall push-ups for 3 sets of 10 repetitions.      supervised modalities for 10 minutes after being cleared for contradictions: patient reports no history of pacemaker or cancer. IFC Electrical Stimulation: Rosetta received IFC Electrical Stimulation for pain control applied to the left shoulder. Pt received stimulation at 100 % scan at a frequency of 9.4 volts for 10 minutes. Rosetta tolderated treatment well without any adverse effects.     Patient Education and Home Exercises     Education provided:   - OT recorded videos of patient performing exercises on her phone, per patient's request.   - Progress towards goals     Written Home Exercises Provided: Patient instructed to cont prior HEP.  Exercises were reviewed and Rosetta was able to demonstrate them prior to the end of the session.  Rosetta demonstrated good  understanding of the home exercise program provided. See electronic medical record under Patient Instructions for exercises  "provided during therapy sessions.       Assessment     Patient reported feeling no pain on this date. Patient tolerated increased weighted resistance of 1# in all exercises except supine forward flexion, scaption external rotation, flies, internal/external rotation, and sidelying external rotation. Patient is progressing in strengthening. Patient performed exercises with good form and muscular endurance. Introduced wall push-up on this date, which patient reported "this is a good exercise, it works." Continued e-stim as patient reported feeling "great" after last session.     Patient requested OT record videos of patient performing therapeutic exercises for her home use when discharged after next two sessions.    Rosetta is progressing well towards her goals and there are no updates to goals at this time. Pt prognosis is Good.     Patient will continue to benefit from skilled outpatient occupational therapy to address the deficits listed in the problem list on initial evaluation provide patient/family education and to maximize patient's level of independence in the home and community environment.     Patient's spiritual, cultural and educational needs considered and patient agreeable to plan of care and goals.    Anticipated barriers to occupational therapy: None    Goals:  Patient will increase LUE AROM to WFL at all joints in all planes of movement prior to discharge. GOAL MET  Patient will report no LUE pain when performing functional and therapeutic tasks prior to discharge. GOAL MET  Patient will increase LUE MMT grade to 5/5 at shoulder prior to discharge. In Progress  Patient will be compliant with HEP daily prior to discharge. In Progress    Plan     Updates/Grading for next session: Plan to keep weights at same resistance as OT progressed resistance this session. Ensure patient is independent in home exercise program for discharge.    Sonja Watson, OT   9/21/2023     "

## 2023-09-25 ENCOUNTER — CLINICAL SUPPORT (OUTPATIENT)
Dept: REHABILITATION | Facility: HOSPITAL | Age: 63
End: 2023-09-25
Payer: COMMERCIAL

## 2023-09-25 DIAGNOSIS — M25.512 CHRONIC LEFT SHOULDER PAIN: Primary | ICD-10-CM

## 2023-09-25 DIAGNOSIS — G89.29 CHRONIC LEFT SHOULDER PAIN: Primary | ICD-10-CM

## 2023-09-25 PROCEDURE — 97110 THERAPEUTIC EXERCISES: CPT | Mod: PN

## 2023-09-25 PROCEDURE — 97014 ELECTRIC STIMULATION THERAPY: CPT | Mod: PN

## 2023-09-25 NOTE — PROGRESS NOTES
"[facility]  Occupational Therapy Treatment Note     Date: 9/25/2023  Name: Rosetta Toro  Clinic Number: [id number]    Therapy Diagnosis: Chronic left shoulder pain  Physician: Irvin Gonzalez,*     Physician Orders: Eval and Treat  Medical Diagnosis: Chronic left shoulder pain  Surgical Procedure and Date: None   Evaluation Date: 8/18/2023  Insurance Authorization Period Expiration: 12/31/23  Plan of Care Certification Period: 09/29/23  Date of Return to MD: Not scheduled  Visit # / Visits authorized: 11/12 (12 including evaluation)  FOTO: 3/ 3     Precautions:  Standard    Time In: 3:30 PM  Time Out: 4:25 PM   Total Billable Time: 55 minutes     Subjective     Patient reports: "I feel wonderful. My arm is like nothing ever happened."  She was compliant with home exercise program given last session.   Response to previous treatment: "I was not sore, I felt good."  Functional change: "I can do everything I need."    Pain: 0/10  Location: left shoulder      Objective     FOTO: 91%    Will complete measurements at discharge.    Treatment     Rosetta received the treatments listed below:     Rosetta received individual therapeutic exercises to develop strength, endurance, ROM, and flexibility for 45 minutes including:  Patient performed LUE internal/external rotation using 7# weights for 3 sets of 10 repetitions.  Patient performed LUE horizontal scapular rows using 8# weights for 3 sets of 10 repetitions.  Patient performed LUE shoulder flexion using 7# weights for 3 sets of 10 repetitions.   Patient performed LUE scaption external rotation using 7# weights for 3 sets of 10 repetitions.  Patient performed LUE bicep flexion using 8# weights for 3 sets of 10 repetitions.   Patient performed LUE horizontal extension using 7# weights for 3 sets of 10 repetitions.  Patient performed LUE horizontal abduction internal rotation using 7# weights for 3 sets of 10 repetitions.  Patient " performed LUE tricep kick-backs using 8# weights for 3 sets of 10 repetitions.  Patient performed LUE horizontal abduction external rotation using 7# weights for 3 sets of 10 repetitions.   Patient performed LUE supine straight arm press using 8# weights for 3 sets of repetitions.  Patient performed LUE supine tricep extension using 7# weights for 3 sets of repetitions.  Patient performed LUE supine bench press using 8# weights for 3 sets of repetitions.  Patient performed LUE supine flies (horizontal adduction) using 6# weights for 3 sets of repetitions.  Patient performed LUE supine forward flexion using 6# weights for 3 sets of repetitions.  Patient performed LUE supine scaption external rotation using 6# weights for 3 sets of repetitions.  Patient performed LUE supine internal/external rotation using 7# weights for 3 sets of repetitions.  Patient performed LUE sidelying external rotation using 7# weights for 3 sets of repetitions.  Patient performed BUE wall push-ups for 3 sets of 10 repetitions.      supervised modalities for 10 minutes after being cleared for contradictions: patient reports no history of pacemaker or cancer. IFC Electrical Stimulation: Rosetta received IFC Electrical Stimulation for pain control applied to the left shoulder. Pt received stimulation at 100 % scan at a frequency of 9.2 volts for 10 minutes. Rosetta tolderated treatment well without any adverse effects.     Patient Education and Home Exercises     Education provided:   - preparation for discharge  - Progress towards goals     Written Home Exercises Provided: Patient instructed to cont prior HEP.  Exercises were reviewed and Rosetta was able to demonstrate them prior to the end of the session.  Rosetta demonstrated good  understanding of the home exercise program provided. See electronic medical record under Patient Instructions for exercises provided during therapy sessions.       Assessment     Upon reassessment, patient's  "FOTO score increased by 28% since evaluation on 8/18/23, indicating increased functional engagement in daily living tasks. This is corroborated by patient statements that she can do "everything" she needs to do, including lifting boxes regularly at [employer] and cleaning the house. Patient stated she feels no pain in her left shoulder. She repeated that she is very happy with therapy services.     Patient tolerated increased weight by 1# in tricep kickbacks, supine internal/external rotation, and sidelying external rotation with good form. Patient performed remainder of therapeutic exercises without muscular strain, meaning she would benefit from progression of weighted resistance after discharge. Supine forward flexion, scaption external rotation, and flies continue to be the patient's most challenging movements, however patient demonstrates much improved strength as evidenced by no visible muscle shaking. Patient is well prepared for discharge at next session.    Rosetta is progressing well towards her goals and there are no updates to goals at this time. Pt prognosis is Good.     Patient will continue to benefit from skilled outpatient occupational therapy to address the deficits listed in the problem list on initial evaluation provide patient/family education and to maximize patient's level of independence in the home and community environment.     Patient's spiritual, cultural and educational needs considered and patient agreeable to plan of care and goals.    Anticipated barriers to occupational therapy: None    Goals:  Patient will increase LUE AROM to WFL at all joints in all planes of movement prior to discharge. GOAL MET  Patient will report no LUE pain when performing functional and therapeutic tasks prior to discharge. GOAL MET  Patient will increase LUE MMT grade to 5/5 at shoulder prior to discharge. In Progress  Patient will be compliant with HEP daily prior to discharge. In Progress    Plan "     Updates/Grading for next session: Complete reassessment of range of motion and provide home exercise program in preparation for discharge.    Sonja Watson, OT   9/25/2023

## 2023-09-28 NOTE — PROGRESS NOTES
"  Outpatient Therapy Discharge Summary     Name: Rosetta Toro  Clinic Number: [id number]     Therapy Diagnosis: Chronic left shoulder pain  Physician: Irvin Gonzalez,*     Physician Orders: Eval and Treat  Medical Diagnosis: Chronic left shoulder pain  Surgical Procedure and Date: None   Evaluation Date: 8/18/2023  Insurance Authorization Period Expiration: 12/31/23  Plan of Care Certification Period: 09/29/23  Date of Return to MD: Not scheduled  Visit # / Visits authorized: 12/12 (13 including evaluation)  FOTO: 3/3      Precautions:  Standard     Time In: 3:31 PM  Time Out: 4:23 PM   Total Billable Time: 52 minutes      Date of Last visit: 9/28/23  Total Visits Received: 12  Cancelled Visits: 3  No Show Visits: 0  Subjective      Patient reports: "I am cured. I feel great."  Response to previous treatment: "Everything was good."  Functional change: "I am running carrying 3 pounds now. I have 5 pounds, too."     Pain: 0/10  Location: left shoulder       Objective      FOTO: 91%     Left Shoulder Flexion: 160  Left Shoulder Extension: 50  Left Shoulder Abduction: 160  Left Shoulder External Rotation: 60  Left Shoulder Internal Rotation: T12     Treatment      Rosetta received individual therapeutic exercises to develop strength, endurance, ROM, and flexibility for 42 minutes including:  Patient performed LUE internal/external rotation using 7# weights for 3 sets of 10 repetitions.  Patient performed LUE horizontal scapular rows using 8# weights for 3 sets of 10 repetitions.  Patient performed LUE shoulder flexion using 7# weights for 3 sets of 10 repetitions.   Patient performed LUE scaption external rotation using 7# weights for 3 sets of 10 repetitions.  Patient performed LUE bicep flexion using 8# weights for 3 sets of 10 repetitions.   Patient performed LUE horizontal extension using 7# weights for 3 sets of 10 repetitions.  Patient performed LUE horizontal abduction internal rotation using 7# " weights for 3 sets of 10 repetitions.  Patient performed LUE tricep kick-backs using 8# weights for 3 sets of 10 repetitions.  Patient performed LUE horizontal abduction external rotation using 7# weights for 3 sets of 10 repetitions.   Patient performed LUE supine straight arm press using 8# weights for 3 sets of repetitions.  Patient performed LUE supine tricep extension using 7# weights for 3 sets of repetitions.  Patient performed LUE supine bench press using 8# weights for 3 sets of repetitions.  Patient performed LUE supine flies (horizontal adduction) using 7# weights for 3 sets of repetitions.  Patient performed LUE supine forward flexion using 7# weights for 3 sets of repetitions.  Patient performed LUE supine scaption external rotation using 7# weights for 3 sets of repetitions.  Patient performed LUE supine internal/external rotation using 7# weights for 3 sets of repetitions.  Patient performed LUE sidelying external rotation using 7# weights for 3 sets of repetitions.  Patient performed BUE wall push-ups for 3 sets of 10 repetitions.      supervised modalities for 10 minutes after being cleared for contradictions: patient reports no history of pacemaker or cancer. IFC Electrical Stimulation: Rosetta received IFC Electrical Stimulation for pain control applied to the left shoulder. Pt received stimulation at 100 % scan at a frequency of 11 volts for 10 minutes. Rosetta tolderated treatment well without any adverse effects.      Patient Education and Home Exercises      Education provided:   - preparation for discharge  - Progress towards goals      Written Home Exercises Provided: Patient instructed to cont prior HEP.  Exercises were reviewed and Rosetta was able to demonstrate them prior to the end of the session.  Rosetta demonstrated good  understanding of the home exercise program provided. See electronic medical record under Patient Instructions for exercises provided during therapy sessions.   "    Assessment      FOTO was completed at last visit, indicating increased functional engagement in daily living tasks by 28% more than at evaluation on 8/18/23.    Patient has achieved functional range of motion without pain in all daily living tasks. Patient demonstrated improved strength as evidenced by performance of therapeutic exercises using 7# and 8#, which is great growth since use of 0# to 2# at evaluation. Patient repeatedly expressed her gratitude for therapy services.    OT recorded patient performing all therapeutic exercises on her phone, per her request, in a previous session. Provided patient with handout of therapeutic exercises as well as listed in "Patient Instructions." Patient reported no concerns for discharge at this time.    Patient's treatment included:  - Therapeutic exercise/activities  - Modalities for pain management    Goals:  Patient will increase LUE AROM to WFL at all joints in all planes of movement prior to discharge. GOAL MET  Patient will report no LUE pain when performing functional and therapeutic tasks prior to discharge. GOAL MET  Patient will increase LUE MMT grade to 5/5 at shoulder prior to discharge. GOAL MET  Patient will be compliant with HEP daily prior to discharge. GOAL MET       Discharge reason: Patient has met all of his/her goals    Plan   This patient is discharged from Occupational Therapy         "

## 2023-09-29 ENCOUNTER — CLINICAL SUPPORT (OUTPATIENT)
Dept: REHABILITATION | Facility: HOSPITAL | Age: 63
End: 2023-09-29
Payer: COMMERCIAL

## 2023-09-29 DIAGNOSIS — M25.512 CHRONIC LEFT SHOULDER PAIN: Primary | ICD-10-CM

## 2023-09-29 DIAGNOSIS — G89.29 CHRONIC LEFT SHOULDER PAIN: Primary | ICD-10-CM

## 2023-09-29 PROCEDURE — 97110 THERAPEUTIC EXERCISES: CPT | Mod: PN

## 2023-09-29 PROCEDURE — 97014 ELECTRIC STIMULATION THERAPY: CPT | Mod: PN

## 2023-09-29 NOTE — PATIENT INSTRUCTIONS
Forward Flexion  Complete 3 sets of 10 repetitions.  Shoulder Exercise Program                                                                                                       1      Scaption External Rotation (thumbs up)  Complete 3 sets of 10 repetitions.  Shoulder Exercise Program                                                                                                       1    Bicep Flexion  Complete 3 sets of 10 repetitions.  Shoulder Exercise Program                                                                                                       1    Horizontal Abduction with Internal Rotation  Complete 3 sets of 10 repetitions.  Shoulder Exercise Program                                                                                                       1    Horizontal Abduction with External Rotation (thumbs up)  Same as above, but with thumbs pointing to ceiling. Complete 3 sets of 10 repetitions.  Shoulder Exercise Program                                                                                                       1    Horizontal Rows  Complete 3 sets of 10 repetitions.  Shoulder Exercise Program                                                                                                       1     Shoulder Extension  Complete 3 sets of 10 repetitions.  Shoulder Exercise Program                                                                                                       1     Bench Press  Complete 3 sets of 10 repetitions.  Shoulder Exercise Program                                                                                                       1    Serratus Punch  Complete 3 sets of 10 repetitions.  Shoulder Exercise Program                                                                                                       1    Supine Flies  Complete 3 sets of 10 repetitions.  Shoulder Exercise Program                                                                                                        1      Supine Internal to External Rotation  Complete 3 sets of 10 repetitions.  Shoulder Exercise Program                                                                                                       1    Sidelying External Rotation  Complete 3 sets of 10 repetitions.  Shoulder Exercise Program                                                                                                       1

## 2023-10-25 ENCOUNTER — LAB VISIT (OUTPATIENT)
Dept: LAB | Facility: HOSPITAL | Age: 63
End: 2023-10-25
Attending: STUDENT IN AN ORGANIZED HEALTH CARE EDUCATION/TRAINING PROGRAM
Payer: COMMERCIAL

## 2023-10-25 ENCOUNTER — OFFICE VISIT (OUTPATIENT)
Dept: FAMILY MEDICINE | Facility: CLINIC | Age: 63
End: 2023-10-25
Payer: COMMERCIAL

## 2023-10-25 VITALS
WEIGHT: 128.81 LBS | HEIGHT: 62 IN | DIASTOLIC BLOOD PRESSURE: 70 MMHG | BODY MASS INDEX: 23.7 KG/M2 | SYSTOLIC BLOOD PRESSURE: 110 MMHG | OXYGEN SATURATION: 97 % | RESPIRATION RATE: 16 BRPM | HEART RATE: 81 BPM

## 2023-10-25 DIAGNOSIS — E78.49 OTHER HYPERLIPIDEMIA: ICD-10-CM

## 2023-10-25 DIAGNOSIS — Z78.0 POSTMENOPAUSAL: ICD-10-CM

## 2023-10-25 DIAGNOSIS — M85.89 OSTEOPENIA OF MULTIPLE SITES: ICD-10-CM

## 2023-10-25 DIAGNOSIS — J30.9 ALLERGIC RHINITIS, UNSPECIFIED SEASONALITY, UNSPECIFIED TRIGGER: ICD-10-CM

## 2023-10-25 DIAGNOSIS — M25.562 CHRONIC PAIN OF LEFT KNEE: Primary | ICD-10-CM

## 2023-10-25 DIAGNOSIS — G89.29 CHRONIC PAIN OF LEFT KNEE: Primary | ICD-10-CM

## 2023-10-25 DIAGNOSIS — R73.03 PREDIABETES: ICD-10-CM

## 2023-10-25 DIAGNOSIS — E55.9 VITAMIN D DEFICIENCY: ICD-10-CM

## 2023-10-25 LAB
25(OH)D3+25(OH)D2 SERPL-MCNC: 40 NG/ML (ref 30–96)
ALBUMIN SERPL BCP-MCNC: 3.9 G/DL (ref 3.5–5.2)
ALBUMIN/CREAT UR: NORMAL UG/MG (ref 0–30)
ALP SERPL-CCNC: 59 U/L (ref 55–135)
ALT SERPL W/O P-5'-P-CCNC: 24 U/L (ref 10–44)
ANION GAP SERPL CALC-SCNC: 8 MMOL/L (ref 8–16)
AST SERPL-CCNC: 24 U/L (ref 10–40)
BASOPHILS # BLD AUTO: 0.04 K/UL (ref 0–0.2)
BASOPHILS NFR BLD: 0.8 % (ref 0–1.9)
BILIRUB SERPL-MCNC: 0.5 MG/DL (ref 0.1–1)
BUN SERPL-MCNC: 22 MG/DL (ref 8–23)
CALCIUM SERPL-MCNC: 9.6 MG/DL (ref 8.7–10.5)
CHLORIDE SERPL-SCNC: 105 MMOL/L (ref 95–110)
CHOLEST SERPL-MCNC: 289 MG/DL (ref 120–199)
CHOLEST/HDLC SERPL: 3.9 {RATIO} (ref 2–5)
CO2 SERPL-SCNC: 27 MMOL/L (ref 23–29)
CREAT SERPL-MCNC: 0.8 MG/DL (ref 0.5–1.4)
CREAT UR-MCNC: 94 MG/DL (ref 15–325)
DIFFERENTIAL METHOD: NORMAL
EOSINOPHIL # BLD AUTO: 0.2 K/UL (ref 0–0.5)
EOSINOPHIL NFR BLD: 3.6 % (ref 0–8)
ERYTHROCYTE [DISTWIDTH] IN BLOOD BY AUTOMATED COUNT: 13.9 % (ref 11.5–14.5)
EST. GFR  (NO RACE VARIABLE): >60 ML/MIN/1.73 M^2
ESTIMATED AVG GLUCOSE: 123 MG/DL (ref 68–131)
GLUCOSE SERPL-MCNC: 112 MG/DL (ref 70–110)
HBA1C MFR BLD: 5.9 % (ref 4–5.6)
HCT VFR BLD AUTO: 40.1 % (ref 37–48.5)
HDLC SERPL-MCNC: 74 MG/DL (ref 40–75)
HDLC SERPL: 25.6 % (ref 20–50)
HGB BLD-MCNC: 13.5 G/DL (ref 12–16)
IMM GRANULOCYTES # BLD AUTO: 0.02 K/UL (ref 0–0.04)
IMM GRANULOCYTES NFR BLD AUTO: 0.4 % (ref 0–0.5)
LDLC SERPL CALC-MCNC: 198.4 MG/DL (ref 63–159)
LYMPHOCYTES # BLD AUTO: 1.2 K/UL (ref 1–4.8)
LYMPHOCYTES NFR BLD: 25 % (ref 18–48)
MCH RBC QN AUTO: 29.4 PG (ref 27–31)
MCHC RBC AUTO-ENTMCNC: 33.7 G/DL (ref 32–36)
MCV RBC AUTO: 87 FL (ref 82–98)
MICROALBUMIN UR DL<=1MG/L-MCNC: <5 UG/ML
MONOCYTES # BLD AUTO: 0.5 K/UL (ref 0.3–1)
MONOCYTES NFR BLD: 9.3 % (ref 4–15)
NEUTROPHILS # BLD AUTO: 3 K/UL (ref 1.8–7.7)
NEUTROPHILS NFR BLD: 60.9 % (ref 38–73)
NONHDLC SERPL-MCNC: 215 MG/DL
NRBC BLD-RTO: 0 /100 WBC
PLATELET # BLD AUTO: 274 K/UL (ref 150–450)
PMV BLD AUTO: 10.9 FL (ref 9.2–12.9)
POTASSIUM SERPL-SCNC: 3.8 MMOL/L (ref 3.5–5.1)
PROT SERPL-MCNC: 7.3 G/DL (ref 6–8.4)
RBC # BLD AUTO: 4.59 M/UL (ref 4–5.4)
SODIUM SERPL-SCNC: 140 MMOL/L (ref 136–145)
TRIGL SERPL-MCNC: 83 MG/DL (ref 30–150)
TSH SERPL DL<=0.005 MIU/L-ACNC: 1.5 UIU/ML (ref 0.4–4)
WBC # BLD AUTO: 4.96 K/UL (ref 3.9–12.7)

## 2023-10-25 PROCEDURE — 84443 ASSAY THYROID STIM HORMONE: CPT | Performed by: STUDENT IN AN ORGANIZED HEALTH CARE EDUCATION/TRAINING PROGRAM

## 2023-10-25 PROCEDURE — 82043 UR ALBUMIN QUANTITATIVE: CPT | Performed by: STUDENT IN AN ORGANIZED HEALTH CARE EDUCATION/TRAINING PROGRAM

## 2023-10-25 PROCEDURE — 82306 VITAMIN D 25 HYDROXY: CPT | Performed by: STUDENT IN AN ORGANIZED HEALTH CARE EDUCATION/TRAINING PROGRAM

## 2023-10-25 PROCEDURE — 99214 OFFICE O/P EST MOD 30 MIN: CPT | Mod: S$GLB,,, | Performed by: STUDENT IN AN ORGANIZED HEALTH CARE EDUCATION/TRAINING PROGRAM

## 2023-10-25 PROCEDURE — 99999 PR PBB SHADOW E&M-EST. PATIENT-LVL IV: CPT | Mod: PBBFAC,,, | Performed by: STUDENT IN AN ORGANIZED HEALTH CARE EDUCATION/TRAINING PROGRAM

## 2023-10-25 PROCEDURE — 80053 COMPREHEN METABOLIC PANEL: CPT | Performed by: STUDENT IN AN ORGANIZED HEALTH CARE EDUCATION/TRAINING PROGRAM

## 2023-10-25 PROCEDURE — 83036 HEMOGLOBIN GLYCOSYLATED A1C: CPT | Performed by: STUDENT IN AN ORGANIZED HEALTH CARE EDUCATION/TRAINING PROGRAM

## 2023-10-25 PROCEDURE — 80061 LIPID PANEL: CPT | Performed by: STUDENT IN AN ORGANIZED HEALTH CARE EDUCATION/TRAINING PROGRAM

## 2023-10-25 PROCEDURE — 99214 PR OFFICE/OUTPT VISIT, EST, LEVL IV, 30-39 MIN: ICD-10-PCS | Mod: S$GLB,,, | Performed by: STUDENT IN AN ORGANIZED HEALTH CARE EDUCATION/TRAINING PROGRAM

## 2023-10-25 PROCEDURE — 99999 PR PBB SHADOW E&M-EST. PATIENT-LVL IV: ICD-10-PCS | Mod: PBBFAC,,, | Performed by: STUDENT IN AN ORGANIZED HEALTH CARE EDUCATION/TRAINING PROGRAM

## 2023-10-25 PROCEDURE — 85025 COMPLETE CBC W/AUTO DIFF WBC: CPT | Performed by: STUDENT IN AN ORGANIZED HEALTH CARE EDUCATION/TRAINING PROGRAM

## 2023-10-25 PROCEDURE — 36415 COLL VENOUS BLD VENIPUNCTURE: CPT | Performed by: STUDENT IN AN ORGANIZED HEALTH CARE EDUCATION/TRAINING PROGRAM

## 2023-10-25 NOTE — ASSESSMENT & PLAN NOTE
The 10-year ASCVD risk score (Sushil CEBALLOS, et al., 2019) is: 3.6%    Values used to calculate the score:      Age: 63 years      Sex: Female      Is Non- : No      Diabetic: No      Tobacco smoker: No      Systolic Blood Pressure: 110 mmHg      Is BP treated: No      HDL Cholesterol: 77 mg/dL      Total Cholesterol: 298 mg/dL  Stable. Continue current medications and regular followup.

## 2023-10-25 NOTE — PROGRESS NOTES
Subjective:       Patient ID: Rosetta Toro is a 63 y.o. female.    Chief Complaint: Annual Exam and Referral (Referral to Dr. Gonzalez for knee)    Patient Active Problem List:     Bursitis of right shoulder     Primary osteoarthritis     Uterine fibroid     Neuroma     Hallux valgus of right foot     Prediabetes     Plantar fasciitis     Other hyperlipidemia     Allergic rhinitis     Need for vaccination     Chronic right shoulder pain     Chronic left shoulder pain     Vitamin D deficiency    Current Outpatient Medications:  cholecalciferol, vitamin D3, 5,000 unit Tab, Take 5,000 Units by mouth once daily.  estradioL (ESTRACE) 0.01 % (0.1 mg/gram) vaginal cream, Place a pea-sized amount inside vagina every night for 1 week, and then 2-3 times a week.  fluticasone propionate (FLONASE) 50 mcg/actuation nasal spray, 1 spray (50 mcg total) by Each Nostril route once daily.  mupirocin (BACTROBAN) 2 % ointment, Apply 1 gram tid    No current facility-administered medications for this visit.          Review of Systems   Constitutional:  Negative for activity change and appetite change.   Respiratory:  Negative for shortness of breath.    Cardiovascular:  Negative for chest pain.   Gastrointestinal:  Negative for abdominal pain.   Genitourinary:  Negative for dysuria.   Integumentary:  Negative for rash.   Psychiatric/Behavioral:  Negative for dysphoric mood and sleep disturbance. The patient is not nervous/anxious.          Objective:      Physical Exam  Constitutional:       General: She is not in acute distress.     Appearance: Normal appearance. She is not ill-appearing.   Eyes:      Conjunctiva/sclera: Conjunctivae normal.   Cardiovascular:      Rate and Rhythm: Normal rate and regular rhythm.      Heart sounds: Normal heart sounds. No murmur heard.  Pulmonary:      Effort: Pulmonary effort is normal. No respiratory distress.      Breath sounds: Normal breath sounds.   Abdominal:      Palpations: Abdomen is soft.    Musculoskeletal:      Right lower leg: No edema.      Left lower leg: No edema.   Skin:     General: Skin is warm and dry.   Neurological:      Mental Status: She is alert. Mental status is at baseline.      Gait: Gait normal.   Psychiatric:         Mood and Affect: Mood normal.         Behavior: Behavior normal.         Thought Content: Thought content normal.         Judgment: Judgment normal.         Assessment:       1. Chronic pain of left knee    2. Vitamin D deficiency    3. Other hyperlipidemia    4. Prediabetes    5. Allergic rhinitis, unspecified seasonality, unspecified trigger    6. Osteopenia of multiple sites    7. Postmenopausal        Plan:       Problem List Items Addressed This Visit          ENT    Allergic rhinitis     Stable. Continue current medications and regular followup.              Cardiac/Vascular    Other hyperlipidemia     The 10-year ASCVD risk score (Sushil CEBALLOS, et al., 2019) is: 3.6%    Values used to calculate the score:      Age: 63 years      Sex: Female      Is Non- : No      Diabetic: No      Tobacco smoker: No      Systolic Blood Pressure: 110 mmHg      Is BP treated: No      HDL Cholesterol: 77 mg/dL      Total Cholesterol: 298 mg/dL  Stable. Continue current medications and regular followup.           Relevant Orders    Lipid Panel       Endocrine    Prediabetes     Lab Results   Component Value Date    HGBA1C 6.1 (H) 10/25/2022   Stable. Continue current medications and regular followup.           Relevant Orders    CBC Auto Differential    Comprehensive Metabolic Panel    Microalbumin/Creatinine Ratio, Urine    Hemoglobin A1C    TSH    Vitamin D deficiency     Stable. Continue current medications and regular followup.           Relevant Orders    Vitamin D       Orthopedic    Osteopenia of multiple sites     dexa  2015  Findings: The T score associated with the lumbar spine is -0.4.  The T score associated with the left femoral neck is -0.8.  The T  score associated with the right femoral neck is -1.3.  IMPRESSION:    Osteopenia    Not on ca/d  repeat         Relevant Orders    DXA Bone Density Axial Skeleton 1 or more sites     Other Visit Diagnoses       Chronic pain of left knee    -  Primary    Relevant Orders    Ambulatory referral/consult to Orthopedics    Postmenopausal        Relevant Orders    DXA Bone Density Axial Skeleton 1 or more sites

## 2023-10-25 NOTE — PATIENT INSTRUCTIONS

## 2023-10-25 NOTE — ASSESSMENT & PLAN NOTE
Lab Results   Component Value Date    HGBA1C 6.1 (H) 10/25/2022     Stable. Continue current medications and regular followup.

## 2023-10-25 NOTE — ASSESSMENT & PLAN NOTE
dexa  2015  Findings: The T score associated with the lumbar spine is -0.4.  The T score associated with the left femoral neck is -0.8.  The T score associated with the right femoral neck is -1.3.  IMPRESSION:    Osteopenia    Not on ca/d  repeat

## 2023-10-26 ENCOUNTER — HOSPITAL ENCOUNTER (OUTPATIENT)
Dept: RADIOLOGY | Facility: HOSPITAL | Age: 63
Discharge: HOME | End: 2023-10-26
Attending: STUDENT IN AN ORGANIZED HEALTH CARE EDUCATION/TRAINING PROGRAM
Payer: COMMERCIAL

## 2023-10-26 DIAGNOSIS — M85.89 OSTEOPENIA OF MULTIPLE SITES: ICD-10-CM

## 2023-10-26 DIAGNOSIS — Z78.0 POSTMENOPAUSAL: ICD-10-CM

## 2023-10-26 PROCEDURE — 77080 DXA BONE DENSITY AXIAL SKELETON 1 OR MORE SITES: ICD-10-PCS | Mod: 26,,, | Performed by: RADIOLOGY

## 2023-10-26 PROCEDURE — 77080 DXA BONE DENSITY AXIAL: CPT | Mod: 26,,, | Performed by: RADIOLOGY

## 2023-10-26 PROCEDURE — 77080 DXA BONE DENSITY AXIAL: CPT | Mod: TC

## 2023-10-26 RX ORDER — ATORVASTATIN CALCIUM 20 MG/1
20 TABLET, FILM COATED ORAL DAILY
Qty: 90 TABLET | Refills: 3 | Status: SHIPPED | OUTPATIENT
Start: 2023-10-26 | End: 2024-10-25

## 2023-10-26 NOTE — TELEPHONE ENCOUNTER
No care due was identified.  Health [facility] Embedded Care Due Messages. Reference number: [id number].   10/26/2023 10:16:45 AM CDT

## 2023-10-26 NOTE — TELEPHONE ENCOUNTER
----- Message from Brook Roberson sent at 10/26/2023 10:11 AM CDT -----  Contact: Patient  Type:  Needs Medical Advice    Who Called:   Patient    Pharmacy name and phone #:        [facility] - [place], [place] - [address]  [address]  [place] [address]  Phone: [phone] Fax: [phone]    Would the patient rather a call back or a response via [facility]?   Call back  Best Call Back Number:   [phone]    Additional Information:   States she would like to speak with someone in the office - states she received a message from Dr Evans advising she needs cholesterol medication - please call to advise - thank you

## 2023-10-27 DIAGNOSIS — M25.562 LEFT KNEE PAIN, UNSPECIFIED CHRONICITY: Primary | ICD-10-CM

## 2023-10-30 ENCOUNTER — OFFICE VISIT (OUTPATIENT)
Dept: ORTHOPEDICS | Facility: CLINIC | Age: 63
End: 2023-10-30
Payer: COMMERCIAL

## 2023-10-30 ENCOUNTER — HOSPITAL ENCOUNTER (OUTPATIENT)
Dept: RADIOLOGY | Facility: HOSPITAL | Age: 63
Discharge: HOME | End: 2023-10-30
Attending: ORTHOPAEDIC SURGERY
Payer: COMMERCIAL

## 2023-10-30 VITALS — HEART RATE: 64 BPM | BODY MASS INDEX: 23.55 KG/M2 | HEIGHT: 62 IN | OXYGEN SATURATION: 99 % | WEIGHT: 128 LBS

## 2023-10-30 DIAGNOSIS — M25.562 CHRONIC PAIN OF LEFT KNEE: ICD-10-CM

## 2023-10-30 DIAGNOSIS — G89.29 CHRONIC PAIN OF LEFT KNEE: ICD-10-CM

## 2023-10-30 DIAGNOSIS — M25.562 LEFT KNEE PAIN, UNSPECIFIED CHRONICITY: ICD-10-CM

## 2023-10-30 PROCEDURE — 99999 PR PBB SHADOW E&M-EST. PATIENT-LVL III: ICD-10-PCS | Mod: PBBFAC,,, | Performed by: ORTHOPAEDIC SURGERY

## 2023-10-30 PROCEDURE — 99214 PR OFFICE/OUTPT VISIT, EST, LEVL IV, 30-39 MIN: ICD-10-PCS | Mod: 25,S$GLB,, | Performed by: ORTHOPAEDIC SURGERY

## 2023-10-30 PROCEDURE — 73562 XR KNEE 3 VIEW LEFT: ICD-10-PCS | Mod: 26,LT,, | Performed by: RADIOLOGY

## 2023-10-30 PROCEDURE — 73562 X-RAY EXAM OF KNEE 3: CPT | Mod: TC,PN,LT

## 2023-10-30 PROCEDURE — 20610 DRAIN/INJ JOINT/BURSA W/O US: CPT | Mod: LT,S$GLB,, | Performed by: ORTHOPAEDIC SURGERY

## 2023-10-30 PROCEDURE — 20610 LARGE JOINT ASPIRATION/INJECTION: L KNEE: ICD-10-PCS | Mod: LT,S$GLB,, | Performed by: ORTHOPAEDIC SURGERY

## 2023-10-30 PROCEDURE — 99999 PR PBB SHADOW E&M-EST. PATIENT-LVL III: CPT | Mod: PBBFAC,,, | Performed by: ORTHOPAEDIC SURGERY

## 2023-10-30 PROCEDURE — 99214 OFFICE O/P EST MOD 30 MIN: CPT | Mod: 25,S$GLB,, | Performed by: ORTHOPAEDIC SURGERY

## 2023-10-30 PROCEDURE — 73562 X-RAY EXAM OF KNEE 3: CPT | Mod: 26,LT,, | Performed by: RADIOLOGY

## 2023-10-30 RX ORDER — TRIAMCINOLONE ACETONIDE 40 MG/ML
40 INJECTION, SUSPENSION INTRA-ARTICULAR; INTRAMUSCULAR
Status: DISCONTINUED | OUTPATIENT
Start: 2023-10-30 | End: 2023-10-30 | Stop reason: HOSPADM

## 2023-10-30 RX ADMIN — TRIAMCINOLONE ACETONIDE 40 MG: 40 INJECTION, SUSPENSION INTRA-ARTICULAR; INTRAMUSCULAR at 01:10

## 2023-10-30 NOTE — PROGRESS NOTES
Subjective:      Patient ID: Rosetta Toro is a 63 y.o. female.    Chief Complaint: Pain of the Left Knee    HPI  Patient is here with a new complaint approximate one-month of left knee pain.  She states she stepped awkwardly off of a stool had acute pain in her knee.  Describing a 5/10 pain with prolonged standing and walking.  She is concerned that it may limit her daily walks etc..  She is tried some over-the-counter ibuprofen with some mild improvement.  ROS      Objective:    Ortho Exam     Constitutional:   Patient is alert  and oriented in no acute distress  HEENT:  normocephalic atraumatic; PERRL EOMI  Neck:  Supple without adenopathy  Cardiovascular:  Normal rate and rhythm  Pulmonary:  Normal respiratory effort normal chest wall expansion  Abdominal:  Nonprotuberant nondistended  Musculoskeletal:  She has a slightly antalgic gait some diffuse medial tenderness  An equivocal Toña's no gross instability or effusion good motor strength  Neurological:  No focal defect; cranial nerves 2-12 grossly intact  Psychiatric/behavioral:  Mood and behavior normal      X-Ray Knee 3 View Left  Narrative: EXAMINATION:  XR KNEE 3 VIEW LEFT    CLINICAL HISTORY:  Pain in left knee    TECHNIQUE:  AP, lateral, and Merchant views of the left knee were performed.    COMPARISON:  None    FINDINGS:  No acute fracture, dislocation, or osseous destructive process.  There is left quadriceps tendon insertion patellar enthesophyte formation.  No chondrocalcinosis.  No left knee joint effusion.  Impression: As above    Electronically signed by: Nick Bains MD  Date:    10/30/2023  Time:    12:47       My Radiographs Findings:    I have personally reviewed radiographs and concur with above findings    Assessment:       Encounter Diagnosis   Name Primary?    Chronic pain of left knee          Plan:       I have discussed medical condition treatment options at length.  After a verbal consent and sterile prep I injected her left knee  today without complication with a combination of cc of Kenalog several cc of lidocaine.  We have provided some rehab exercises I have suggested that she continue with her NSAIDs and follow up me in 4-6 weeks if symptoms fail to improve sooner if any questions        Past Medical History:   Diagnosis Date    Chicken pox     Hepatitis A     fatty liver    Hyperlipidemia     Osteoarthritis     Shingles      Past Surgical History:   Procedure Laterality Date    eximer laser      EYE SURGERY  May 31, 2002    Excimer Laser    MYOMECTOMY           Current Outpatient Medications:     atorvastatin (LIPITOR) 20 MG tablet, Take 1 tablet (20 mg total) by mouth once daily., Disp: 90 tablet, Rfl: 3    cholecalciferol, vitamin D3, 5,000 unit Tab, Take 5,000 Units by mouth once daily., Disp: , Rfl:     estradioL (ESTRACE) 0.01 % (0.1 mg/gram) vaginal cream, Place a pea-sized amount inside vagina every night for 1 week, and then 2-3 times a week., Disp: 42.5 g, Rfl: 6    fluticasone propionate (FLONASE) 50 mcg/actuation nasal spray, 1 spray (50 mcg total) by Each Nostril route once daily., Disp: 18 mL, Rfl: 11    mupirocin (BACTROBAN) 2 % ointment, Apply 1 gram tid, Disp: 30 g, Rfl: 0    Review of patient's allergies indicates:   Allergen Reactions    Meloxicam Hives    Merthiolate (benzalkonium) [benzalkonium chloride]        Family History   Problem Relation Age of Onset    Cancer Mother     Stomach cancer Mother     Breast cancer Neg Hx     Ovarian cancer Neg Hx      Social History     Occupational History    Not on file   Tobacco Use    Smoking status: Former     Current packs/day: 0.00     Types: Cigarettes    Smokeless tobacco: Former   Substance and Sexual Activity    Alcohol use: No    Drug use: No    Sexual activity: Never

## 2023-10-30 NOTE — PROCEDURES
Large Joint Aspiration/Injection: L knee    Date/Time: 10/30/2023 1:00 PM    Performed by: Irvin Gonzalez MD  Authorized by: Irvin Gonzalez MD    Consent Done?:  Yes (Verbal)  Indications:  Pain  Site marked: the procedure site was marked    Timeout: prior to procedure the correct patient, procedure, and site was verified    Local anesthetic:  Lidocaine 1% without epinephrine and bupivacaine 0.25% without epinephrine  Anesthetic total (ml):  6      Details:  Needle Size:  20 G  Approach:  Anterolateral  Location:  Knee  Site:  L knee  Medications:  40 mg triamcinolone acetonide 40 mg/mL  Patient tolerance:  Patient tolerated the procedure well with no immediate complications

## 2024-01-31 ENCOUNTER — OFFICE VISIT (OUTPATIENT)
Dept: OBSTETRICS AND GYNECOLOGY | Facility: CLINIC | Age: 64
End: 2024-01-31
Payer: COMMERCIAL

## 2024-01-31 ENCOUNTER — TELEPHONE (OUTPATIENT)
Dept: OBSTETRICS AND GYNECOLOGY | Facility: CLINIC | Age: 64
End: 2024-01-31

## 2024-01-31 VITALS — BODY MASS INDEX: 24.14 KG/M2 | HEIGHT: 62 IN | WEIGHT: 131.19 LBS

## 2024-01-31 DIAGNOSIS — N95.2 ATROPHIC VAGINITIS: ICD-10-CM

## 2024-01-31 DIAGNOSIS — Z01.419 WELL WOMAN EXAM WITH ROUTINE GYNECOLOGICAL EXAM: Primary | ICD-10-CM

## 2024-01-31 DIAGNOSIS — Z79.890 HORMONE REPLACEMENT THERAPY (HRT): ICD-10-CM

## 2024-01-31 PROCEDURE — 88175 CYTOPATH C/V AUTO FLUID REDO: CPT | Performed by: OBSTETRICS & GYNECOLOGY

## 2024-01-31 PROCEDURE — 99396 PREV VISIT EST AGE 40-64: CPT | Mod: S$GLB,,, | Performed by: OBSTETRICS & GYNECOLOGY

## 2024-01-31 PROCEDURE — 99999 PR PBB SHADOW E&M-EST. PATIENT-LVL III: CPT | Mod: PBBFAC,,, | Performed by: OBSTETRICS & GYNECOLOGY

## 2024-01-31 RX ORDER — FLUCONAZOLE 150 MG/1
150 TABLET ORAL ONCE
Qty: 2 TABLET | Refills: 3 | Status: SHIPPED | OUTPATIENT
Start: 2024-01-31 | End: 2024-01-31

## 2024-01-31 NOTE — PROGRESS NOTES
CC: Annual check-up    SUBJECTIVE:   63 y.o. female   for annual routine Pap and checkup. No LMP recorded (lmp unknown). Patient is postmenopausal..  She has no unusual complaints and estarce helps, used 2 x per wk, when for gets gets yeast infxn and wants refill on diflucan which helps.    No vb, still painting and enjoying nursing home    Past Medical History:   Diagnosis Date    Chicken pox     Hepatitis A     fatty liver    Hyperlipidemia     Osteoarthritis     Shingles      Past Surgical History:   Procedure Laterality Date    eximer laser      EYE SURGERY  May 31, 2002    Excimer Laser    MYOMECTOMY       Social History     Socioeconomic History    Marital status:    Tobacco Use    Smoking status: Former     Current packs/day: 0.00     Types: Cigarettes    Smokeless tobacco: Former   Substance and Sexual Activity    Alcohol use: No    Drug use: No    Sexual activity: Never     Social Determinants of Health     Financial Resource Strain: Low Risk  (8/3/2023)    Overall Financial Resource Strain (CARDIA)     Difficulty of Paying Living Expenses: Not hard at all   Food Insecurity: No Food Insecurity (8/3/2023)    Hunger Vital Sign     Worried About Running Out of Food in the Last Year: Never true     Ran Out of Food in the Last Year: Never true   Transportation Needs: No Transportation Needs (8/3/2023)    PRAPARE - Transportation     Lack of Transportation (Medical): No     Lack of Transportation (Non-Medical): No   Physical Activity: Sufficiently Active (8/3/2023)    Exercise Vital Sign     Days of Exercise per Week: 6 days     Minutes of Exercise per Session: 40 min   Stress: No Stress Concern Present (8/3/2023)    [employer] - Occupational Stress Questionnaire     Feeling of Stress : Not at all   Social Connections: Unknown (8/3/2023)    Social Connection and Isolation Panel [NHANES]     Frequency of Communication with Friends and Family: More than three times a week      Frequency of Social Gatherings with Friends and Family: Three times a week     Active Member of Clubs or Organizations: Yes     Attends Club or Organization Meetings: More than 4 times per year     Marital Status:    Housing Stability: Low Risk  (8/3/2023)    Housing Stability Vital Sign     Unable to Pay for Housing in the Last Year: No     Number of Places Lived in the Last Year: 1     Unstable Housing in the Last Year: No     Family History   Problem Relation Age of Onset    Cancer Mother     Stomach cancer Mother     Breast cancer Neg Hx     Ovarian cancer Neg Hx      OB History    Para Term  AB Living   0 0 0 0 0 0   SAB IAB Ectopic Multiple Live Births   0 0 0 0           Current Outpatient Medications   Medication Sig Dispense Refill    atorvastatin (LIPITOR) 20 MG tablet Take 1 tablet (20 mg total) by mouth once daily. 90 tablet 3    cholecalciferol, vitamin D3, 5,000 unit Tab Take 5,000 Units by mouth once daily.      estradioL (ESTRACE) 0.01 % (0.1 mg/gram) vaginal cream Place a pea-sized amount inside vagina every night for 1 week, and then 2-3 times a week. (Patient not taking: Reported on 2024) 42.5 g 6    fluticasone propionate (FLONASE) 50 mcg/actuation nasal spray 1 spray (50 mcg total) by Each Nostril route once daily. (Patient not taking: Reported on 2024) 18 mL 11    mupirocin (BACTROBAN) 2 % ointment Apply 1 gram tid (Patient not taking: Reported on 2024) 30 g 0     No current facility-administered medications for this visit.     Allergies: Meloxicam and Merthiolate (benzalkonium) [benzalkonium chloride]     ROS:  Constitutional: no weight loss, weight gain, fever, fatigue  Eyes:  No vision changes, glasses/contacts  ENT/Mouth: No ulcers, sinus problems, ears ringing, headache  Cardiovascular: No inability to lie flat, chest pain, exercise intolerance, swelling, heart palpitations  Respiratory: No wheezing, coughing blood, shortness of breath, or  "cough  Gastrointestinal: No diarrhea, bloody stool, nausea/vomiting, constipation, gas, hemorrhoids  Genitourinary: No blood in urine, painful urination, urgency of urination, frequency of urination, incomplete emptying, incontinence, abnormal bleeding, painful periods, heavy periods, vaginal discharge, vaginal odor, painful intercourse, sexual problems, bleeding after intercourse.  Musculoskeletal: No muscle weakness  Skin/Breast: No painful breasts, nipple discharge, masses, rash, ulcers  Neurological: No passing out, seizures, numbness, headache  Endocrine: No diabetes, hypothyroid, hyperthyroid, hot flashes, hair loss, abnormal hair growth, ance  Psychiatric: No depression, crying  Hematologic: No bruises, bleeding, swollen lymph nodes, anemia.      OBJECTIVE:   The patient appears well, alert, oriented x 3, in no distress.  Ht 5' 2" (1.575 m)   Wt 59.5 kg (131 lb 2.8 oz)   LMP  (LMP Unknown)   BMI 23.99 kg/m²   NECK: no thyromegaly, trachea midline  SKIN: no acne, striae, hirsutism  BREAST EXAM: breasts appear normal, no suspicious masses, no skin or nipple changes or axillary nodes  ABDOMEN: no hernias, masses, or hepatosplenomegaly  GENITALIA: normal external genitalia, no erythema, no discharge  URETHRA: normal urethra, normal urethral meatus  VAGINA: mucosal atrophy  CERVIX: no lesions or cervical motion tenderness  UTERUS: enlarged, 14 weeks size and irregular  ADNEXA: normal adnexa and no mass, fullness, tenderness      ASSESSMENT:   well woman  no contraindication to continue hormonal therapy  1. Well woman exam with routine gynecological exam    2. Atrophic vaginitis    3. Hormone replacement therapy (HRT)        PLAN:   mammogram  pap smear  return annually or prn       " stated

## 2024-01-31 NOTE — TELEPHONE ENCOUNTER
----- Message from Ellie Phillips sent at 1/31/2024  8:22 AM CST -----  Contact: pt  Type:  Running Late     Who Called: pt  Would the patient rather a call back or a response via [facility]? Call   Best Call Back Number: [phone]   Additional Information:   Pt stated she's running late due to traffic

## 2024-02-06 LAB
FINAL PATHOLOGIC DIAGNOSIS: NORMAL
Lab: NORMAL

## 2024-02-26 DIAGNOSIS — Z00.00 WELL ADULT EXAM: ICD-10-CM

## 2024-02-26 DIAGNOSIS — J30.9 ALLERGIC RHINITIS, UNSPECIFIED SEASONALITY, UNSPECIFIED TRIGGER: ICD-10-CM

## 2024-02-26 DIAGNOSIS — E78.49 OTHER HYPERLIPIDEMIA: ICD-10-CM

## 2024-02-26 DIAGNOSIS — R73.03 PREDIABETES: ICD-10-CM

## 2024-02-26 RX ORDER — FLUTICASONE PROPIONATE 50 MCG
1 SPRAY, SUSPENSION (ML) NASAL DAILY
Qty: 48 ML | Refills: 2 | Status: SHIPPED | OUTPATIENT
Start: 2024-02-26

## 2024-02-26 NOTE — TELEPHONE ENCOUNTER
----- Message from Em Miller sent at 2/26/2024  8:18 AM CST -----  Contact: PT  Type:  RX Refill Request    Who Called: PT   Refill or New Rx:REFILL   RX Name and Strength:fluticasone propionate (FLONASE) 50 mcg/actuation nasal spray  How is the patient currently taking it? (ex. 1XDay):ONE SPRAY IN EACH NOSTRIL AS NEEDED   Is this a 30 day or 90 day RX:90  Preferred Pharmacy with phone number:  [facility] [address] [place] [address], [place] - [address] [address]  Phone: [phone] Fax: [phone]  Local or Mail Order:LOCAL  Ordering Provider:JAVY   Would the patient rather a call back or a response via [facility]? CALL   Best Call Back Number:[phone] (home)   Additional Information: THANK YOU

## 2024-02-26 NOTE — TELEPHONE ENCOUNTER
Refill Routing Note   Medication(s) are not appropriate for processing by [facility] for the following reason(s):        No active prescription written by provider    [facility] action(s):  Defer               Appointments  past 12m or future 3m with PCP    Date Provider   Last Visit   10/25/2023 Mara Evans MD   Next Visit   4/24/2024 Mara Evans MD   ED visits in past 90 days: 0        Note composed:10:03 AM 02/26/2024

## 2024-02-26 NOTE — TELEPHONE ENCOUNTER
No care due was identified.  Health [facility] Embedded Care Due Messages. Reference number: [id number].   2/26/2024 8:22:30 AM CST

## 2024-03-14 RX ORDER — ESTRADIOL 0.1 MG/G
CREAM VAGINAL
Qty: 42.5 G | Refills: 6 | Status: SHIPPED | OUTPATIENT
Start: 2024-03-14

## 2024-03-14 NOTE — TELEPHONE ENCOUNTER
----- Message from Deng Harrington sent at 3/14/2024  8:36 AM CDT -----  .Type:  RX Refill Request    Who Called: pt  Refill or New Rx:refill  RX Name and Strength:estradioL (ESTRACE) 0.01 % (0.1 mg/gram) vaginal cream  How is the patient currently taking it? (ex. 1XDay):Place a pea-sized amount inside vagina every night for 1 week, and then 2-3 times a week  Is this a 30 day or 90 day RX:42.5 g  Preferred Pharmacy with phone number:[facility], [place]  [address]  Local or Mail Order:local  Ordering Provider:Kevin  Would the patient rather a call back or a response via [facility]? Call back  Best Call Back Number:[phone]  Additional Information:

## 2024-03-14 NOTE — TELEPHONE ENCOUNTER
Refill Routing Note   Medication(s) are not appropriate for processing by [facility] for the following reason(s):        Outside of protocol  Due for refill >6 months ago    [facility] action(s):  Defer               Appointments  past 12m or future 3m with PCP    Date Provider   Last Visit   1/31/2024 Raúl Johnson MD   Next Visit   Visit date not found Raúl Johnson MD   ED visits in past 90 days: 0        Note composed:2:22 PM 03/14/2024

## 2024-03-21 ENCOUNTER — TELEPHONE (OUTPATIENT)
Dept: OPTOMETRY | Facility: CLINIC | Age: 64
End: 2024-03-21
Payer: COMMERCIAL

## 2024-03-21 NOTE — TELEPHONE ENCOUNTER
----- Message from Shaina Giang sent at 3/21/2024  9:12 AM CDT -----  Regarding: Referral Inquiry  Patient called in to confirm office received referral sent by  with Mateo pyle ms yesterday.     Please call back to further [phone]

## 2024-03-27 ENCOUNTER — HOSPITAL ENCOUNTER (OUTPATIENT)
Dept: RADIOLOGY | Facility: HOSPITAL | Age: 64
Discharge: HOME | End: 2024-03-27
Attending: STUDENT IN AN ORGANIZED HEALTH CARE EDUCATION/TRAINING PROGRAM
Payer: COMMERCIAL

## 2024-03-27 DIAGNOSIS — Z12.31 ENCOUNTER FOR SCREENING MAMMOGRAM FOR BREAST CANCER: ICD-10-CM

## 2024-03-27 PROCEDURE — 77063 BREAST TOMOSYNTHESIS BI: CPT | Mod: 26,,, | Performed by: RADIOLOGY

## 2024-03-27 PROCEDURE — 77067 SCR MAMMO BI INCL CAD: CPT | Mod: 26,,, | Performed by: RADIOLOGY

## 2024-03-27 PROCEDURE — 77067 SCR MAMMO BI INCL CAD: CPT | Mod: TC

## 2024-11-11 DIAGNOSIS — R73.03 PREDIABETES: ICD-10-CM

## 2024-11-27 ENCOUNTER — PATIENT MESSAGE (OUTPATIENT)
Dept: FAMILY MEDICINE | Facility: CLINIC | Age: 64
End: 2024-11-27
Payer: COMMERCIAL

## 2025-02-10 ENCOUNTER — PATIENT MESSAGE (OUTPATIENT)
Dept: FAMILY MEDICINE | Facility: CLINIC | Age: 65
End: 2025-02-10
Payer: COMMERCIAL

## 2025-02-28 ENCOUNTER — TELEPHONE (OUTPATIENT)
Dept: ORTHOPEDICS | Facility: CLINIC | Age: 65
End: 2025-02-28
Payer: COMMERCIAL

## 2025-02-28 DIAGNOSIS — M25.562 PAIN IN BOTH KNEES, UNSPECIFIED CHRONICITY: Primary | ICD-10-CM

## 2025-02-28 DIAGNOSIS — G89.29 CHRONIC PAIN OF LEFT KNEE: Primary | ICD-10-CM

## 2025-02-28 DIAGNOSIS — M25.562 CHRONIC PAIN OF LEFT KNEE: Primary | ICD-10-CM

## 2025-02-28 DIAGNOSIS — M25.561 PAIN IN BOTH KNEES, UNSPECIFIED CHRONICITY: Primary | ICD-10-CM

## 2025-02-28 NOTE — TELEPHONE ENCOUNTER
Returned call to pt- advised new order is placed for bilateral knee pain.   Pt verbalized understanding.   Inquired about hand issue- advised that would be a separate appointment and yes she will need an xray for that.

## 2025-02-28 NOTE — TELEPHONE ENCOUNTER
----- Message from Coppola sent at 2/28/2025  9:43 AM CST -----  Contact: pt [phone]  Type:  Needs Medical AdviceWho Called: PTWould the patient rather a call back or a response via [facility]? Call Rupa Call Back Number: [phone] Information: pt is having an xray done on 3/3/25 for her left knee. Pt Is complaining that her right knee is also bothering her and is wondering if she can get a order for her right knee to be done as well at the same appt.

## 2025-03-03 ENCOUNTER — HOSPITAL ENCOUNTER (OUTPATIENT)
Dept: RADIOLOGY | Facility: HOSPITAL | Age: 65
Discharge: HOME | End: 2025-03-03
Attending: ORTHOPAEDIC SURGERY
Payer: MEDICARE

## 2025-03-03 ENCOUNTER — PATIENT MESSAGE (OUTPATIENT)
Dept: ORTHOPEDICS | Facility: CLINIC | Age: 65
End: 2025-03-03

## 2025-03-03 ENCOUNTER — OFFICE VISIT (OUTPATIENT)
Dept: ORTHOPEDICS | Facility: CLINIC | Age: 65
End: 2025-03-03
Payer: MEDICARE

## 2025-03-03 ENCOUNTER — PATIENT MESSAGE (OUTPATIENT)
Dept: FAMILY MEDICINE | Facility: CLINIC | Age: 65
End: 2025-03-03
Payer: MEDICARE

## 2025-03-03 VITALS — HEIGHT: 62 IN | WEIGHT: 131.19 LBS | BODY MASS INDEX: 24.14 KG/M2

## 2025-03-03 DIAGNOSIS — M25.562 PAIN IN BOTH KNEES, UNSPECIFIED CHRONICITY: ICD-10-CM

## 2025-03-03 DIAGNOSIS — M25.561 PAIN IN BOTH KNEES, UNSPECIFIED CHRONICITY: ICD-10-CM

## 2025-03-03 DIAGNOSIS — E55.9 VITAMIN D DEFICIENCY: ICD-10-CM

## 2025-03-03 DIAGNOSIS — E78.49 OTHER HYPERLIPIDEMIA: ICD-10-CM

## 2025-03-03 DIAGNOSIS — R73.03 PREDIABETES: Primary | ICD-10-CM

## 2025-03-03 DIAGNOSIS — M79.645 THUMB PAIN, LEFT: Primary | ICD-10-CM

## 2025-03-03 PROCEDURE — 73562 X-RAY EXAM OF KNEE 3: CPT | Mod: 26,50,, | Performed by: RADIOLOGY

## 2025-03-03 PROCEDURE — 73562 X-RAY EXAM OF KNEE 3: CPT | Mod: TC,50,PN

## 2025-03-03 PROCEDURE — 99999 PR PBB SHADOW E&M-EST. PATIENT-LVL III: CPT | Mod: PBBFAC,,, | Performed by: ORTHOPAEDIC SURGERY

## 2025-03-03 PROCEDURE — 99214 OFFICE O/P EST MOD 30 MIN: CPT | Mod: S$GLB,,, | Performed by: ORTHOPAEDIC SURGERY

## 2025-03-03 NOTE — PROGRESS NOTES
Subjective:      Patient ID: Rosetta Toro is a 64 y.o. female.    Chief Complaint: Pain and Swelling of the Right Knee and Pain and Swelling of the Left Knee    HPI  Patient returns for follow up left greater than right bilateral knee pain.  She has recently has traveled out of country and was told that she likely had some tendinitis.  She denies any trauma she has had no recent treatment for this.  She has pain with prolonged standing and walking.  ROS      Objective:    Ortho Exam     Constitutional:   Patient is alert  and oriented in no acute distress  HEENT:  normocephalic atraumatic; PERRL EOMI  Neck:  Supple without adenopathy  Cardiovascular:  Normal rate and rhythm  Pulmonary:  Normal respiratory effort normal chest wall expansion  Abdominal:  Nonprotuberant nondistended  Musculoskeletal:  Patient has a steady nonantalgic gait  Good bilateral knee range of motion  Diffuse medial and posteromedial joint line tenderness bilaterally left greater than right  No gross instability or effusions some slight crepitus with range of motion  No increased warmth or erythema  Neurological:  No focal defect; cranial nerves 2-12 grossly intact  Psychiatric/behavioral:  Mood and behavior normal      X-Ray Knee 3 View Bilateral  EXAMINATION:  XR KNEE 3 VIEW BILATERAL    CLINICAL HISTORY:  Pain in right knee    TECHNIQUE:  AP, lateral, and Merchant views of both knees were performed.    COMPARISON:  10/30/2023.    FINDINGS:  Mild degenerative osteoarthrosis of the bilateral medial compartments with minimal joint space narrowing and small osteophyte formation.  No significant right or left suprapatellar effusion.  Bilateral quadriceps enthesophyte formation.    Electronically signed by: Catrachito Dolan  Date:    03/03/2025  Time:    10:05       My Radiographs Findings:    Radiographs show mild degenerative changes primarily of the medial patellofemoral compartment  Assessment:       Encounter Diagnoses   Name Primary?     Thumb pain, left Yes    Pain in both knees, unspecified chronicity          Plan:       I have discussed medical condition treatment options with her at length she declines injections and physical therapy or prescription medications.  We have provided some knee rehab exercises.  If symptoms fail to improve she can consider 1 of the other given treatment options.  Activities as tolerated follow up can be as needed.        Past Medical History:   Diagnosis Date    Chicken pox     Hepatitis A     fatty liver    Hyperlipidemia     Osteoarthritis     Shingles      Past Surgical History:   Procedure Laterality Date    eximer laser      EYE SURGERY  May 31, 2002    Excimer Laser    MYOMECTOMY         Current Medications[1]    Review of patient's allergies indicates:   Allergen Reactions    Meloxicam Hives    Merthiolate (benzalkonium) [benzalkonium chloride]        Family History   Problem Relation Name Age of Onset    Cancer Mother mackenzie zabala     Stomach cancer Mother mackenzie zabala     Breast cancer Neg Hx      Ovarian cancer Neg Hx       Social History     Occupational History    Not on file   Tobacco Use    Smoking status: Former     Current packs/day: 0.00     Types: Cigarettes    Smokeless tobacco: Former   Substance and Sexual Activity    Alcohol use: No    Drug use: No    Sexual activity: Never            [1]   Current Outpatient Medications:     cholecalciferol, vitamin D3, 5,000 unit Tab, Take 5,000 Units by mouth once daily., Disp: , Rfl:     estradioL (ESTRACE) 0.01 % (0.1 mg/gram) vaginal cream, Place a pea-sized amount inside vagina every night for 1 week, and then 2-3 times a week., Disp: 42.5 g, Rfl: 6    fluticasone propionate (FLONASE) 50 mcg/actuation nasal spray, 1 spray (50 mcg total) by Each Nostril route once daily., Disp: 48 mL, Rfl: 2    mupirocin (BACTROBAN) 2 % ointment, Apply 1 gram tid, Disp: 30 g, Rfl: 0    atorvastatin (LIPITOR) 20 MG tablet, Take 1 tablet (20 mg total) by  mouth once daily., Disp: 90 tablet, Rfl: 3

## 2025-03-05 ENCOUNTER — LAB VISIT (OUTPATIENT)
Dept: LAB | Facility: HOSPITAL | Age: 65
End: 2025-03-05
Attending: STUDENT IN AN ORGANIZED HEALTH CARE EDUCATION/TRAINING PROGRAM
Payer: MEDICARE

## 2025-03-05 DIAGNOSIS — M25.561 PAIN IN BOTH KNEES, UNSPECIFIED CHRONICITY: Primary | ICD-10-CM

## 2025-03-05 DIAGNOSIS — R73.03 PREDIABETES: ICD-10-CM

## 2025-03-05 DIAGNOSIS — M25.562 PAIN IN BOTH KNEES, UNSPECIFIED CHRONICITY: Primary | ICD-10-CM

## 2025-03-05 DIAGNOSIS — E55.9 VITAMIN D DEFICIENCY: ICD-10-CM

## 2025-03-05 DIAGNOSIS — E78.49 OTHER HYPERLIPIDEMIA: ICD-10-CM

## 2025-03-05 LAB
25(OH)D3+25(OH)D2 SERPL-MCNC: 40 NG/ML (ref 30–96)
ALBUMIN SERPL BCP-MCNC: 3.9 G/DL (ref 3.5–5.2)
ALBUMIN/CREAT UR: NORMAL UG/MG (ref 0–30)
ALP SERPL-CCNC: 63 U/L (ref 40–150)
ALT SERPL W/O P-5'-P-CCNC: 23 U/L (ref 10–44)
ANION GAP SERPL CALC-SCNC: 14 MMOL/L (ref 8–16)
AST SERPL-CCNC: 25 U/L (ref 10–40)
BILIRUB SERPL-MCNC: 0.5 MG/DL (ref 0.1–1)
BUN SERPL-MCNC: 13 MG/DL (ref 8–23)
CALCIUM SERPL-MCNC: 9 MG/DL (ref 8.7–10.5)
CHLORIDE SERPL-SCNC: 105 MMOL/L (ref 95–110)
CHOLEST SERPL-MCNC: 287 MG/DL (ref 120–199)
CHOLEST/HDLC SERPL: 4.7 {RATIO} (ref 2–5)
CO2 SERPL-SCNC: 19 MMOL/L (ref 23–29)
CREAT SERPL-MCNC: 0.8 MG/DL (ref 0.5–1.4)
CREAT UR-MCNC: 109 MG/DL (ref 15–325)
ERYTHROCYTE [DISTWIDTH] IN BLOOD BY AUTOMATED COUNT: 13.7 % (ref 11.5–14.5)
EST. GFR  (NO RACE VARIABLE): >60 ML/MIN/1.73 M^2
ESTIMATED AVG GLUCOSE: 137 MG/DL (ref 68–131)
GLUCOSE SERPL-MCNC: 108 MG/DL (ref 70–110)
HBA1C MFR BLD: 6.4 % (ref 4–5.6)
HCT VFR BLD AUTO: 40.6 % (ref 37–48.5)
HDLC SERPL-MCNC: 61 MG/DL (ref 40–75)
HDLC SERPL: 21.3 % (ref 20–50)
HGB BLD-MCNC: 13.4 G/DL (ref 12–16)
LDLC SERPL CALC-MCNC: 192.2 MG/DL (ref 63–159)
MCH RBC QN AUTO: 28.6 PG (ref 27–31)
MCHC RBC AUTO-ENTMCNC: 33 G/DL (ref 32–36)
MCV RBC AUTO: 87 FL (ref 82–98)
MICROALBUMIN UR DL<=1MG/L-MCNC: <5 UG/ML
NONHDLC SERPL-MCNC: 226 MG/DL
PLATELET # BLD AUTO: 200 K/UL (ref 150–450)
PMV BLD AUTO: 11.9 FL (ref 9.2–12.9)
POTASSIUM SERPL-SCNC: 4 MMOL/L (ref 3.5–5.1)
PROT SERPL-MCNC: 7.3 G/DL (ref 6–8.4)
RBC # BLD AUTO: 4.68 M/UL (ref 4–5.4)
SODIUM SERPL-SCNC: 138 MMOL/L (ref 136–145)
TRIGL SERPL-MCNC: 169 MG/DL (ref 30–150)
TSH SERPL DL<=0.005 MIU/L-ACNC: 3.14 UIU/ML (ref 0.4–4)
WBC # BLD AUTO: 5.79 K/UL (ref 3.9–12.7)

## 2025-03-05 PROCEDURE — 80061 LIPID PANEL: CPT | Performed by: STUDENT IN AN ORGANIZED HEALTH CARE EDUCATION/TRAINING PROGRAM

## 2025-03-05 PROCEDURE — 82043 UR ALBUMIN QUANTITATIVE: CPT | Performed by: STUDENT IN AN ORGANIZED HEALTH CARE EDUCATION/TRAINING PROGRAM

## 2025-03-05 PROCEDURE — 80053 COMPREHEN METABOLIC PANEL: CPT | Performed by: STUDENT IN AN ORGANIZED HEALTH CARE EDUCATION/TRAINING PROGRAM

## 2025-03-05 PROCEDURE — 84443 ASSAY THYROID STIM HORMONE: CPT | Performed by: STUDENT IN AN ORGANIZED HEALTH CARE EDUCATION/TRAINING PROGRAM

## 2025-03-05 PROCEDURE — 83036 HEMOGLOBIN GLYCOSYLATED A1C: CPT | Performed by: STUDENT IN AN ORGANIZED HEALTH CARE EDUCATION/TRAINING PROGRAM

## 2025-03-05 PROCEDURE — 82306 VITAMIN D 25 HYDROXY: CPT | Performed by: STUDENT IN AN ORGANIZED HEALTH CARE EDUCATION/TRAINING PROGRAM

## 2025-03-05 PROCEDURE — 85027 COMPLETE CBC AUTOMATED: CPT | Performed by: STUDENT IN AN ORGANIZED HEALTH CARE EDUCATION/TRAINING PROGRAM

## 2025-03-06 ENCOUNTER — TELEPHONE (OUTPATIENT)
Dept: FAMILY MEDICINE | Facility: CLINIC | Age: 65
End: 2025-03-06
Payer: MEDICARE

## 2025-03-06 NOTE — TELEPHONE ENCOUNTER
----- Message from Twyla sent at 3/6/2025 10:15 AM CST -----  Contact: self  Type:  Patient Returning CallWho Called:pt Who Left Message for Patient:richmond Does the patient know what this is regarding?:yesWould the patient rather a call back or a response via Instamedianer? callBest Call Back Number:[phone] Information:   Please call back to advise. Thanks!

## 2025-03-08 ENCOUNTER — RESULTS FOLLOW-UP (OUTPATIENT)
Dept: FAMILY MEDICINE | Facility: CLINIC | Age: 65
End: 2025-03-08

## 2025-03-10 ENCOUNTER — OFFICE VISIT (OUTPATIENT)
Dept: FAMILY MEDICINE | Facility: CLINIC | Age: 65
End: 2025-03-10
Payer: MEDICARE

## 2025-03-10 ENCOUNTER — HOSPITAL ENCOUNTER (OUTPATIENT)
Dept: RADIOLOGY | Facility: HOSPITAL | Age: 65
Discharge: HOME | End: 2025-03-10
Attending: ORTHOPAEDIC SURGERY
Payer: MEDICARE

## 2025-03-10 ENCOUNTER — OFFICE VISIT (OUTPATIENT)
Dept: ORTHOPEDICS | Facility: CLINIC | Age: 65
End: 2025-03-10
Payer: MEDICARE

## 2025-03-10 VITALS
DIASTOLIC BLOOD PRESSURE: 68 MMHG | OXYGEN SATURATION: 98 % | SYSTOLIC BLOOD PRESSURE: 110 MMHG | WEIGHT: 132.19 LBS | RESPIRATION RATE: 16 BRPM | HEART RATE: 97 BPM | BODY MASS INDEX: 24.33 KG/M2 | HEIGHT: 62 IN

## 2025-03-10 VITALS — BODY MASS INDEX: 24.14 KG/M2 | HEIGHT: 62 IN | WEIGHT: 131.19 LBS

## 2025-03-10 DIAGNOSIS — Z81.8 FAMILY HISTORY OF DEMENTIA: ICD-10-CM

## 2025-03-10 DIAGNOSIS — E78.49 OTHER HYPERLIPIDEMIA: Primary | ICD-10-CM

## 2025-03-10 DIAGNOSIS — L98.9 SKIN LESION: ICD-10-CM

## 2025-03-10 DIAGNOSIS — M79.645 THUMB PAIN, LEFT: ICD-10-CM

## 2025-03-10 DIAGNOSIS — M65.312 TRIGGER FINGER OF LEFT THUMB: ICD-10-CM

## 2025-03-10 DIAGNOSIS — M65.312 TRIGGER THUMB OF LEFT HAND: Primary | ICD-10-CM

## 2025-03-10 DIAGNOSIS — Z12.11 SCREENING FOR COLON CANCER: ICD-10-CM

## 2025-03-10 DIAGNOSIS — M19.91 PRIMARY OSTEOARTHRITIS, UNSPECIFIED SITE: ICD-10-CM

## 2025-03-10 DIAGNOSIS — R73.03 PREDIABETES: ICD-10-CM

## 2025-03-10 PROCEDURE — 73130 X-RAY EXAM OF HAND: CPT | Mod: TC,PN,LT

## 2025-03-10 PROCEDURE — 99999 PR PBB SHADOW E&M-EST. PATIENT-LVL V: CPT | Mod: PBBFAC,,, | Performed by: STUDENT IN AN ORGANIZED HEALTH CARE EDUCATION/TRAINING PROGRAM

## 2025-03-10 PROCEDURE — 99214 OFFICE O/P EST MOD 30 MIN: CPT | Mod: 25,S$GLB,, | Performed by: ORTHOPAEDIC SURGERY

## 2025-03-10 PROCEDURE — 20550 NJX 1 TENDON SHEATH/LIGAMENT: CPT | Mod: LT,S$GLB,, | Performed by: ORTHOPAEDIC SURGERY

## 2025-03-10 PROCEDURE — 73130 X-RAY EXAM OF HAND: CPT | Mod: 26,LT,, | Performed by: RADIOLOGY

## 2025-03-10 PROCEDURE — 99214 OFFICE O/P EST MOD 30 MIN: CPT | Mod: S$GLB,,, | Performed by: STUDENT IN AN ORGANIZED HEALTH CARE EDUCATION/TRAINING PROGRAM

## 2025-03-10 PROCEDURE — 99999 PR PBB SHADOW E&M-EST. PATIENT-LVL II: CPT | Mod: PBBFAC,,, | Performed by: ORTHOPAEDIC SURGERY

## 2025-03-10 RX ORDER — TRIAMCINOLONE ACETONIDE 40 MG/ML
40 INJECTION, SUSPENSION INTRA-ARTICULAR; INTRAMUSCULAR
Status: DISCONTINUED | OUTPATIENT
Start: 2025-03-10 | End: 2025-03-10 | Stop reason: HOSPADM

## 2025-03-10 RX ORDER — ATORVASTATIN CALCIUM 20 MG/1
20 TABLET, FILM COATED ORAL DAILY
Qty: 90 TABLET | Refills: 3 | Status: SHIPPED | OUTPATIENT
Start: 2025-03-10 | End: 2026-03-10

## 2025-03-10 RX ADMIN — TRIAMCINOLONE ACETONIDE 40 MG: 40 INJECTION, SUSPENSION INTRA-ARTICULAR; INTRAMUSCULAR at 08:03

## 2025-03-10 NOTE — PROCEDURES
Tendon Sheath    Date/Time: 3/10/2025 8:15 AM    Performed by: Irvin Gonzalez MD  Authorized by: Irvin Gonzalez MD    Consent Done?:  Yes (Verbal)  Indications:  Pain  Site marked: the procedure site was marked    Timeout: prior to procedure the correct patient, procedure, and site was verified    Prep: patient was prepped and draped in usual sterile fashion      Local anesthesia used?: Yes    Anesthesia:  Local infiltration  Local anesthetic:  Lidocaine 1% without epinephrine and bupivacaine 0.25% without epinephrine  Anesthetic total (ml):  2    Location:  Thumb  Site:  L thumb flexor tendon sheath  Ultrasonic guidance for needle placement?: No    Needle size:  25 G  Approach:  Volar  Medications:  40 mg triamcinolone acetonide 40 mg/mL  Patient tolerance:  Patient tolerated the procedure well with no immediate complications

## 2025-03-10 NOTE — PROGRESS NOTES
Subjective:      Patient ID: Rosetta Toro is a 64 y.o. female.    Chief Complaint: Pain of the Left Hand (Thumb/)    HPI  The patient presents with a approximate one-month of left thumb pain catching and locking.  She states that at times it is difficult to perform her routine daily activity secondary to pain she has had no treatment for this other than NSAIDs.  Denies trauma.  Denies radiating pain numbness or tingling  ROS      Objective:    Ortho Exam     Constitutional:   Patient is alert  and oriented in no acute distress  HEENT:  normocephalic atraumatic; PERRL EOMI  Neck:  Supple without adenopathy  Cardiovascular:  Normal rate and rhythm  Pulmonary:  Normal respiratory effort normal chest wall expansion  Abdominal:  Nonprotuberant nondistended  Musculoskeletal:  Diffuse tenderness over the base of the left thumb  Positive triggering  Intact flexor and extensor tendon function brisk capillary refill of the digits  Neurological:  No focal defect; cranial nerves 2-12 grossly intact  Psychiatric/behavioral:  Mood and behavior normal      X-Ray Knee 3 View Bilateral  EXAMINATION:  XR KNEE 3 VIEW BILATERAL    CLINICAL HISTORY:  Pain in right knee    TECHNIQUE:  AP, lateral, and Merchant views of both knees were performed.    COMPARISON:  10/30/2023.    FINDINGS:  Mild degenerative osteoarthrosis of the bilateral medial compartments with minimal joint space narrowing and small osteophyte formation.  No significant right or left suprapatellar effusion.  Bilateral quadriceps enthesophyte formation.    Electronically signed by: Catrachito Dolan  Date:    03/03/2025  Time:    10:05       My Radiographs Findings:    Radiographs without acute osseous abnormalities  Assessment:       Encounter Diagnosis   Name Primary?    Trigger thumb of left hand Yes         Plan:       I have discussed medical condition treatment options with her at length.  After a verbal consent and sterile prep I injected her left thumb today  without complication.  We have discussed gentle range-of-motion exercises NSAIDs generalized activity restrictions follow up in 6 weeks if symptoms fail to improve may need to consider surgical release a repeat injection.        Past Medical History:   Diagnosis Date    Chicken pox     Hepatitis A     fatty liver    Hyperlipidemia     Osteoarthritis     Shingles      Past Surgical History:   Procedure Laterality Date    eximer laser      EYE SURGERY  May 31, 2002    Excimer Laser    MYOMECTOMY         Current Medications[1]    Review of patient's allergies indicates:   Allergen Reactions    Meloxicam Hives    Merthiolate (benzalkonium) [benzalkonium chloride]        Family History   Problem Relation Name Age of Onset    Cancer Mother mackenzie zabala     Stomach cancer Mother mackenzie zabala     Breast cancer Neg Hx      Ovarian cancer Neg Hx       Social History     Occupational History    Not on file   Tobacco Use    Smoking status: Former     Current packs/day: 0.00     Types: Cigarettes    Smokeless tobacco: Former   Substance and Sexual Activity    Alcohol use: No    Drug use: No    Sexual activity: Never            [1]   Current Outpatient Medications:     cholecalciferol, vitamin D3, 5,000 unit Tab, Take 5,000 Units by mouth once daily., Disp: , Rfl:     estradioL (ESTRACE) 0.01 % (0.1 mg/gram) vaginal cream, Place a pea-sized amount inside vagina every night for 1 week, and then 2-3 times a week., Disp: 42.5 g, Rfl: 6    fluticasone propionate (FLONASE) 50 mcg/actuation nasal spray, 1 spray (50 mcg total) by Each Nostril route once daily., Disp: 48 mL, Rfl: 2    mupirocin (BACTROBAN) 2 % ointment, Apply 1 gram tid, Disp: 30 g, Rfl: 0    atorvastatin (LIPITOR) 20 MG tablet, Take 1 tablet (20 mg total) by mouth once daily., Disp: 90 tablet, Rfl: 3

## 2025-03-10 NOTE — ASSESSMENT & PLAN NOTE
Lab in [place] 182/77/45/122  She got off of her medication.  She would like to go back on it.

## 2025-03-10 NOTE — PROGRESS NOTES
Subjective:       Patient ID: Rosetta Toro is a 64 y.o. female.    Chief Complaint: Follow-up    History of Present Illness    CHIEF COMPLAINT:  Ms. Toro presents today for follow up of elevated cholesterol    HYPERLIPIDEMIA:  She discontinued atorvastatin 20mg on January 28th due to missed November labs requirement. She reports her triglycerides are higher than ever before.    DIABETES MANAGEMENT:  She self-monitors glucose every two days.    MUSCULOSKELETAL:  She switched from running to walking due to knee problems. She has trigger thumb which was treated with an injection by Dr. Decker today.    DERMATOLOGIC:  She reports a persistent localized pruritic area without visible skin changes that has been present for years. She also notes an area of skin thickening attributed to previous trauma.       Review of Systems   Constitutional:  Negative for activity change and appetite change.   Respiratory:  Negative for shortness of breath.    Cardiovascular:  Negative for chest pain.   Gastrointestinal:  Negative for abdominal pain.   Genitourinary:  Negative for dysuria.   Integumentary:  Negative for rash.   Neurological:  Negative for memory loss.   Psychiatric/Behavioral:  Negative for dysphoric mood and sleep disturbance. The patient is not nervous/anxious.       Problem List[1]  Rosetta has a current medication list which includes the following prescription(s): cholecalciferol (vitamin d3), estradiol, fluticasone propionate, mupirocin, and atorvastatin.        Health Maintenance Due   Topic Date Due    HIV Screening  Never done    Shingles Vaccine (1 of 2) Never done    Pneumococcal Vaccines (Age 50+) (2 of 2 - PPSV23) 05/23/2017    Influenza Vaccine (1) 09/01/2024    COVID-19 Vaccine (3 - 2024-25 season) 09/01/2024    Colorectal Cancer Screening  11/16/2024    Mammogram  03/27/2025      Health Maintenance reviewed and discussed- colon ordered.       Objective:      Physical Exam  Constitutional:        General: She is not in acute distress.     Appearance: Normal appearance. She is not ill-appearing.   Eyes:      Conjunctiva/sclera: Conjunctivae normal.   Cardiovascular:      Rate and Rhythm: Normal rate and regular rhythm.      Heart sounds: Normal heart sounds. No murmur heard.  Pulmonary:      Effort: Pulmonary effort is normal. No respiratory distress.      Breath sounds: Normal breath sounds. No wheezing or rales.   Musculoskeletal:      Right lower leg: No edema.      Left lower leg: No edema.      Comments: Thickened area on left knee   Lymphadenopathy:      Cervical: No cervical adenopathy.   Skin:     General: Skin is warm and dry.   Neurological:      Mental Status: She is alert. Mental status is at baseline.      Gait: Gait normal.   Psychiatric:         Mood and Affect: Mood normal.         Behavior: Behavior normal.         Thought Content: Thought content normal.         Judgment: Judgment normal.             Assessment:       Assessment & Plan    1. Cholesterol levels increased after atorvastatin cessation on January 28th  2. Elevated triglycerides likely due to medication cessation and potential increase in blood sugar  3. A1C slightly elevated but still below 7, indicating pre-diabetic state  4. Persistent localized itching on leg without visible skin changes suggests possible nerve involvement  5. Thick spot on leg likely due to previous injury  6. Arthritis in hands attributed to genetics and age           Plan:       1. Other hyperlipidemia  Assessment & Plan:  Lab in [place] 182/77/45/122  She got off of her medication.  She would like to go back on it.     Orders:  -     atorvastatin (LIPITOR) 20 MG tablet; Take 1 tablet (20 mg total) by mouth once daily.  Dispense: 90 tablet; Refill: 3  -     Lipid Panel; Future; Expected date: 03/10/2025    2. Prediabetes  -     Hemoglobin A1C; Future; Expected date: 03/10/2025    3. Screening for colon cancer  -     Case Request Endoscopy: COLONOSCOPY    4.  Trigger finger of left thumb  Comments:  seeing ortho and had injection    5. Primary osteoarthritis, unspecified site  Assessment & Plan:  Bilateral knee pain      6. Skin lesion  -     Ambulatory referral/consult to Dermatology; Future; Expected date: 03/17/2025    7. Family history of dementia  -     Ambulatory referral/consult to Neurology; Future; Expected date: 03/17/2025         This note was generated with the assistance of ambient listening technology. Verbal consent was obtained by the patient and accompanying visitor(s) for the recording of patient appointment to facilitate this note. I attest to having reviewed and edited the generated note for accuracy, though some syntax or spelling errors may persist. Please contact the author of this note for any clarification.                    [1]   Patient Active Problem List  Diagnosis    Bursitis of right shoulder    Primary osteoarthritis    Uterine fibroid    Neuroma    Hallux valgus of right foot    Prediabetes    Plantar fasciitis    Other hyperlipidemia    Allergic rhinitis    Need for vaccination    Chronic right shoulder pain    Chronic left shoulder pain    Vitamin D deficiency    Osteopenia of multiple sites

## 2025-03-12 ENCOUNTER — OFFICE VISIT (OUTPATIENT)
Dept: OBSTETRICS AND GYNECOLOGY | Facility: CLINIC | Age: 65
End: 2025-03-12
Payer: MEDICARE

## 2025-03-12 VITALS — BODY MASS INDEX: 23.59 KG/M2 | SYSTOLIC BLOOD PRESSURE: 143 MMHG | DIASTOLIC BLOOD PRESSURE: 75 MMHG | WEIGHT: 129 LBS

## 2025-03-12 DIAGNOSIS — Z01.419 WELL WOMAN EXAM WITH ROUTINE GYNECOLOGICAL EXAM: Primary | ICD-10-CM

## 2025-03-12 PROCEDURE — 88175 CYTOPATH C/V AUTO FLUID REDO: CPT | Performed by: OBSTETRICS & GYNECOLOGY

## 2025-03-12 PROCEDURE — 99999 PR PBB SHADOW E&M-EST. PATIENT-LVL III: CPT | Mod: PBBFAC,,, | Performed by: OBSTETRICS & GYNECOLOGY

## 2025-03-12 PROCEDURE — G0101 CA SCREEN;PELVIC/BREAST EXAM: HCPCS | Mod: S$GLB,,, | Performed by: OBSTETRICS & GYNECOLOGY

## 2025-03-12 NOTE — PROGRESS NOTES
CC: Annual check-up    SUBJECTIVE:   64 y.o. female   for annual routine Pap and checkup. No LMP recorded (lmp unknown). Patient is postmenopausal..  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[employer] in [place]     mauro 5 vessel bypass 10/24    Past Medical History:   Diagnosis Date    Chicken pox     Hepatitis A     fatty liver    Hyperlipidemia     Osteoarthritis     Shingles      Past Surgical History:   Procedure Laterality Date    eximer laser      EYE SURGERY  May 31, 2002    Excimer Laser    MYOMECTOMY       Social History[1]  Family History   Problem Relation Name Age of Onset    Cancer Mother mackenzie zabala     Stomach cancer Mother mackenzie zabala     Breast cancer Neg Hx      Ovarian cancer Neg Hx       OB History    Para Term  AB Living   0 0 0 0 0 0   SAB IAB Ectopic Multiple Live Births   0 0 0 0          Current Medications[2]  Allergies: Meloxicam and Merthiolate (benzalkonium) [benzalkonium chloride]     ROS:  Constitutional: no weight loss, weight gain, fever, fatigue  Eyes:  No vision changes, glasses/contacts  ENT/Mouth: No ulcers, sinus problems, ears ringing, headache  Cardiovascular: No inability to lie flat, chest pain, exercise intolerance, swelling, heart palpitations  Respiratory: No wheezing, coughing blood, shortness of breath, or cough  Gastrointestinal: No diarrhea, bloody stool, nausea/vomiting, constipation, gas, hemorrhoids  Genitourinary: No blood in urine, painful urination, urgency of urination, frequency of urination, incomplete emptying, incontinence, abnormal bleeding, painful periods, heavy periods, vaginal discharge, vaginal odor, painful intercourse, sexual problems, bleeding after intercourse.  Musculoskeletal: No muscle weakness  Skin/Breast: No painful breasts, nipple discharge, masses, rash, ulcers  Neurological: No passing out,  seizures, numbness, headache  Endocrine: No diabetes, hypothyroid, hyperthyroid, hot flashes, hair loss, abnormal hair growth, ance  Psychiatric: No depression, crying  Hematologic: No bruises, bleeding, swollen lymph nodes, anemia.      OBJECTIVE:   The patient appears well, alert, oriented x 3, in no distress.  BP (!) 143/75   Wt 58.5 kg (128 lb 15.5 oz)   LMP  (LMP Unknown)   BMI 23.59 kg/m²   NECK: no thyromegaly, trachea midline  SKIN: no acne, striae, hirsutism  BREAST EXAM: breasts appear normal, no suspicious masses, no skin or nipple changes or axillary nodes  ABDOMEN: no hernias, masses, or hepatosplenomegaly  GENITALIA: normal external genitalia, no erythema, no discharge  URETHRA: normal urethra, normal urethral meatus  VAGINA: mucosal atrophy  CERVIX: no lesions or cervical motion tenderness  UTERUS: irregular and post fibroid smaller than had been  ADNEXA: normal adnexa and no mass, fullness, tenderness      ASSESSMENT:   well woman  no contraindication to continue hormonal therapy  1. Well woman exam with routine gynecological exam        PLAN:   [place]amm[place] does in [place]  pap smear  return annually or prn  Orders Placed This Encounter    Liquid-Based Pap Smear, Screening            [1]   Social History  Socioeconomic History    Marital status:    Tobacco Use    Smoking status: Former     Current packs/day: 0.00     Types: Cigarettes    Smokeless tobacco: Former   Substance and Sexual Activity    Alcohol use: No    Drug use: No    Sexual activity: Never     Social Drivers of Health     Financial Resource Strain: Low Risk  (3/4/2025)    Overall Financial Resource Strain (CARDIA)     Difficulty of Paying Living Expenses: Not hard at all   Food Insecurity: No Food Insecurity (3/4/2025)    Hunger Vital Sign     Worried About Running Out of Food in the Last Year: Never true     Ran Out of Food in the Last Year: Never true   Transportation Needs: No Transportation Needs (3/4/2025)    PRAPARE -  Transportation     Lack of Transportation (Medical): No     Lack of Transportation (Non-Medical): No   Physical Activity: Insufficiently Active (3/4/2025)    Exercise Vital Sign     Days of Exercise per Week: 3 days     Minutes of Exercise per Session: 40 min   Stress: No Stress Concern Present (3/4/2025)    [employer] [place] [employer] - Occupational Stress Questionnaire     Feeling of Stress : Not at all   Housing Stability: Low Risk  (3/4/2025)    Housing Stability Vital Sign     Unable to Pay for Housing in the Last Year: No     Number of Times Moved in the Last Year: 0     Homeless in the Last Year: No   [2]   Current Outpatient Medications   Medication Sig Dispense Refill    atorvastatin (LIPITOR) 20 MG tablet Take 1 tablet (20 mg total) by mouth once daily. 90 tablet 3    cholecalciferol, vitamin D3, 5,000 unit Tab Take 5,000 Units by mouth once daily.      estradioL (ESTRACE) 0.01 % (0.1 mg/gram) vaginal cream Place a pea-sized amount inside vagina every night for 1 week, and then 2-3 times a week. 42.5 g 6    fluticasone propionate (FLONASE) 50 mcg/actuation nasal spray 1 spray (50 mcg total) by Each Nostril route once daily. 48 mL 2    mupirocin (BACTROBAN) 2 % ointment Apply 1 gram tid 30 g 0     No current facility-administered medications for this visit.

## 2025-03-13 ENCOUNTER — CLINICAL SUPPORT (OUTPATIENT)
Dept: REHABILITATION | Facility: HOSPITAL | Age: 65
End: 2025-03-13
Attending: ORTHOPAEDIC SURGERY
Payer: MEDICARE

## 2025-03-13 DIAGNOSIS — M70.50 PES ANSERINE BURSITIS: ICD-10-CM

## 2025-03-13 DIAGNOSIS — M25.561 CHRONIC PAIN OF BOTH KNEES: Primary | ICD-10-CM

## 2025-03-13 DIAGNOSIS — M25.562 PAIN IN BOTH KNEES, UNSPECIFIED CHRONICITY: ICD-10-CM

## 2025-03-13 DIAGNOSIS — M25.561 PAIN IN BOTH KNEES, UNSPECIFIED CHRONICITY: ICD-10-CM

## 2025-03-13 DIAGNOSIS — G89.29 CHRONIC PAIN OF BOTH KNEES: Primary | ICD-10-CM

## 2025-03-13 DIAGNOSIS — M25.562 CHRONIC PAIN OF BOTH KNEES: Primary | ICD-10-CM

## 2025-03-13 PROCEDURE — 97140 MANUAL THERAPY 1/> REGIONS: CPT | Mod: PN

## 2025-03-13 PROCEDURE — 97530 THERAPEUTIC ACTIVITIES: CPT | Mod: PN

## 2025-03-13 PROCEDURE — 97161 PT EVAL LOW COMPLEX 20 MIN: CPT | Mod: PN

## 2025-03-13 NOTE — PROGRESS NOTES
Outpatient Rehab    Physical Therapy Visit    Patient Name: Rosetta Toro  MRN: [id number]  YOB: 1960  Encounter Date: 3/13/2025    Therapy Diagnosis:   Encounter Diagnoses   Name Primary?    Pain in both knees, unspecified chronicity     Chronic pain of both knees Yes    Pes anserine bursitis      Physician: Irvin Gonzalez,*    Physician Orders: Eval and Treat  Medical Diagnosis: M25.561,M25.562 (ICD-10-CM) - Pain in both knees, unspecified chronicity     Visit # / Visits Authorized:  1 / 1   Date of Evaluation:  3/13/2025  Insurance Authorization Period: 3/13/2025 to 4/24/2025  Plan of Care Certification:  3/13/2025 to 4/24/2025      Time In: 1330   Time Out: 1425  Total Time: 55   Total Billable Time: 55    FOTO:  Intake Score:  %  Survey Score 1:  %  Survey Score 2:  %         Subjective      Pain reported as 2/10.      Objective   Bracing  Patient presents with a Right and Left knee brace. The knee brace type is Neoprene sleeve.   Only wears when walking for exercise        Ankle/Foot Observations     Pt has mild/moderate flat feet bilaterally. She has arch inserts in her walking shoes but not daily shoes.       Knee Swelling  Location of Measurement Right  (cm) Left  (cm)   20 cm Above Joint Line       10 cm Vastus Medialis Oblique       At Joint Line 35 35   15 cm Below Joint Line                Knee Palpation  Right Knee Palpation  Abnormal: Bony Prominence/Bursa  Right Knee Bony Prominence/Bursa Palpation Observations: Mod. pain with palpation of Pes insertion medially       Left Knee Palpation  Abnormal: Bony Prominence/Bursa  Left Knee Bony Prominence/Bursa Palpation Observations: Mod pain with palpation of Pes insertion medially            Knee Range of Motion   Right Knee   Active (deg) Passive (deg) Pain   Flexion 130       Extension 0           Left Knee   Active (deg) Passive (deg) Pain   Flexion 130       Extension 0           Normal hamstring flexibility  bilaterally.    Ankle/Foot Range of Motion      WNL              Knee Strength   Right Strength Right Pain Left Strength Left  Pain   Flexion (S2) 4+   4+     Prone Flexion           Extension (L3) 4+   4+                   Treatment:  Manual Therapy  Manual Therapy Activity 1: Instrument assisted soft tissue mobilization to bilateral medial aspects of both knees to issa.    Therapeutic Activity  Therapeutic Activity 1: Pt education regarding condition/precautions/treatment/HEP instructions    Assessment & Plan   Assessment:         Patient will continue to benefit from skilled outpatient physical therapy to address the deficits listed in the problem list box on initial evaluation, provide pt/family education and to maximize pt's level of independence in the home and community environment.     Patient's spiritual, cultural, and educational needs considered and patient agreeable to plan of care and goals.           Plan:      Goals:   Active       LTG ( 6weeks)       Decrease worst pain to 2/10       Start:  03/13/25    Expected End:  04/24/25             Normal LE MMT ROM bilaterally       Start:  03/13/25    Expected End:  04/24/25             Improve FOTO score by 50%       Start:  03/13/25    Expected End:  04/24/25            Return to PLF       Start:  03/13/25    Expected End:  04/24/25               STG (3 weeks)       Decrease worst pain to 4/10       Start:  03/13/25    Expected End:  04/03/25            Improve LE MMT by 1/2 grade       Start:  03/13/25    Expected End:  04/03/25            Report compliance with all recommendations       Start:  03/13/25    Expected End:  04/03/25            Improve FOTO score by 25%       Start:  03/13/25    Expected End:  04/03/25                Elvira Ferguson, PT

## 2025-03-17 ENCOUNTER — PATIENT MESSAGE (OUTPATIENT)
Dept: GASTROENTEROLOGY | Facility: CLINIC | Age: 65
End: 2025-03-17
Payer: MEDICARE

## 2025-03-19 ENCOUNTER — CLINICAL SUPPORT (OUTPATIENT)
Dept: REHABILITATION | Facility: HOSPITAL | Age: 65
End: 2025-03-19
Payer: MEDICARE

## 2025-03-19 DIAGNOSIS — M25.562 CHRONIC PAIN OF BOTH KNEES: Primary | ICD-10-CM

## 2025-03-19 DIAGNOSIS — M70.50 PES ANSERINE BURSITIS: ICD-10-CM

## 2025-03-19 DIAGNOSIS — G89.29 CHRONIC PAIN OF BOTH KNEES: Primary | ICD-10-CM

## 2025-03-19 DIAGNOSIS — M25.561 CHRONIC PAIN OF BOTH KNEES: Primary | ICD-10-CM

## 2025-03-19 PROCEDURE — 97112 NEUROMUSCULAR REEDUCATION: CPT | Mod: PN

## 2025-03-19 PROCEDURE — 97110 THERAPEUTIC EXERCISES: CPT | Mod: PN

## 2025-03-19 PROCEDURE — 97530 THERAPEUTIC ACTIVITIES: CPT | Mod: PN

## 2025-03-19 NOTE — PROGRESS NOTES
Outpatient Rehab    Physical Therapy Visit    Patient Name: Rosetta Toro  MRN: [id number]  YOB: 1960  Encounter Date: 3/19/2025    Therapy Diagnosis:   Encounter Diagnoses   Name Primary?    Chronic pain of both knees Yes    Pes anserine bursitis      Physician: Irvin Gonzalez,*    Physician Orders: Eval and Treat  Medical Diagnosis: Pain in both knees, unspecified chronicity    Visit # / Visits Authorized:  1 / 12  Date of Evaluation: 3/13/25  Insurance Authorization Period: 3/19/2025 to 7/30/2025  Plan of Care Certification:  3/13/25 to 4/24/25      PT/PTA:     Number of PTA visits since last PT visit:   Time In: 1530   Time Out: 1623  Total Time: 53   Total Billable Time: 53    FOTO:  Intake Score:  %  Survey Score 1:  %  Survey Score 2:  %         Subjective   Taking ibuprofen and using insoles as instructed at eval made her feel a lot better..  Pain reported as 1/10.      Objective            Treatment:  Therapeutic Exercise  TE 1: Slantboard 3 x 30 sec  TE 2: Seated LAQ bilat;x 20 ea; 0#  TE 3: Supine Hamstring stretch w/ strap; bilat; 3 x 15 sec ea  TE 4: Single leg press; bilat; 20 each; 40#  TE 5: Standing TKE w/ black strap x 20 ea  TE 6: Standing quad stretch 3 x 15 sec ea  TE 7: Seated foot supination w/ band (green) x 15 ea  Balance/Neuromuscular Re-Education  NMR 1: Lateral step up L-2 x 20 each  NMR 2: Heel raises x 20  NMR 3: Gait P/H bilat. 2 laps ea  NMR 4: Wall squat against ball w/ ball at knees x 20  NMR 5: Fwd lunge x 20 ea  Therapeutic Activity  TA 1: Nu-step#8; L-4 x 10 min  TA 2: Fwd step up; L-2; 2 x 10 each    Time Entry(in minutes):  Neuromuscular Re-Education Time Entry: 23  Therapeutic Activity Time Entry: 15  Therapeutic Exercise Time Entry: 15    Assessment & Plan   Assessment: Pt needs VC and physical demonstration to perform the exercises correctly. Exercises modified to avoid pain. Reviewed HEP.       Patient will continue to benefit from skilled  outpatient physical therapy to address the deficits listed in the problem list box on initial evaluation, provide pt/family education and to maximize pt's level of independence in the home and community environment.     Patient's spiritual, cultural, and educational needs considered and patient agreeable to plan of care and goals.           Plan: Cont. POC    Goals:   Active       LTG ( 6weeks)       Decrease worst pain to 2/10       Start:  03/13/25    Expected End:  04/24/25             Normal LE MMT ROM bilaterally       Start:  03/13/25    Expected End:  04/24/25             Improve FOTO score by 50%       Start:  03/13/25    Expected End:  04/24/25            Return to [facility]       Start:  03/13/25    Expected End:  04/24/25               STG (3 weeks)       Decrease worst pain to 4/10       Start:  03/13/25    Expected End:  04/03/25            Improve LE MMT by 1/2 grade       Start:  03/13/25    Expected End:  04/03/25            Report compliance with all recommendations       Start:  03/13/25    Expected End:  04/03/25            Improve FOTO score by 25%       Start:  03/13/25    Expected End:  04/03/25                Elvira Ferguson, PT

## 2025-03-20 ENCOUNTER — CLINICAL SUPPORT (OUTPATIENT)
Dept: REHABILITATION | Facility: HOSPITAL | Age: 65
End: 2025-03-20
Payer: MEDICARE

## 2025-03-20 DIAGNOSIS — M25.561 CHRONIC PAIN OF BOTH KNEES: Primary | ICD-10-CM

## 2025-03-20 DIAGNOSIS — M70.50 PES ANSERINE BURSITIS: ICD-10-CM

## 2025-03-20 DIAGNOSIS — G89.29 CHRONIC PAIN OF BOTH KNEES: Primary | ICD-10-CM

## 2025-03-20 DIAGNOSIS — M25.562 CHRONIC PAIN OF BOTH KNEES: Primary | ICD-10-CM

## 2025-03-20 PROCEDURE — 97530 THERAPEUTIC ACTIVITIES: CPT | Mod: PN,CQ

## 2025-03-20 PROCEDURE — 97110 THERAPEUTIC EXERCISES: CPT | Mod: PN,CQ

## 2025-03-20 PROCEDURE — 97112 NEUROMUSCULAR REEDUCATION: CPT | Mod: PN,CQ

## 2025-03-20 NOTE — PROGRESS NOTES
"  Outpatient Rehab    Physical Therapy Visit    Patient Name: Rosetta Toro  MRN: [id number]  YOB: 1960  Encounter Date: 3/20/2025    Therapy Diagnosis:   Encounter Diagnoses   Name Primary?    Chronic pain of both knees Yes    Pes anserine bursitis      Physician: Irvin Gonzalez,*    Physician Orders: Eval and Treat  Medical Diagnosis: Pain in both knees, unspecified chronicity    Visit # / Visits Authorized:  2 / 12  Date of Evaluation: 3/13/2025  Insurance Authorization Period: 3/19/2025 to 7/30/2025  Plan of Care Certification:  3/13/2025 to 6/13/2025      PT/PTA:     Number of PTA visits since last PT visit:   Time In:   7:00 AM  Time Out:  8:00 AM  Total Time:   60 Minutes  Total Billable Time: 55 Minutes    FOTO:  Intake Score:  %  Survey Score 1:  %  Survey Score 2:  %         Subjective   I had the biggest cramp in the inside of my thighs about three hours after leaving here yesterday.  Pain reported as 1/10. Bilateral Knees    Objective            Treatment:  Therapeutic Exercise  TE 1: Slantboard 3 x 30 sec  TE 2: Seated LAQ bilat;x 20 ea; 0#  TE 3: Supine Hamstring stretch w/ strap; bilat; 3 x 15 sec ea  TE 4: Single leg press; bilat; 20 each; 40#  TE 5: Standing TKE w/ black strap x 20 ea  TE 6: Standing quad stretch 3 x 15 sec ea  TE 7: Seated foot supination w/ band (green) x 15 ea  Balance/Neuromuscular Re-Education  NMR 1: Lateral step up L-2 x 20 each  NMR 2: Heel raises x 20  NMR 3: QS x 2 min  NMR 4: Wall squat against ball w/ ball at knees x 20  NMR 5: Fwd lunge x 20 ea  NMR 6: SLR x 10  NMR 7: SAQ on small gray roll x 2 min  Therapeutic Activity  TA 1: Nu-step#8; L-4 x 15 min  TA 2: Fwd step up; L-2; 2 x 10 each  TA 3: Toe Taps on 6" step x 2 min  TA 4: Standing Hip Flex/Abd/Ext x 15  TA 5: Ball Squeezes x 2 min  TA 6: Bridges x 15    Time Entry(in minutes):       Assessment & Plan   Assessment: Patient did well with exercises; no exacerbations; VC's needed for " technique       Patient will continue to benefit from skilled outpatient physical therapy to address the deficits listed in the problem list box on initial evaluation, provide pt/family education and to maximize pt's level of independence in the home and community environment.     Patient's spiritual, cultural, and educational needs considered and patient agreeable to plan of care and goals.           Plan: Continue per POC and progress with strength and mobility exercises as patient tolerates    Goals:   Active       LTG ( 6weeks)       Decrease worst pain to 2/10 (Progressing)       Start:  03/13/25    Expected End:  04/24/25             Normal LE MMT ROM bilaterally (Progressing)       Start:  03/13/25    Expected End:  04/24/25             Improve FOTO score by 50% (Progressing)       Start:  03/13/25    Expected End:  04/24/25            Return to [facility] (Progressing)       Start:  03/13/25    Expected End:  04/24/25               STG (3 weeks)       Decrease worst pain to 4/10 (Progressing)       Start:  03/13/25    Expected End:  04/03/25            Improve LE MMT by 1/2 grade (Progressing)       Start:  03/13/25    Expected End:  04/03/25            Report compliance with all recommendations (Progressing)       Start:  03/13/25    Expected End:  04/03/25            Improve FOTO score by 25% (Progressing)       Start:  03/13/25    Expected End:  04/03/25                Jonathan Favre, PTA

## 2025-03-25 ENCOUNTER — CLINICAL SUPPORT (OUTPATIENT)
Dept: REHABILITATION | Facility: HOSPITAL | Age: 65
End: 2025-03-25
Payer: MEDICARE

## 2025-03-25 DIAGNOSIS — M70.50 PES ANSERINE BURSITIS: ICD-10-CM

## 2025-03-25 DIAGNOSIS — M25.561 CHRONIC PAIN OF BOTH KNEES: Primary | ICD-10-CM

## 2025-03-25 DIAGNOSIS — G89.29 CHRONIC PAIN OF BOTH KNEES: Primary | ICD-10-CM

## 2025-03-25 DIAGNOSIS — M25.562 CHRONIC PAIN OF BOTH KNEES: Primary | ICD-10-CM

## 2025-03-25 PROCEDURE — 97112 NEUROMUSCULAR REEDUCATION: CPT | Mod: PN

## 2025-03-25 PROCEDURE — 97110 THERAPEUTIC EXERCISES: CPT | Mod: PN

## 2025-03-25 PROCEDURE — 97530 THERAPEUTIC ACTIVITIES: CPT | Mod: PN

## 2025-03-25 NOTE — PROGRESS NOTES
"  Outpatient Rehab    Physical Therapy Visit    Patient Name: Rosetta Toro  MRN: [id number]  YOB: 1960  Encounter Date: 3/25/2025    Therapy Diagnosis:   Encounter Diagnoses   Name Primary?    Chronic pain of both knees Yes    Pes anserine bursitis      Physician: Irvin Gonzalez,*    Physician Orders: Eval and Treat  Medical Diagnosis: Pain in both knees, unspecified chronicity    Visit # / Visits Authorized:  3 / 12  Insurance Authorization Period: 3/19/2025 to 7/30/2025  Date of Evaluation: 3/13/25  Plan of Care Certification: 3/13/25 to 4/24/25     PT/PTA:     Number of PTA visits since last PT visit:   Time In: 1525   Time Out: 1623  Total Time: 58   Total Billable Time: 45     FOTO:  Intake Score:  %  Survey Score 1:  %  Survey Score 2:  %         Subjective   No cramps after last visit. No knee pain right now but had some last night..         Objective            Treatment:  Therapeutic Exercise  TE 1: Slantboard 3 x 30 sec  TE 2: Seated LAQ bilat;x 20 ea; 2#  TE 3: Supine Hamstring stretch w/ strap; bilat; 3 x 15 sec ea  TE 4: Single leg press; bilat; 25 each; 40#  TE 5: Standing TKE w/ black strap x 25 ea  TE 6: Standing quad stretch 3 x 15 sec ea  TE 7: Seated foot supination w/ band (green) x 15 ea  Balance/Neuromuscular Re-Education  NMR 1: Lateral step up L-2 x 25 each  NMR 2: Heel raises x 20  NMR 4: Wall squat against ball w/ ball at knees x 20  NMR 5: Fwd lunge x 20 ea  NMR 6: SLR x 15ea; 2#  NMR 7: SAQ on tan roll x 20ea; 2#  Therapeutic Activity  TA 1: Nu-step#8; L-4 x 15 min  TA 2: Fwd step up; L-2; 2 x 10 each  TA 3: Toe Taps on 6" step x 2 min  TA 4: Standing Hip Flex/Abd/Ext x 10 w/ band (red)  TA 5: Ball Squeezes x 2 min  TA 6: Bridges x 15    Time Entry(in minutes):  Neuromuscular Re-Education Time Entry: 15  Therapeutic Activity Time Entry: 15  Therapeutic Exercise Time Entry: 15    Assessment & Plan   Assessment: Patient did well with exercises; no exacerbations; " VC's needed for technique. Billed time reflects one on one treatment.       Patient will continue to benefit from skilled outpatient physical therapy to address the deficits listed in the problem list box on initial evaluation, provide pt/family education and to maximize pt's level of independence in the home and community environment.     Patient's spiritual, cultural, and educational needs considered and patient agreeable to plan of care and goals.           Plan: Continue per POC and progress with strength and mobility exercises as patient tolerates    Goals:   Active       LTG ( 6weeks)       Decrease worst pain to 2/10 (Progressing)       Start:  03/13/25    Expected End:  04/24/25             Normal LE MMT ROM bilaterally (Progressing)       Start:  03/13/25    Expected End:  04/24/25             Improve FOTO score by 50% (Progressing)       Start:  03/13/25    Expected End:  04/24/25            Return to [facility] (Progressing)       Start:  03/13/25    Expected End:  04/24/25               STG (3 weeks)       Decrease worst pain to 4/10 (Progressing)       Start:  03/13/25    Expected End:  04/03/25            Improve LE MMT by 1/2 grade (Progressing)       Start:  03/13/25    Expected End:  04/03/25            Report compliance with all recommendations (Progressing)       Start:  03/13/25    Expected End:  04/03/25            Improve FOTO score by 25% (Progressing)       Start:  03/13/25    Expected End:  04/03/25                Elvira Ferguson, PT

## 2025-03-28 ENCOUNTER — CLINICAL SUPPORT (OUTPATIENT)
Dept: REHABILITATION | Facility: HOSPITAL | Age: 65
End: 2025-03-28
Payer: MEDICARE

## 2025-03-28 DIAGNOSIS — Z12.31 SCREENING MAMMOGRAM FOR BREAST CANCER: Primary | ICD-10-CM

## 2025-03-28 DIAGNOSIS — M25.561 CHRONIC PAIN OF BOTH KNEES: Primary | ICD-10-CM

## 2025-03-28 DIAGNOSIS — M25.562 CHRONIC PAIN OF BOTH KNEES: Primary | ICD-10-CM

## 2025-03-28 DIAGNOSIS — M70.50 PES ANSERINE BURSITIS: ICD-10-CM

## 2025-03-28 DIAGNOSIS — G89.29 CHRONIC PAIN OF BOTH KNEES: Primary | ICD-10-CM

## 2025-03-28 PROCEDURE — 97110 THERAPEUTIC EXERCISES: CPT | Mod: PN

## 2025-03-28 PROCEDURE — 97530 THERAPEUTIC ACTIVITIES: CPT | Mod: PN

## 2025-03-28 PROCEDURE — 97112 NEUROMUSCULAR REEDUCATION: CPT | Mod: PN

## 2025-03-28 NOTE — PROGRESS NOTES
"  Outpatient Rehab    Physical Therapy Visit    Patient Name: Rosetta Toro  MRN: [id number]  YOB: 1960  Encounter Date: 3/28/2025    Therapy Diagnosis:   Encounter Diagnoses   Name Primary?    Chronic pain of both knees Yes    Pes anserine bursitis      Physician: Irvin Gonzalez,*    Physician Orders: Eval and Treat  Medical Diagnosis: Pain in both knees, unspecified chronicity    Visit # / Visits Authorized:  4 / 12  Insurance Authorization Period: 3/19/2025 to 7/30/2025  Date of Evaluation: 3/13/25  Plan of Care Certification: 3/13/25 to 4/24/25     PT/PTA:     Number of PTA visits since last PT visit:   Time In: 1000   Time Out: 1055  Total Time: 55   Total Billable Time: 40    FOTO:  Intake Score:  %  Survey Score 1:  %  Survey Score 2:  %         Subjective   She tried jogging a short distance and immediately had knee pain. Her knees are sore now..  Pain reported as 2/10. Bilateral Knees    Objective            Treatment:  Therapeutic Exercise  TE 1: Slantboard 3 x 30 sec  TE 2: Seated LAQ bilat;x 20 ea; 2#  TE 3: Supine Hamstring stretch w/ strap; bilat; 3 x 15 sec ea  TE 4: Single leg press; bilat; 25 each; 40#  TE 5: Standing TKE w/ black strap x 25 ea  TE 6: Standing quad stretch 3 x 15 sec ea  TE 7: Seated foot supination w/ band (green) x 15 ea  Balance/Neuromuscular Re-Education  NMR 1: Lateral step up L-2 x 25 each  NMR 2: Heel raises x 20  NMR 4: Wall squat against ball w/ ball at knees x 20  NMR 5: Fwd lunge x 20 ea  NMR 6: SLR x 20ea; 2#  NMR 7: SAQ on tan roll x 20ea; 2#  Therapeutic Activity  TA 1: Nu-step#8; L-4 x 10 min  TA 2: Fwd step up; L-2; 2 x 10 each  TA 3: Toe Taps on 6" step x 2 min  TA 4: Standing Hip Flex/Abd/Ext x 20 w/ band (red)  TA 5: Ball Squeezes x 2 min  TA 6: Bridges x 15    Time Entry(in minutes):  Group Therapy Time Entry: 15  Neuromuscular Re-Education Time Entry: 13  Therapeutic Activity Time Entry: 12  Therapeutic Exercise Time Entry: " 15    Assessment & Plan   Assessment: Patient did well with exercises; no exacerbations; VC's needed for technique. Billed time reflects one on one treatment. Advised pt not to try running at least for a couple more weeks.       Patient will continue to benefit from skilled outpatient physical therapy to address the deficits listed in the problem list box on initial evaluation, provide pt/family education and to maximize pt's level of independence in the home and community environment.     Patient's spiritual, cultural, and educational needs considered and patient agreeable to plan of care and goals.           Plan: Continue per POC and progress with strength and mobility exercises as patient tolerates    Goals:   Active       LTG ( 6weeks)       Decrease worst pain to 2/10 (Progressing)       Start:  03/13/25    Expected End:  04/24/25             Normal LE MMT ROM bilaterally (Progressing)       Start:  03/13/25    Expected End:  04/24/25             Improve FOTO score by 50% (Progressing)       Start:  03/13/25    Expected End:  04/24/25            Return to [facility] (Progressing)       Start:  03/13/25    Expected End:  04/24/25               STG (3 weeks)       Decrease worst pain to 4/10 (Progressing)       Start:  03/13/25    Expected End:  04/03/25            Improve LE MMT by 1/2 grade (Progressing)       Start:  03/13/25    Expected End:  04/03/25            Report compliance with all recommendations (Progressing)       Start:  03/13/25    Expected End:  04/03/25            Improve FOTO score by 25% (Progressing)       Start:  03/13/25    Expected End:  04/03/25                Elvira Ferguson, PT

## 2025-03-31 ENCOUNTER — HOSPITAL ENCOUNTER (OUTPATIENT)
Dept: RADIOLOGY | Facility: HOSPITAL | Age: 65
Discharge: HOME | End: 2025-03-31
Attending: OBSTETRICS & GYNECOLOGY
Payer: MEDICARE

## 2025-03-31 ENCOUNTER — RESULTS FOLLOW-UP (OUTPATIENT)
Dept: FAMILY MEDICINE | Facility: CLINIC | Age: 65
End: 2025-03-31

## 2025-03-31 DIAGNOSIS — Z12.31 SCREENING MAMMOGRAM FOR BREAST CANCER: ICD-10-CM

## 2025-03-31 PROCEDURE — 77063 BREAST TOMOSYNTHESIS BI: CPT | Mod: 26,,, | Performed by: RADIOLOGY

## 2025-03-31 PROCEDURE — 77067 SCR MAMMO BI INCL CAD: CPT | Mod: 26,,, | Performed by: RADIOLOGY

## 2025-03-31 PROCEDURE — 77063 BREAST TOMOSYNTHESIS BI: CPT | Mod: TC

## 2025-04-01 ENCOUNTER — CLINICAL SUPPORT (OUTPATIENT)
Dept: REHABILITATION | Facility: HOSPITAL | Age: 65
End: 2025-04-01
Payer: MEDICARE

## 2025-04-01 DIAGNOSIS — G89.29 CHRONIC PAIN OF BOTH KNEES: Primary | ICD-10-CM

## 2025-04-01 DIAGNOSIS — M70.51 PES ANSERINUS BURSITIS OF BOTH KNEES: ICD-10-CM

## 2025-04-01 DIAGNOSIS — M70.52 PES ANSERINUS BURSITIS OF BOTH KNEES: ICD-10-CM

## 2025-04-01 DIAGNOSIS — M25.561 CHRONIC PAIN OF BOTH KNEES: Primary | ICD-10-CM

## 2025-04-01 DIAGNOSIS — M25.562 CHRONIC PAIN OF BOTH KNEES: Primary | ICD-10-CM

## 2025-04-01 PROCEDURE — 97112 NEUROMUSCULAR REEDUCATION: CPT | Mod: PN

## 2025-04-01 PROCEDURE — 97530 THERAPEUTIC ACTIVITIES: CPT | Mod: PN

## 2025-04-01 PROCEDURE — 97150 GROUP THERAPEUTIC PROCEDURES: CPT | Mod: PN

## 2025-04-01 NOTE — PROGRESS NOTES
Outpatient Rehab    Physical Therapy Visit    Patient Name: Rosetta Toro  MRN: [id number]  YOB: 1960  Encounter Date: 4/1/2025    Therapy Diagnosis:   Encounter Diagnoses   Name Primary?    Chronic pain of both knees Yes    Pes anserinus bursitis of both knees      Physician: Irvin Gonzalez,*    Physician Orders: Eval and Treat  Medical Diagnosis: Pain in both knees, unspecified chronicity    Visit # / Visits Authorized:  5 / 12  Insurance Authorization Period: 3/19/2025 to 7/30/2025  Date of Evaluation: 3/13/25  Plan of Care Certification: 3/13/25 to 4/24/25     PT/PTA:     Number of PTA visits since last PT visit: 6  Time In: 0900   Time Out: 0958  Total Time: 58   Total Billable Time: 40; split billing    FOTO:  Intake Score:  %  Survey Score 1:  %  Survey Score 2:  %         Subjective   Took ibuprofen over the weekend and has less pain today. Has not tried running anymore..         Objective            Treatment:  Therapeutic Exercise  TE 1: Slantboard 3 x 30 sec  TE 2: Seated LAQ bilat;x 20 ea; 3#  TE 3: Supine Hamstring stretch w/ strap; bilat; 3 x 15 sec ea  TE 4: Single leg press; bilat; 25 each; 40#  TE 5: Standing TKE w/ black strap x 25 ea  TE 6: Standing quad stretch 3 x 15 sec ea  TE 7: Seated foot supination w/ band (green) x 20 ea  Balance/Neuromuscular Re-Education  NMR 1: Lateral step up L-2 x 25 each  NMR 2: Heel raises x 20  NMR 4: Wall squat against ball w/ ball at knees x 20  NMR 5: Fwd lunge x 20 ea  NMR 6: SLR x 25ea; 2#  NMR 7: SAQ on tan roll x 25ea; 2#  Therapeutic Activity  TA 1: Nu-step#8; L-4 x 10 min  TA 2: Fwd step up; L-2; 2 x 10 each  TA 3: Eccentric step down L-1 x 20 bilat.  TA 4: Standing Hip Flex/Abd/Ext x 20 w/ band (red)  TA 6: Bridges x 15    Time Entry(in minutes):  Group Therapy Time Entry: 15  Neuromuscular Re-Education Time Entry: 13  Therapeutic Activity Time Entry: 12  Therapeutic Exercise Time Entry: 18    Assessment & Plan   Assessment:  Patient did well with exercises; no exacerbations; VC's needed for technique. Billed time reflects one on one treatment.       Patient will continue to benefit from skilled outpatient physical therapy to address the deficits listed in the problem list box on initial evaluation, provide pt/family education and to maximize pt's level of independence in the home and community environment.     Patient's spiritual, cultural, and educational needs considered and patient agreeable to plan of care and goals.           Plan: Continue per POC and progress with strength and mobility exercises as patient tolerates    Goals:   Active       LTG ( 6weeks)       Decrease worst pain to 2/10 (Progressing)       Start:  03/13/25    Expected End:  04/24/25             Normal LE MMT ROM bilaterally (Progressing)       Start:  03/13/25    Expected End:  04/24/25             Improve FOTO score by 50% (Progressing)       Start:  03/13/25    Expected End:  04/24/25            Return to [facility] (Progressing)       Start:  03/13/25    Expected End:  04/24/25               STG (3 weeks)       Decrease worst pain to 4/10 (Progressing)       Start:  03/13/25    Expected End:  04/03/25            Improve LE MMT by 1/2 grade (Progressing)       Start:  03/13/25    Expected End:  04/03/25            Report compliance with all recommendations (Progressing)       Start:  03/13/25    Expected End:  04/03/25            Improve FOTO score by 25% (Progressing)       Start:  03/13/25    Expected End:  04/03/25                Elvira Ferguson, PT

## 2025-04-03 ENCOUNTER — CLINICAL SUPPORT (OUTPATIENT)
Dept: REHABILITATION | Facility: HOSPITAL | Age: 65
End: 2025-04-03
Payer: MEDICARE

## 2025-04-03 DIAGNOSIS — M70.52 PES ANSERINUS BURSITIS OF BOTH KNEES: ICD-10-CM

## 2025-04-03 DIAGNOSIS — M25.561 CHRONIC PAIN OF BOTH KNEES: Primary | ICD-10-CM

## 2025-04-03 DIAGNOSIS — M25.562 CHRONIC PAIN OF BOTH KNEES: Primary | ICD-10-CM

## 2025-04-03 DIAGNOSIS — M70.51 PES ANSERINUS BURSITIS OF BOTH KNEES: ICD-10-CM

## 2025-04-03 DIAGNOSIS — G89.29 CHRONIC PAIN OF BOTH KNEES: Primary | ICD-10-CM

## 2025-04-03 PROCEDURE — 97530 THERAPEUTIC ACTIVITIES: CPT | Mod: PN

## 2025-04-03 PROCEDURE — 97150 GROUP THERAPEUTIC PROCEDURES: CPT | Mod: PN

## 2025-04-03 PROCEDURE — 97112 NEUROMUSCULAR REEDUCATION: CPT | Mod: PN

## 2025-04-03 NOTE — PROGRESS NOTES
Outpatient Rehab    Physical Therapy Visit    Patient Name: Rosetta Toro  MRN: [id number]  YOB: 1960  Encounter Date: 4/3/2025    Therapy Diagnosis:   Encounter Diagnoses   Name Primary?    Chronic pain of both knees Yes    Pes anserinus bursitis of both knees      Physician: Irvin Gonzalez,*    Physician Orders: Eval and Treat  Medical Diagnosis: Pain in both knees, unspecified chronicity    Visit # / Visits Authorized:  6 / 12  Insurance Authorization Period: 3/19/2025 to 7/30/2025  Date of Evaluation: 3/13/25  Plan of Care Certification: 3/13/25 to 4/24/25     PT/PTA:     Number of PTA visits since last PT visit: 7  Time In: 0900   Time Out: 1000  Total Time: 60   Total Billable Time: 40; split billing    FOTO:  Intake Score:  %  Survey Score 1:  %  Survey Score 2:  %         Subjective   Still has some mild pain but notices improved flexibility. She can do her exercises but the more intense she does, the more the pain increases..         Objective            Treatment:  Therapeutic Exercise  TE 1: Slantboard 3 x 30 sec  TE 2: Seated LAQ bilat;x 25 ea; 3#  TE 3: Supine Hamstring stretch w/ strap; bilat; 3 x 15 sec ea  TE 4: Single leg press; bilat; 30 each; 40#  TE 5: Standing TKE w/ black strap x 25 ea  TE 6: Standing quad stretch 3 x 15 sec ea  TE 7: Seated foot supination w/ band (green) x 25 ea  TE 8: Bilat. IT band stretch 3 x 15 sec  Balance/Neuromuscular Re-Education  NMR 1: Lateral step up L-2 x 25 each  NMR 2: Heel raises x 25  NMR 4: Wall squat against ball w/ ball at knees x 25  NMR 5: Fwd lunge x 20 ea  NMR 6: SLR x 20ea; 3#  NMR 7: SAQ on tan roll x 25ea; 3#  Therapeutic Activity  TA 1: Nu-step#8; L-4 x 10 min  TA 2: Fwd step up; L-2; x25 each  TA 3: Eccentric step down L-1 x 20 bilat.  TA 4: Standing Hip Flex/Abd/Ext x 20 w/ band (red)  TA 5: Ball Squeezes x 2 min    Time Entry(in minutes):  Group Therapy Time Entry: 15  Neuromuscular Re-Education Time Entry:  10  Therapeutic Activity Time Entry: 15  Therapeutic Exercise Time Entry: 20    Assessment & Plan   Assessment: Patient did well with exercises; no exacerbations; VC's needed for technique. Billed time reflects one on one treatment. Educated pt to use ice after workouts at home.       Patient will continue to benefit from skilled outpatient physical therapy to address the deficits listed in the problem list box on initial evaluation, provide pt/family education and to maximize pt's level of independence in the home and community environment.     Patient's spiritual, cultural, and educational needs considered and patient agreeable to plan of care and goals.           Plan: Continue per POC and progress with strength and mobility exercises as patient tolerates    Goals:   Active       LTG ( 6weeks)       Decrease worst pain to 2/10 (Progressing)       Start:  03/13/25    Expected End:  04/24/25             Normal LE MMT ROM bilaterally (Progressing)       Start:  03/13/25    Expected End:  04/24/25             Improve FOTO score by 50% (Progressing)       Start:  03/13/25    Expected End:  04/24/25            Return to [facility] (Progressing)       Start:  03/13/25    Expected End:  04/24/25               STG (3 weeks)       Decrease worst pain to 4/10 (Progressing)       Start:  03/13/25    Expected End:  04/03/25            Improve LE MMT by 1/2 grade (Progressing)       Start:  03/13/25    Expected End:  04/03/25            Report compliance with all recommendations (Progressing)       Start:  03/13/25    Expected End:  04/03/25            Improve FOTO score by 25% (Progressing)       Start:  03/13/25    Expected End:  04/03/25                Elvira Ferguson, PT

## 2025-04-08 ENCOUNTER — PATIENT MESSAGE (OUTPATIENT)
Dept: ADMINISTRATIVE | Facility: HOSPITAL | Age: 65
End: 2025-04-08
Payer: MEDICARE

## 2025-04-08 ENCOUNTER — CLINICAL SUPPORT (OUTPATIENT)
Dept: REHABILITATION | Facility: HOSPITAL | Age: 65
End: 2025-04-08
Payer: MEDICARE

## 2025-04-08 DIAGNOSIS — M70.52 PES ANSERINUS BURSITIS OF BOTH KNEES: ICD-10-CM

## 2025-04-08 DIAGNOSIS — G89.29 CHRONIC PAIN OF BOTH KNEES: Primary | ICD-10-CM

## 2025-04-08 DIAGNOSIS — M70.51 PES ANSERINUS BURSITIS OF BOTH KNEES: ICD-10-CM

## 2025-04-08 DIAGNOSIS — M25.562 CHRONIC PAIN OF BOTH KNEES: Primary | ICD-10-CM

## 2025-04-08 DIAGNOSIS — M25.561 CHRONIC PAIN OF BOTH KNEES: Primary | ICD-10-CM

## 2025-04-08 PROCEDURE — 97150 GROUP THERAPEUTIC PROCEDURES: CPT | Mod: PN

## 2025-04-08 PROCEDURE — 97530 THERAPEUTIC ACTIVITIES: CPT | Mod: PN

## 2025-04-08 PROCEDURE — 97112 NEUROMUSCULAR REEDUCATION: CPT | Mod: PN

## 2025-04-08 NOTE — PROGRESS NOTES
Outpatient Rehab    Physical Therapy Visit    Patient Name: Rosetta Toro  MRN: [id number]  YOB: 1960  Encounter Date: 4/8/2025    Therapy Diagnosis:   Encounter Diagnoses   Name Primary?    Chronic pain of both knees Yes    Pes anserinus bursitis of both knees      Physician: Irvin Gonzalez,*    Physician Orders: Eval and Treat  Medical Diagnosis: Pain in both knees, unspecified chronicity    Visit # / Visits Authorized:  7 / 12  Insurance Authorization Period: 3/19/2025 to 7/30/2025  Date of Evaluation: 3/13/25  Plan of Care Certification: 3/13/25 to 4/24/25     PT/PTA:     Number of PTA visits since last PT visit: 8  Time In: 0800   Time Out: 0855  Total Time: 55   Total Billable Time: 40, split billing      FOTO:  Intake Score:  %  Survey Score 1:  %  Survey Score 2:  %         Subjective   Doing all of the exercises and sees improvement but still gets some knee pain at times.    Medail aspect of both knees, pes anserine insertion    Objective            Treatment:  Therapeutic Exercise  TE 1: Slantboard 3 x 30 sec  TE 2: Seated LAQ bilat;x 25 ea; 3#  TE 3: Supine Hamstring stretch w/ strap; bilat; 3 x 15 sec ea  TE 4: Single leg press; bilat; 30 each; 40#  TE 5: Standing TKE w/ black strap x 25 ea  TE 6: Standing quad stretch 3 x 15 sec ea  TE 7: Seated foot supination w/ band (green) x 25 ea  TE 8: Bilat. IT band stretch 3 x 15 sec  Balance/Neuromuscular Re-Education  NMR 1: Lateral step up L-2 x 25 each  NMR 2: Heel raises x 25  NMR 4: Wall squat against ball w/ ball at knees x 25  NMR 5: Fwd lunge x 20 ea  NMR 6: SLR x 20ea; 3#  NMR 7: SAQ on tan roll x 25ea; 3#  Therapeutic Activity  TA 1: Nu-step#8; L-4 x 10 min  TA 2: Fwd step up; L-2; x25 each  TA 3: Eccentric step down L-1 x 20 bilat.  TA 4: Standing Hip Flex/Abd/Ext x 20 w/ band (red)  TA 5: Ball Squeezes x 2 min    Time Entry(in minutes):  Group Therapy Time Entry: 15  Neuromuscular Re-Education Time Entry: 10  Therapeutic  Activity Time Entry: 15  Therapeutic Exercise Time Entry: 20    Assessment & Plan   Assessment: Patient did well with exercises; no exacerbations; VC's needed for technique. Billed time reflects one on one treatment.       Patient will continue to benefit from skilled outpatient physical therapy to address the deficits listed in the problem list box on initial evaluation, provide pt/family education and to maximize pt's level of independence in the home and community environment.     Patient's spiritual, cultural, and educational needs considered and patient agreeable to plan of care and goals.           Plan: Continue per POC and progress with strength and mobility exercises as patient tolerates    Goals:   Active       LTG ( 6weeks)       Decrease worst pain to 2/10 (Progressing)       Start:  03/13/25    Expected End:  04/24/25             Normal LE MMT ROM bilaterally (Progressing)       Start:  03/13/25    Expected End:  04/24/25             Improve FOTO score by 50% (Progressing)       Start:  03/13/25    Expected End:  04/24/25            Return to PLF (Progressing)       Start:  03/13/25    Expected End:  04/24/25               STG (3 weeks)       Decrease worst pain to 4/10 (Progressing)       Start:  03/13/25    Expected End:  04/03/25            Improve LE MMT by 1/2 grade (Progressing)       Start:  03/13/25    Expected End:  04/03/25            Report compliance with all recommendations (Progressing)       Start:  03/13/25    Expected End:  04/03/25            Improve FOTO score by 25% (Progressing)       Start:  03/13/25    Expected End:  04/03/25                Elvira Ferguson, PT

## 2025-04-09 ENCOUNTER — PATIENT OUTREACH (OUTPATIENT)
Dept: ADMINISTRATIVE | Facility: HOSPITAL | Age: 65
End: 2025-04-09
Payer: MEDICARE

## 2025-04-09 DIAGNOSIS — Z12.11 SCREEN FOR COLON CANCER: Primary | ICD-10-CM

## 2025-04-09 NOTE — PROGRESS NOTES
Population Health Chart Review & Patient Outreach Details      Additional [facility] Health Notes:               Updates Requested / Reviewed:      Updated Care Coordination Note, Care Everywhere, , and Immunizations Reconciliation Completed or Queried: [facility] Topics Overdue:      [facility] Score: 1     Colon Cancer Screening    Influenza Vaccine  Pneumonia Vaccine  Shingles/Zoster Vaccine                  Health Maintenance Topic(s) Outreach Outcomes & Actions Taken:    Colorectal Cancer Screening - Outreach Outcomes & Actions Taken  : Colonoscopy Case Request / Referral / Home Test Order Placed - Cologuard order placed

## 2025-04-09 NOTE — PROGRESS NOTES
Spoke to Sonido at [facility]. Order canceled, referral placed to Dr Sal Florence to complete Colonoscopy per patient request.

## 2025-04-10 ENCOUNTER — CLINICAL SUPPORT (OUTPATIENT)
Dept: REHABILITATION | Facility: HOSPITAL | Age: 65
End: 2025-04-10
Payer: MEDICARE

## 2025-04-10 DIAGNOSIS — M70.52 PES ANSERINUS BURSITIS OF BOTH KNEES: ICD-10-CM

## 2025-04-10 DIAGNOSIS — M25.562 CHRONIC PAIN OF BOTH KNEES: Primary | ICD-10-CM

## 2025-04-10 DIAGNOSIS — M25.561 CHRONIC PAIN OF BOTH KNEES: Primary | ICD-10-CM

## 2025-04-10 DIAGNOSIS — G89.29 CHRONIC PAIN OF BOTH KNEES: Primary | ICD-10-CM

## 2025-04-10 DIAGNOSIS — M70.51 PES ANSERINUS BURSITIS OF BOTH KNEES: ICD-10-CM

## 2025-04-10 PROCEDURE — 97150 GROUP THERAPEUTIC PROCEDURES: CPT | Mod: PN

## 2025-04-10 PROCEDURE — 97112 NEUROMUSCULAR REEDUCATION: CPT | Mod: PN

## 2025-04-10 PROCEDURE — 97530 THERAPEUTIC ACTIVITIES: CPT | Mod: PN

## 2025-04-10 NOTE — PROGRESS NOTES
Outpatient Rehab    Physical Therapy Visit    Patient Name: Rosetta Toro  MRN: [id number]  YOB: 1960  Encounter Date: 4/10/2025    Therapy Diagnosis:   Encounter Diagnoses   Name Primary?    Chronic pain of both knees Yes    Pes anserinus bursitis of both knees      Physician: Irvin Gonzalez,*    Physician Orders: Eval and Treat  Medical Diagnosis: Pain in both knees, unspecified chronicity    Visit # / Visits Authorized:  8 / 12  Insurance Authorization Period: 3/19/2025 to 7/30/2025  Date of Evaluation: 3/13/25  Plan of Care Certification: 3/13/25 to 4/24/25     PT/PTA:     Number of PTA visits since last PT visit: 8  Time In: 0800   Time Out: 0855  Total Time: 55   Total Billable Time: 40; split billing    FOTO:  Intake Score:  %  Survey Score 1:  %  Survey Score 2:  %         Subjective   Says she seems to feel the same but no worse..         Objective            Treatment:  Therapeutic Exercise  TE 1: Slantboard 3 x 30 sec  TE 2: Seated LAQ bilat;x 25 ea; 3#  TE 3: Supine Hamstring stretch w/ strap; bilat; 3 x 15 sec ea  TE 4: Single leg press; bilat; 30 each; 40#  TE 5: Standing TKE w/ black strap x 25 ea  TE 6: Standing quad stretch 3 x 15 sec ea  TE 7: Seated foot supination w/ band (green) x 25 ea  TE 8: Bilat. IT band stretch 3 x 15 sec  Balance/Neuromuscular Re-Education  NMR 1: Lateral step up L-2 x 25 each  NMR 2: Heel raises x 25  NMR 4: Wall squat against ball w/ ball at knees x 25  NMR 5: Fwd lunge x 20 ea  NMR 6: SLR x 25ea; 3#  NMR 7: SAQ on tan roll x 25ea; 3#  Therapeutic Activity  TA 1: Nu-step#8; L-4 x 10 min  TA 2: Fwd step up; L-2; x30 each  TA 3: Eccentric step down L-1 x 20 bilat.  TA 4: Standing Hip Flex/Abd/Ext x 10 w/ band (green)  TA 5: Seated diagonal SLR w/ ball at knees x 20; bilat.; 0#    Time Entry(in minutes):  Group Therapy Time Entry: 15  Neuromuscular Re-Education Time Entry: 10  Therapeutic Activity Time Entry: 15  Therapeutic Exercise Time Entry:  20    Assessment & Plan   Assessment: Patient did well with exercises; no exacerbations; VC's needed for technique. Billed time reflects one on one treatment. Advancing as tolerated.       Patient will continue to benefit from skilled outpatient physical therapy to address the deficits listed in the problem list box on initial evaluation, provide pt/family education and to maximize pt's level of independence in the home and community environment.     Patient's spiritual, cultural, and educational needs considered and patient agreeable to plan of care and goals.           Plan: Continue per POC and progress with strength and mobility exercises as patient tolerates    Goals:   Active       LTG ( 6weeks)       Decrease worst pain to 2/10 (Progressing)       Start:  03/13/25    Expected End:  04/24/25             Normal LE MMT ROM bilaterally (Progressing)       Start:  03/13/25    Expected End:  04/24/25             Improve FOTO score by 50% (Progressing)       Start:  03/13/25    Expected End:  04/24/25            Return to PLF (Progressing)       Start:  03/13/25    Expected End:  04/24/25               STG (3 weeks)       Decrease worst pain to 4/10 (Progressing)       Start:  03/13/25    Expected End:  04/03/25            Improve LE MMT by 1/2 grade (Progressing)       Start:  03/13/25    Expected End:  04/03/25            Report compliance with all recommendations (Progressing)       Start:  03/13/25    Expected End:  04/03/25            Improve FOTO score by 25% (Progressing)       Start:  03/13/25    Expected End:  04/03/25                Elvira Ferguson, PT

## 2025-04-15 ENCOUNTER — CLINICAL SUPPORT (OUTPATIENT)
Dept: REHABILITATION | Facility: HOSPITAL | Age: 65
End: 2025-04-15
Payer: MEDICARE

## 2025-04-15 DIAGNOSIS — M25.562 CHRONIC PAIN OF BOTH KNEES: Primary | ICD-10-CM

## 2025-04-15 DIAGNOSIS — M70.51 PES ANSERINUS BURSITIS OF BOTH KNEES: ICD-10-CM

## 2025-04-15 DIAGNOSIS — G89.29 CHRONIC PAIN OF BOTH KNEES: Primary | ICD-10-CM

## 2025-04-15 DIAGNOSIS — M70.52 PES ANSERINUS BURSITIS OF BOTH KNEES: ICD-10-CM

## 2025-04-15 DIAGNOSIS — M25.561 CHRONIC PAIN OF BOTH KNEES: Primary | ICD-10-CM

## 2025-04-15 PROCEDURE — 97530 THERAPEUTIC ACTIVITIES: CPT | Mod: PN

## 2025-04-15 PROCEDURE — 97112 NEUROMUSCULAR REEDUCATION: CPT | Mod: PN

## 2025-04-15 PROCEDURE — 97110 THERAPEUTIC EXERCISES: CPT | Mod: PN

## 2025-04-15 NOTE — PROGRESS NOTES
Outpatient Rehab    Physical Therapy Visit    Patient Name: Rosetta Toro  MRN: [id number]  YOB: 1960  Encounter Date: 4/15/2025    Therapy Diagnosis:   Encounter Diagnoses   Name Primary?    Chronic pain of both knees Yes    Pes anserinus bursitis of both knees      Physician: Irvin Gonzalez,*    Physician Orders: Eval and Treat  Medical Diagnosis: Pain in both knees, unspecified chronicity    Visit # / Visits Authorized:  9 / 12  Insurance Authorization Period: 3/19/2025 to 7/30/2025  Date of Evaluation: 3/13/25  Plan of Care Certification: 3/13/25 to 4/24/25     PT/PTA:     Number of PTA visits since last PT visit:   Time In: 0900   Time Out: 0955  Total Time: 55   Total Billable Time: 55    FOTO:  Intake Score:  %  Survey Score 1:  %  Survey Score 2:  %         Subjective   She says overall she is doing well but has resolved that she will just have to live with a little bit of pain.         Objective            Treatment:  Therapeutic Exercise  TE 1: Slantboard 3 x 30 sec  TE 2: Seated LAQ bilat;x 30 ea; 3#  TE 3: Supine Hamstring stretch w/ strap; bilat; 3 x 15 sec ea  TE 4: Single leg press; bilat; 20 each; 50#  TE 5: Standing TKE w/ black strap x 25 ea  TE 6: Standing quad stretch 3 x 15 sec ea  TE 7: Seated foot supination w/ band (green) x 25 ea  TE 8: Bilat. IT band stretch 3 x 15 sec  Balance/Neuromuscular Re-Education  NMR 1: Lateral step up L-2 x 25 each  NMR 2: Heel raises x 25  NMR 4: Wall squat against ball w/ ball at knees x 25  NMR 5: Fwd lunge x 25 ea  NMR 6: SLR x 25ea; 3#  NMR 7: SAQ on tan roll x 25ea; 3#  Therapeutic Activity  TA 1: Nu-step#8; L-4 x 10 min  TA 2: Fwd step up; L-2; x30 each  TA 3: Eccentric step down L-1 x 25 bilat.  TA 4: Standing Hip Flex/Abd/Ext x 10 w/ band (green)  TA 5: Seated diagonal SLR w/ ball at knees x 20; bilat.; 0#    Time Entry(in minutes):  Neuromuscular Re-Education Time Entry: 15  Therapeutic Activity Time Entry: 25  Therapeutic  Exercise Time Entry: 15    Assessment & Plan   Assessment: Patient did well with exercises; no exacerbations; VC's needed for technique. Began d/c planning       Patient will continue to benefit from skilled outpatient physical therapy to address the deficits listed in the problem list box on initial evaluation, provide pt/family education and to maximize pt's level of independence in the home and community environment.     Patient's spiritual, cultural, and educational needs considered and patient agreeable to plan of care and goals.           Plan: Continue per POC and progress with strength and mobility exercises as patient tolerates    Goals:   Active       LTG ( 6weeks)       Decrease worst pain to 2/10 (Progressing)       Start:  03/13/25    Expected End:  04/24/25             Normal LE MMT ROM bilaterally (Progressing)       Start:  03/13/25    Expected End:  04/24/25             Improve FOTO score by 50% (Progressing)       Start:  03/13/25    Expected End:  04/24/25            Return to PLF (Progressing)       Start:  03/13/25    Expected End:  04/24/25               STG (3 weeks)       Decrease worst pain to 4/10 (Progressing)       Start:  03/13/25    Expected End:  04/03/25            Improve LE MMT by 1/2 grade (Progressing)       Start:  03/13/25    Expected End:  04/03/25            Report compliance with all recommendations (Progressing)       Start:  03/13/25    Expected End:  04/03/25            Improve FOTO score by 25% (Progressing)       Start:  03/13/25    Expected End:  04/03/25                Elvira Ferguson, PT

## 2025-04-17 ENCOUNTER — CLINICAL SUPPORT (OUTPATIENT)
Dept: REHABILITATION | Facility: HOSPITAL | Age: 65
End: 2025-04-17
Payer: MEDICARE

## 2025-04-17 DIAGNOSIS — M70.52 PES ANSERINUS BURSITIS OF BOTH KNEES: ICD-10-CM

## 2025-04-17 DIAGNOSIS — M70.51 PES ANSERINUS BURSITIS OF BOTH KNEES: ICD-10-CM

## 2025-04-17 DIAGNOSIS — M25.562 CHRONIC PAIN OF BOTH KNEES: Primary | ICD-10-CM

## 2025-04-17 DIAGNOSIS — M25.561 CHRONIC PAIN OF BOTH KNEES: Primary | ICD-10-CM

## 2025-04-17 DIAGNOSIS — G89.29 CHRONIC PAIN OF BOTH KNEES: Primary | ICD-10-CM

## 2025-04-17 PROCEDURE — 97110 THERAPEUTIC EXERCISES: CPT | Mod: PN

## 2025-04-17 PROCEDURE — 97112 NEUROMUSCULAR REEDUCATION: CPT | Mod: PN

## 2025-04-17 PROCEDURE — 97530 THERAPEUTIC ACTIVITIES: CPT | Mod: PN

## 2025-04-17 NOTE — PROGRESS NOTES
Outpatient Rehab    Physical Therapy Visit    Patient Name: Roestta Toro  MRN: [id number]  YOB: 1960  Encounter Date: 4/17/2025    Therapy Diagnosis:   Encounter Diagnoses   Name Primary?    Chronic pain of both knees Yes    Pes anserinus bursitis of both knees      Physician: Irvin Gonzalez,*    Physician Orders: Eval and Treat  Medical Diagnosis: Pain in both knees, unspecified chronicity    Visit # / Visits Authorized:  10 / 12  Insurance Authorization Period: 3/19/2025 to 7/30/2025  Date of Evaluation: 3/13/25  Plan of Care Certification: 3/13/25 to 4/24/25     PT/PTA:     Number of PTA visits since last PT visit:   Time In: 0800   Time Out: 0855  Total Time: 55   Total Billable Time: 55    FOTO:  Intake Score:  %  Survey Score 1:  %  Survey Score 2:  %         Subjective   She did a lot of cleaning yesterday for a long time and does not have increased pain. She is surprised that it didn't bother her..         Objective            Treatment:  Therapeutic Exercise  TE 1: Slantboard 3 x 30 sec  TE 2: Seated LAQ bilat;x 20 ea; 4#  TE 3: Supine Hamstring stretch w/ strap; bilat; 3 x 15 sec ea  TE 4: Single leg press; bilat; 25 each; 50#  TE 5: Standing TKE w/ black strap x 25 ea  TE 6: Standing quad stretch 3 x 15 sec ea  TE 7: Seated foot supination w/ band (green) x 25 ea  TE 8: Bilat. IT band stretch 3 x 15 sec  Balance/Neuromuscular Re-Education  NMR 1: Lateral step up L-2 x 25 each  NMR 2: Heel raises x 25  NMR 4: Wall squat against ball w/ ball at knees x 25  NMR 5: Fwd lunge x 25 ea  NMR 6: SLR x 20ea; 4#  NMR 7: SAQ on tan roll x 20ea; 4#  Therapeutic Activity  TA 1: Nu-step#8; L-4 x 10 min  TA 2: Fwd step up; L-2; x30 each  TA 3: Eccentric step down L-1 x 25 bilat.  TA 4: Standing Hip Flex/Abd/Ext x 10 w/ band (green)  TA 5: Seated diagonal SLR w/ ball at knees x 25; bilat.; 0#    Time Entry(in minutes):  Neuromuscular Re-Education Time Entry: 15  Therapeutic Activity Time Entry:  25  Therapeutic Exercise Time Entry: 15    Assessment & Plan   Assessment: Patient did well with exercises; no exacerbations; VC's needed for technique.       Patient will continue to benefit from skilled outpatient physical therapy to address the deficits listed in the problem list box on initial evaluation, provide pt/family education and to maximize pt's level of independence in the home and community environment.     Patient's spiritual, cultural, and educational needs considered and patient agreeable to plan of care and goals.           Plan: Continue per POC and progress with strength and mobility exercises as patient tolerates    Goals:   Active       LTG ( 6weeks)       Decrease worst pain to 2/10 (Progressing)       Start:  03/13/25    Expected End:  04/24/25             Normal LE MMT ROM bilaterally (Progressing)       Start:  03/13/25    Expected End:  04/24/25             Improve FOTO score by 50% (Progressing)       Start:  03/13/25    Expected End:  04/24/25            Return to PLF (Progressing)       Start:  03/13/25    Expected End:  04/24/25               STG (3 weeks)       Decrease worst pain to 4/10 (Progressing)       Start:  03/13/25    Expected End:  04/03/25            Improve LE MMT by 1/2 grade (Progressing)       Start:  03/13/25    Expected End:  04/03/25            Report compliance with all recommendations (Progressing)       Start:  03/13/25    Expected End:  04/03/25            Improve FOTO score by 25% (Progressing)       Start:  03/13/25    Expected End:  04/03/25                Elvira Ferguson, PT

## 2025-04-22 ENCOUNTER — CLINICAL SUPPORT (OUTPATIENT)
Dept: REHABILITATION | Facility: HOSPITAL | Age: 65
End: 2025-04-22
Payer: MEDICARE

## 2025-04-22 DIAGNOSIS — M25.561 CHRONIC PAIN OF BOTH KNEES: Primary | ICD-10-CM

## 2025-04-22 DIAGNOSIS — M70.51 PES ANSERINUS BURSITIS OF BOTH KNEES: ICD-10-CM

## 2025-04-22 DIAGNOSIS — M25.562 CHRONIC PAIN OF BOTH KNEES: Primary | ICD-10-CM

## 2025-04-22 DIAGNOSIS — M70.52 PES ANSERINUS BURSITIS OF BOTH KNEES: ICD-10-CM

## 2025-04-22 DIAGNOSIS — G89.29 CHRONIC PAIN OF BOTH KNEES: Primary | ICD-10-CM

## 2025-04-22 PROCEDURE — 97112 NEUROMUSCULAR REEDUCATION: CPT | Mod: PN

## 2025-04-22 PROCEDURE — 97530 THERAPEUTIC ACTIVITIES: CPT | Mod: PN

## 2025-04-22 PROCEDURE — 97150 GROUP THERAPEUTIC PROCEDURES: CPT | Mod: PN

## 2025-04-22 NOTE — PROGRESS NOTES
Outpatient Rehab    Physical Therapy Visit    Patient Name: Rosetta Toro  MRN: [id number]  YOB: 1960  Encounter Date: 4/22/2025    Therapy Diagnosis:   Encounter Diagnoses   Name Primary?    Chronic pain of both knees Yes    Pes anserinus bursitis of both knees      Physician: Irvin Gonzalez,*    Physician Orders: Eval and Treat  Medical Diagnosis: Pain in both knees, unspecified chronicity    Visit # / Visits Authorized:  11 / 12  Insurance Authorization Period: 3/19/2025 to 7/30/2025  Date of Evaluation: 3/13/25  Plan of Care Certification: 3/13/25 to 4/24/25     PT/PTA:     Number of PTA visits since last PT visit:   Time In: 0800   Time Out: 0855  Total Time: 55   Total Billable Time: 40; split billing    FOTO:  Intake Score:  %  Survey Score 1:  %  Survey Score 2:  %         Subjective   Notices improved flexibility and has more confidence in her legs..         Objective            Treatment:  Therapeutic Exercise  TE 1: Slantboard 3 x 30 sec  TE 2: Seated LAQ bilat;x 25 ea; 4#  TE 3: Supine Hamstring stretch w/ strap; bilat; 3 x 15 sec ea  TE 4: Single leg press; bilat; 25 each; 50#  TE 5: Standing TKE w/ black strap x 30 ea  TE 6: Standing quad stretch 3 x 15 sec ea  TE 7: Seated foot supination w/ band (green) x 25 ea  TE 8: Bilat. IT band stretch 3 x 15 sec  Balance/Neuromuscular Re-Education  NMR 1: Lateral step up L-2 x 25 each  NMR 2: Heel raises x 25  NMR 4: Wall squat against ball w/ ball at knees x 25  NMR 5: Fwd lunge x 25 ea  NMR 6: SLR x 20ea; 4#  NMR 7: SAQ on tan roll x 25ea; 4#  Therapeutic Activity  TA 1: Nu-step#8; L-5 x 10 min  TA 2: Fwd step up; L-2; x30 each  TA 3: Eccentric step down L-2 x 15 bilat.  TA 4: Standing Hip Flex/Abd/Ext x 10 w/ band (green)  TA 5: Seated diagonal SLR w/ ball at knees x 25; bilat.; 0#    Time Entry(in minutes):  Group Therapy Time Entry: 15  Neuromuscular Re-Education Time Entry: 13  Therapeutic Activity Time Entry: 12  Therapeutic  Exercise Time Entry: 15    Assessment & Plan   Assessment: Patient did well with exercises; no exacerbations; VC's needed for technique. Billed time reflects one on one treatment.       Patient will continue to benefit from skilled outpatient physical therapy to address the deficits listed in the problem list box on initial evaluation, provide pt/family education and to maximize pt's level of independence in the home and community environment.     Patient's spiritual, cultural, and educational needs considered and patient agreeable to plan of care and goals.           Plan: Continue per POC and progress with strength and mobility exercises as patient tolerates    Goals:   Active       LTG ( 6weeks)       Decrease worst pain to 2/10 (Progressing)       Start:  03/13/25    Expected End:  04/24/25             Normal LE MMT ROM bilaterally (Progressing)       Start:  03/13/25    Expected End:  04/24/25             Improve FOTO score by 50% (Progressing)       Start:  03/13/25    Expected End:  04/24/25            Return to PLF (Progressing)       Start:  03/13/25    Expected End:  04/24/25               STG (3 weeks)       Decrease worst pain to 4/10 (Progressing)       Start:  03/13/25    Expected End:  04/03/25            Improve LE MMT by 1/2 grade (Progressing)       Start:  03/13/25    Expected End:  04/03/25            Report compliance with all recommendations (Progressing)       Start:  03/13/25    Expected End:  04/03/25            Improve FOTO score by 25% (Progressing)       Start:  03/13/25    Expected End:  04/03/25                Elvira Ferguson, PT

## 2025-04-24 ENCOUNTER — CLINICAL SUPPORT (OUTPATIENT)
Dept: REHABILITATION | Facility: HOSPITAL | Age: 65
End: 2025-04-24
Payer: MEDICARE

## 2025-04-24 DIAGNOSIS — M70.51 PES ANSERINUS BURSITIS OF BOTH KNEES: ICD-10-CM

## 2025-04-24 DIAGNOSIS — G89.29 CHRONIC PAIN OF BOTH KNEES: Primary | ICD-10-CM

## 2025-04-24 DIAGNOSIS — M70.52 PES ANSERINUS BURSITIS OF BOTH KNEES: ICD-10-CM

## 2025-04-24 DIAGNOSIS — M25.562 CHRONIC PAIN OF BOTH KNEES: Primary | ICD-10-CM

## 2025-04-24 DIAGNOSIS — M25.561 CHRONIC PAIN OF BOTH KNEES: Primary | ICD-10-CM

## 2025-04-24 PROCEDURE — 97112 NEUROMUSCULAR REEDUCATION: CPT | Mod: PN,CQ

## 2025-04-24 PROCEDURE — 97110 THERAPEUTIC EXERCISES: CPT | Mod: PN,CQ

## 2025-04-24 PROCEDURE — 97530 THERAPEUTIC ACTIVITIES: CPT | Mod: PN,CQ

## 2025-04-24 NOTE — PROGRESS NOTES
Outpatient Rehab    Physical Therapy Visit    Patient Name: Rosetta Toro  MRN: [id number]  YOB: 1960  Encounter Date: 4/24/2025    Therapy Diagnosis:   Encounter Diagnoses   Name Primary?    Chronic pain of both knees Yes    Pes anserinus bursitis of both knees      Physician: Irvin Gonzalez,*    Physician Orders: Eval and Treat  Medical Diagnosis: Pain in both knees, unspecified chronicity    Visit # / Visits Authorized:  12 / 12  Insurance Authorization Period: 3/19/2025 to 7/30/2025  Date of Evaluation: 3/13/2025  Plan of Care Certification: 3/13/2025 to 6/13/2025     PT/PTA:     Number of PTA visits since last PT visit:  1  Time In:   6:55 AM  Time Out:   8:00 AM  Total Time: 65 Minutes  Total Billable Time:   55 Minutes    FOTO:  Intake Score:  %  Survey Score 1:  %  Survey Score 2:  %         Subjective   I do not have any pain. I was able to help at my [employer] all day yesterday without any pain..  Pain reported as 0/10. Bilateral Knees    Objective            Treatment:  Therapeutic Exercise  TE 1: Slantboard 3 x 30 sec  TE 2: Seated LAQ bilat;x 25 ea; 4#  TE 3: Supine Hamstring stretch w/ strap; bilat; 3 x 15 sec ea  TE 4: Single leg press; bilat; 25 each; 50#  TE 5: Standing TKE w/ black strap x 30 ea  TE 6: Standing quad stretch 3 x 15 sec ea  TE 7: Seated foot supination w/ band (green) x 25 ea  TE 8: Bilat. IT band stretch 3 x 15 sec  Balance/Neuromuscular Re-Education  NMR 1: Lateral step up L-2 x 25 each  NMR 2: Heel raises x 25  NMR 4: Wall squat against ball w/ ball at knees x 25  NMR 5: Fwd lunge x 25 ea  NMR 6: SLR x 20ea; 4#  NMR 7: SAQ on tan roll x 25ea; 4#  Therapeutic Activity  TA 1: Nu-step#8; L-5 x 15 min  TA 2: Fwd step up; L-2; x 30 each  TA 3: Eccentric step down L-2 x 15 bilat.  TA 4: Standing Hip Flex/Abd/Ext x 10 w/ band (green)  TA 5: Seated diagonal SLR w/ ball at knees x 25; bilat.; 0#  TA 6: Bridges x 15    Time Entry(in minutes):       Assessment & Plan    Assessment: Patient did well with exercises; no exacerbations; has made good progress with strength and mobility and is now ready to transition to independent exercise program       Patient will continue to benefit from skilled outpatient physical therapy to address the deficits listed in the problem list box on initial evaluation, provide pt/family education and to maximize pt's level of independence in the home and community environment.     Patient's spiritual, cultural, and educational needs considered and patient agreeable to plan of care and goals.           Plan: Recommend D/C from skilled PT at this time    Goals:   Active       LTG ( 6weeks)       Decrease worst pain to 2/10 (Met)       Start:  03/13/25    Expected End:  04/24/25    Resolved:  04/24/25          Normal LE MMT ROM bilaterally (Progressing)       Start:  03/13/25    Expected End:  04/24/25             Improve FOTO score by 50% (Progressing)       Start:  03/13/25    Expected End:  04/24/25            Return to PLF (Progressing)       Start:  03/13/25    Expected End:  04/24/25               STG (3 weeks)       Decrease worst pain to 4/10 (Met)       Start:  03/13/25    Expected End:  04/03/25    Resolved:  04/24/25         Improve LE MMT by 1/2 grade (Progressing)       Start:  03/13/25    Expected End:  04/03/25            Report compliance with all recommendations (Met)       Start:  03/13/25    Expected End:  04/03/25    Resolved:  04/24/25         Improve FOTO score by 25% (Progressing)       Start:  03/13/25    Expected End:  04/03/25                Jonathan Favre, PTA

## 2025-04-25 ENCOUNTER — PATIENT MESSAGE (OUTPATIENT)
Dept: ADMINISTRATIVE | Facility: HOSPITAL | Age: 65
End: 2025-04-25
Payer: MEDICARE

## 2025-05-15 ENCOUNTER — ANESTHESIA EVENT (OUTPATIENT)
Dept: SURGERY | Facility: HOSPITAL | Age: 65
End: 2025-05-15
Payer: MEDICARE

## 2025-05-15 NOTE — ANESTHESIA PREPROCEDURE EVALUATION
05/15/2025  Rosetta Toro is a 65 y.o., female.   has a past surgical history that includes Myomectomy; eximer laser; and Eye surgery (May 31, 2002).       Pre-op Assessment    I have reviewed the Patient Summary Reports.     I have reviewed the Nursing Notes. I have reviewed the NPO Status.   I have reviewed the Medications.     Review of Systems  Anesthesia Hx:  No problems with previous Anesthesia             Denies Family Hx of Anesthesia complications.    Denies Personal Hx of Anesthesia complications.                    Social:  Non-Smoker       Hematology/Oncology:  Hematology Normal   Oncology Normal                                   EENT/Dental:  chronic allergic rhinitis           Cardiovascular:                hyperlipidemia                               Pulmonary:  Pulmonary Normal                       Renal/:  Renal/ Normal                 Hepatic/GI:       Hepatitis, A              Musculoskeletal:  Arthritis               Neurological:  Neurology Normal                                      Endocrine:  Endocrine Normal            Psych:  Psychiatric Normal                  Physical Exam  General: Well nourished, Cooperative, Alert and Oriented    Airway:  Mallampati: II   Mouth Opening: Normal  TM Distance: Normal  Tongue: Normal  Neck ROM: Normal ROM    Dental:  Intact    Chest/Lungs:  Clear to auscultation, Normal Respiratory Rate    Heart:  Rate: Normal  Rhythm: Regular Rhythm    Anesthesia Plan  Type of Anesthesia, risks & benefits discussed:    Anesthesia Type: MAC  Intra-op Monitoring Plan: Standard ASA Monitors  Post Op Pain Control Plan: IV/PO Opioids PRN  Induction:  IV  Informed Consent: Informed consent signed with the Patient and all parties understand the risks and agree with anesthesia plan.  All questions answered.   ASA Score: 2  Day of Surgery Review of History &  Physical: H&P Update referred to the surgeon/provider.    Ready For Surgery From Anesthesia Perspective.   .

## 2025-05-16 ENCOUNTER — HOSPITAL ENCOUNTER (OUTPATIENT)
Facility: HOSPITAL | Age: 65
Discharge: HOME | End: 2025-05-16
Attending: INTERNAL MEDICINE | Admitting: INTERNAL MEDICINE
Payer: MEDICARE

## 2025-05-16 ENCOUNTER — ANESTHESIA (OUTPATIENT)
Dept: SURGERY | Facility: HOSPITAL | Age: 65
End: 2025-05-16
Payer: MEDICARE

## 2025-05-16 VITALS
HEART RATE: 65 BPM | WEIGHT: 129 LBS | SYSTOLIC BLOOD PRESSURE: 113 MMHG | TEMPERATURE: 97 F | RESPIRATION RATE: 12 BRPM | HEIGHT: 62 IN | DIASTOLIC BLOOD PRESSURE: 56 MMHG | OXYGEN SATURATION: 99 % | BODY MASS INDEX: 23.74 KG/M2

## 2025-05-16 PROCEDURE — 63600175 PHARM REV CODE 636 W HCPCS: Performed by: SPECIALIST

## 2025-05-16 PROCEDURE — 37000008 HC ANESTHESIA 1ST 15 MINUTES: Performed by: INTERNAL MEDICINE

## 2025-05-16 PROCEDURE — 88305 TISSUE EXAM BY PATHOLOGIST: CPT | Mod: TC | Performed by: INTERNAL MEDICINE

## 2025-05-16 PROCEDURE — 45385 COLONOSCOPY W/LESION REMOVAL: CPT | Mod: PT | Performed by: INTERNAL MEDICINE

## 2025-05-16 PROCEDURE — 63600175 PHARM REV CODE 636 W HCPCS: Performed by: NURSE ANESTHETIST, CERTIFIED REGISTERED

## 2025-05-16 PROCEDURE — 27201423 OPTIME MED/SURG SUP & DEVICES STERILE SUPPLY: Performed by: INTERNAL MEDICINE

## 2025-05-16 PROCEDURE — 37000009 HC ANESTHESIA EA ADD 15 MINS: Performed by: INTERNAL MEDICINE

## 2025-05-16 RX ORDER — SODIUM CHLORIDE, SODIUM LACTATE, POTASSIUM CHLORIDE, CALCIUM CHLORIDE 600; 310; 30; 20 MG/100ML; MG/100ML; MG/100ML; MG/100ML
INJECTION, SOLUTION INTRAVENOUS CONTINUOUS
Status: DISCONTINUED | OUTPATIENT
Start: 2025-05-16 | End: 2025-05-16 | Stop reason: HOSPADM

## 2025-05-16 RX ORDER — ONDANSETRON HYDROCHLORIDE 2 MG/ML
4 INJECTION, SOLUTION INTRAVENOUS DAILY PRN
Status: DISCONTINUED | OUTPATIENT
Start: 2025-05-16 | End: 2025-05-16 | Stop reason: HOSPADM

## 2025-05-16 RX ORDER — SODIUM CHLORIDE, SODIUM LACTATE, POTASSIUM CHLORIDE, CALCIUM CHLORIDE 600; 310; 30; 20 MG/100ML; MG/100ML; MG/100ML; MG/100ML
125 INJECTION, SOLUTION INTRAVENOUS CONTINUOUS
Status: DISCONTINUED | OUTPATIENT
Start: 2025-05-16 | End: 2025-05-16 | Stop reason: HOSPADM

## 2025-05-16 RX ORDER — HYDROMORPHONE HYDROCHLORIDE 2 MG/ML
0.5 INJECTION, SOLUTION INTRAMUSCULAR; INTRAVENOUS; SUBCUTANEOUS EVERY 5 MIN PRN
Status: DISCONTINUED | OUTPATIENT
Start: 2025-05-16 | End: 2025-05-16 | Stop reason: HOSPADM

## 2025-05-16 RX ORDER — LIDOCAINE HYDROCHLORIDE 10 MG/ML
1 INJECTION, SOLUTION EPIDURAL; INFILTRATION; INTRACAUDAL; PERINEURAL ONCE
Status: DISCONTINUED | OUTPATIENT
Start: 2025-05-16 | End: 2025-05-16 | Stop reason: HOSPADM

## 2025-05-16 RX ORDER — OXYCODONE HYDROCHLORIDE 5 MG/1
5 TABLET ORAL ONCE AS NEEDED
Status: DISCONTINUED | OUTPATIENT
Start: 2025-05-16 | End: 2025-05-16 | Stop reason: HOSPADM

## 2025-05-16 RX ORDER — LIDOCAINE HYDROCHLORIDE 20 MG/ML
INJECTION, SOLUTION EPIDURAL; INFILTRATION; INTRACAUDAL; PERINEURAL
Status: DISCONTINUED | OUTPATIENT
Start: 2025-05-16 | End: 2025-05-16

## 2025-05-16 RX ORDER — PROPOFOL 10 MG/ML
VIAL (ML) INTRAVENOUS
Status: DISCONTINUED | OUTPATIENT
Start: 2025-05-16 | End: 2025-05-16

## 2025-05-16 RX ORDER — GLUCAGON 1 MG
1 KIT INJECTION
Status: DISCONTINUED | OUTPATIENT
Start: 2025-05-16 | End: 2025-05-16 | Stop reason: HOSPADM

## 2025-05-16 RX ADMIN — PROPOFOL 50 MG: 10 INJECTION, EMULSION INTRAVENOUS at 08:05

## 2025-05-16 RX ADMIN — LIDOCAINE HYDROCHLORIDE 100 MG: 20 INJECTION, SOLUTION EPIDURAL; INFILTRATION; INTRACAUDAL; PERINEURAL at 08:05

## 2025-05-16 RX ADMIN — SODIUM CHLORIDE, POTASSIUM CHLORIDE, SODIUM LACTATE AND CALCIUM CHLORIDE 500 ML: 600; 310; 30; 20 INJECTION, SOLUTION INTRAVENOUS at 09:05

## 2025-05-16 RX ADMIN — PROPOFOL 100 MG: 10 INJECTION, EMULSION INTRAVENOUS at 08:05

## 2025-05-16 RX ADMIN — SODIUM CHLORIDE, POTASSIUM CHLORIDE, SODIUM LACTATE AND CALCIUM CHLORIDE: 600; 310; 30; 20 INJECTION, SOLUTION INTRAVENOUS at 08:05

## 2025-05-16 NOTE — H&P
H&P update:  Patient presents this morning for endoscopy as scheduled.  I have reviewed the case with the patient and there are no changes since the clinic visit.  We have discussed risk and benefit associated with endoscopy and patient wishes to proceed.  ------------------------------------------------------------------------------------------------------------    Impressions:   1. Screening for colon cancer       Recommendations:  --Colonoscopy indications: Screening;   The patient is an appropriate candidate for Colonoscopy. I discussed with the patient the bowel prep as well as the benefits, risks, indications and, alternatives to Colonoscopy including the risks of perforation and bleeding among other risks. The patient understands and wishes to proceed.     --Follow up in GI Clinic in 2 weeks, s/p procedure.    Orders Placed This Encounter   Medications   sodium, potassium, & magnesium sulfate (SUPREP BOWEL PREP KIT) 17.5-3.13-1.6 gram Recon Soln   Sig: 3 quarts take split dose as directed: Drink 16 ounces of milk solution x1, then drink 32 ounces of water over 1 hour evening before procedure; on day of procedure in 10 to 12 hours after first dose, drink 16 ounces of mixed elation x1, then drink 32 ounces of water, complete regimen at least 2 hours before procedure. Indications: emptying of the bowel   Dispense: 354 mL   Refill: 0     Orders Placed This Encounter   Procedures   ASC Colonoscopy     ---------------------------------------------------------------------    Chief complaint: Screening colonoscopy     History of present illness: This is a new patient, a 65-year-old female referred by her PCP, Dr. Evans, in consultation for evaluation of screening colonoscopy. She denies previous colonoscopy. She denies a family history of colon cancer. She reports having normal bowel habits. She denies Red flags: Persistent rectal bleeding for 6 weeks without anal symptoms (>60 yrs). Change in bowel habit to looser  stools/increased frequency for 6 weeks (>60 yrs). Change in bowel habit to looser stools/increased frequency and rectal bleeding (>60 yrs). Palpable right iliac fossa mass. Palpable rectal mass (intraluminal). Unexplained iron deficiency anemia. We discussed plans to proceed with screening colonoscopy and see her back in the clinic after for results and recommendations and she agrees.     Review of systems: 12 point review of systems was negative except as documented above.    Outpatient medications: Personally reviewed and documented in the EHR  Current Outpatient Medications   Medication Instructions   atorvastatin (LIPITOR) 20 mg, Daily   cholecalciferol (VITAMIN D3) 5,000 Units   estradioL (ESTRACE) 2 g   fluticasone propionate (FLONASE) 50 mcg/actuation nasal spray 1 spray, Daily   sodium, potassium, & magnesium sulfate (SUPREP BOWEL PREP KIT) 17.5-3.13-1.6 gram Recon Soln 3 quarts take split dose as directed: Drink 16 ounces of milk solution x1, then drink 32 ounces of water over 1 hour evening before procedure; on day of procedure in 10 to 12 hours after first dose, drink 16 ounces of mixed elation x1, then drink 32 ounces of water, complete regimen at least 2 hours before procedure.       Past Medical History: Personally reviewed and documented in the EHR  Past Medical History[1]      Family history:   History - Family[2]       Physical examination:   Vital Signs: Current vital signs reviewed and documented above  General Appearance: Well-appearing. Not acutely ill.  Head: Normocephalic.   Eyes: No scleral icterus. No scleral injection. No conjunctival pallor.   Lungs: Respiration rhythm and depth was normal.  Cardiac: Regular rate and rhythm. No murmur.   Abdomen: Abdomen was not distended. Abdominal palpation revealed no tenderness. Abdominal auscultation revealed positive bowel sounds.  Neurological: Level of consciousness was normal. Speech was normal.  Skin: General appearance was normal. Color and  "pigmentation were normal. No skin lesions.    Laboratory: Personally reviewed  Outside labs reviewed, [facility], 3/05/2025:  Hgb:  Plt:  BUN:13  Cr: 0.8  AST: 25  ALT: 23  Alk Phos: 63  T. Bili: 0.5  No results found for: "WBC", "HGB", "HCT", "MCV", "MCH", "MCHC", "RDW", "LABPLAT" No results found for: "NA", "K", "CL", "CO2", "BUN", "CRE", "GLU", "AGAP", "EGFR", "EGFRAA", "AST", "ALT", "TP", "TBIL"     Radiology: Personally reviewed    Prior Endoscopy: None on record.   Denies previous colonoscopy or EGD.         [1] History reviewed. No pertinent past medical history.  [2]   Family History  Problem Relation Age of Onset   Stomach cancer Mother   Colon polyps Father   "

## 2025-05-16 NOTE — TRANSFER OF CARE
"Anesthesia Transfer of Care Note    Patient: Rosetta Toro    Procedure(s) Performed: Procedure(s) (LRB):  COLONOSCOPY (N/A)    Patient location: PACU    Anesthesia Type: general    Transport from OR: Transported from OR on room air with adequate spontaneous ventilation    Post pain: adequate analgesia    Post assessment: no apparent anesthetic complications and tolerated procedure well    Post vital signs: stable    Level of consciousness: sedated and responds to stimulation    Nausea/Vomiting: no nausea/vomiting    Complications: none    Transfer of care protocol was followed      Last vitals: Visit Vitals  /61 (Patient Position: Lying)   Temp 36.6 °C (97.8 °F) (Temporal)   Resp 16   Ht 5' 2" (1.575 m)   Wt 58.5 kg (128 lb 15.5 oz)   LMP  (LMP Unknown)   SpO2 98%   Breastfeeding No   BMI 23.59 kg/m²     "

## 2025-05-16 NOTE — PLAN OF CARE
Discharge instructions reviewed with patient and spouse. No questions or concerns at this time. IV removed prior. Pt transported via wheelchair to POV. Pt holding conversation. No noted distress.

## 2025-05-16 NOTE — ANESTHESIA POSTPROCEDURE EVALUATION
Anesthesia Post Evaluation    Patient: Rosetta Toro    Procedure(s) Performed: Procedure(s) (LRB):  COLONOSCOPY (N/A)    Final Anesthesia Type: MAC      Patient location during evaluation: PACU  Patient participation: Yes- Able to Participate  Level of consciousness: awake and alert and oriented  Post-procedure vital signs: reviewed and stable  Pain management: adequate  Airway patency: patent    PONV status at discharge: No PONV  Anesthetic complications: no      Cardiovascular status: blood pressure returned to baseline and hemodynamically stable  Respiratory status: spontaneous ventilation and room air  Hydration status: euvolemic  Follow-up not needed.            Vitals Value Taken Time   BP 80/43 05/16/25 09:14   Temp 97 05/16/25 09:15   Pulse 82 05/16/25 09:15   Resp 19 05/16/25 09:15   SpO2 97 % 05/16/25 09:15   Vitals shown include unfiled device data.      No case tracking events are documented in the log.      Pain/Timo Score: Timo Score: 7 (5/16/2025  9:06 AM)

## 2025-05-16 NOTE — [employer] PATIENT INSTRUCTIONS
Discharge Summary/Instructions after an Endoscopic Procedure  Patient Name: Rosetta Toro  Patient MRN: [id number]  Patient YOB: 1960  Friday, May 16, 2025  Sal Florence MD  RESTRICTIONS:  During your procedure today, you received medications for sedation.  These   medications may affect your judgment, balance and coordination.  Therefore,   for 24 hours, you have the following restrictions:   - DO NOT drive a car, operate machinery, make legal/financial decisions,   sign important papers or drink alcohol.    ACTIVITY:  Today: no heavy lifting, straining or running due to procedural   sedation/anesthesia.  The following day: return to full activity including work.  DIET:  Eat and drink normally unless instructed otherwise.     TREATMENT FOR COMMON SIDE EFFECTS:  - Mild abdominal pain, nausea, belching, bloating or excessive gas:  rest,   eat lightly and use a heating pad.  - Sore Throat: treat with throat lozenges and/or gargle with warm salt   water.  - Because air was used during the procedure, expelling large amounts of air   from your rectum or belching is normal.  - If a bowel prep was taken, you may not have a bowel movement for 1-3 days.    This is normal.  SYMPTOMS TO WATCH FOR AND REPORT TO YOUR PHYSICIAN:  1. Abdominal pain or bloating, other than gas cramps.  2. Chest pain.  3. Back pain.  4. Signs of infection such as: chills or fever occurring within 24 hours   after the procedure.  5. Rectal bleeding, which would show as bright red, maroon, or black stools.   (A tablespoon of blood from the rectum is not serious, especially if   hemorrhoids are present.)  6. Vomiting.  7. Weakness or dizziness.  GO DIRECTLY TO THE NEAREST EMERGENCY ROOM IF YOU HAVE ANY OF THE FOLLOWING:      Difficulty breathing              Chills and/or fever over 101 F   Persistent vomiting and/or vomiting blood   Severe abdominal pain   Severe chest pain   Black, tarry stools   Bleeding- more than one  tablespoon   Any other symptom or condition that you feel may need urgent attention  Your doctor recommends these additional instructions:  If any biopsies were taken, your doctors clinic will contact you in 1 to 2   weeks with any results.  - Discharge patient to home (ambulatory).   - Patient has a contact number available for emergencies.  The signs and   symptoms of potential delayed complications were discussed with the   patient.  Return to normal activities tomorrow.  Written discharge   instructions were provided to the patient.   - Resume previous diet.   - Continue present medications.   - Return to primary care physician as previously scheduled.   - Repeat colonoscopy in 3 years for surveillance.   - Refer to dermatologist at the next available appointment.  For questions, problems or results please call your physician - Sal Florence MD at Work:  [phone].  [facility] EMERGENCY ROOM PHONE NUMBER: [phone]  IF A COMPLICATION OR EMERGENCY SITUATION ARISES AND YOU ARE UNABLE TO REACH   YOUR PHYSICIAN - GO DIRECTLY TO THE EMERGENCY ROOM.  Sal Florence MD  5/16/2025 9:06:01 AM  This report has been verified and signed electronically.  Dear patient,  As a result of recent federal legislation (The Federal Cures Act), you may   receive lab or pathology results from your procedure in your [facility]   account before your physician is able to contact you. Your physician or   their representative will relay the results to you with their   recommendations at their soonest availability.  Thank you,  [employer]

## 2025-05-16 NOTE — PLAN OF CARE
Dr. Florence at bedside explaining procedural findings and discharge. Questions and concerns addressed at this time. Pt's  at bedside. No noted distress.

## 2025-05-19 LAB
ESTROGEN SERPL-MCNC: NORMAL PG/ML
INSULIN SERPL-ACNC: NORMAL U[IU]/ML
LAB AP GROSS DESCRIPTION: NORMAL
LAB AP PERFORMING LOCATION(S): NORMAL
LAB AP REPORT FOOTNOTES: NORMAL

## 2025-08-05 ENCOUNTER — OFFICE VISIT (OUTPATIENT)
Dept: NEUROLOGY | Facility: CLINIC | Age: 65
End: 2025-08-05
Payer: MEDICARE

## 2025-08-05 VITALS
DIASTOLIC BLOOD PRESSURE: 57 MMHG | SYSTOLIC BLOOD PRESSURE: 129 MMHG | WEIGHT: 127.88 LBS | BODY MASS INDEX: 23.53 KG/M2 | HEIGHT: 62 IN | HEART RATE: 81 BPM

## 2025-08-05 DIAGNOSIS — Z81.8 FAMILY HISTORY OF DEMENTIA: ICD-10-CM

## 2025-08-05 PROCEDURE — 99999 PR PBB SHADOW E&M-EST. PATIENT-LVL IV: CPT | Mod: PBBFAC,,, | Performed by: INTERNAL MEDICINE

## 2025-08-05 PROCEDURE — 99204 OFFICE O/P NEW MOD 45 MIN: CPT | Mod: S$GLB,,, | Performed by: INTERNAL MEDICINE

## 2025-08-05 NOTE — PROGRESS NOTES
GENERAL NEUROLOGY VISIT   08/05/2025  History:    Patient is a 65 y.o. female with past medical history of hyperlipidemia, osteoarthritis presenting to the clinic for evaluation of cognitive impairment.  History obtained from patient and chart review.    History of Present Illness    Patient is a 65-year-old retired  seeking a baseline cognitive assessment due to family history of dementia. She reports no current memory issues but desires to establish a baseline for future comparison. Her uncle developed dementia in his 70s, but there is no other family history of dementia. Her father lived to 95, her mother is currently 93, and she has a 100-year-old aunt. She occasionally has momentary forgetfulness when entering a room but quickly recalls her purpose upon returning. She manages family finances without difficulty and has no problems with daily activities or driving. She retired approximately 10 years ago. Khmer is her first language, which may have affected her performance on some cognitive tests during the visit. She reports snoring during sleep but does not feel fatigued when waking. She enjoys midday naps, which she attributes to cultural habits from living in [place]. She denies short-term memory problems, trouble with cooking, issues with finances, problems with driving, and difficulty with daily tasks.    ROS:  ROS findings as noted in HPI.         Past Medical History:   Diagnosis Date    Chicken pox     Hepatitis A     fatty liver    Hyperlipidemia     Osteoarthritis     Shingles        Past Surgical History:   Procedure Laterality Date    COLONOSCOPY N/A 5/16/2025    Procedure: COLONOSCOPY;  Surgeon: Sal Florence MD;  Location: [facility];  Service: Endoscopy;  Laterality: N/A;    eximer laser      EYE SURGERY  May 31, 2002    Excimer Laser    MYOMECTOMY         Social History[1]    Review of patient's allergies indicates:   Allergen Reactions    Meloxicam Hives    Merthiolate (benzalkonium)  "[benzalkonium chloride]        Medications Ordered Prior to Encounter[2]     Family history:  As abv    Review Of Systems     Constitutional Negative for fevers, chills, weigh loss   HEENT Negative for hearing loss, dysphagia, sore throat, diplopia   Respiratory Negative for shortness of breath, cough    Cardiovascular Negative for chest pain, palpitations    Gastrointestinal Negative for constipation, diarrhea, early satiety    Skin Negative for rashes    Musculoskeletal Negative for joint pains, myalgias.   Neurological See Above    Psychological Negative for sleep disturbances.    Heme/Lymph Negative for easy bruising, easy bleeding    Endocrine Negative for polyuria, polydypsia     Physical Exam:     Physical Examination  BP (!) 129/57 (BP Location: Left arm, Patient Position: Sitting)   Pulse 81   Ht 5' 2" (1.575 m)   Wt 58 kg (127 lb 13.9 oz)   LMP  (LMP Unknown)   BMI 23.39 kg/m²   Body mass index is 23.39 kg/m².      Neurological Exam  Mental Status:   Alert and oriented to name, date, location, president.   Able to spell WORLD backwards  Able to do serial 7s upto 79  2/3 word recall    CN:   II, III, IV, VI: PERRL, EOMI, no nystagmus, fundus not visualized due to inadequate dilation.V: intact to fine touch, temperature, pain.VII: symmetrical facial movement, nice smile, no drooping.VIII: grossly intact to hearing XII: tongue midline    Motor:   5/5 in all 4 extremities, no drift                Reflexes:   No Clonus, down going toes b/l.    Sensation: on both UEs and LEs    Intact to all modalities tested    Coordination:    Tremor: Absent.    Gait:    Normal      Impression:   #family history of dementia  MOCA was initially conducted but score thought to be inconsistent due to language barrier.  Did okay on MMSE today.  Patient is completely independent of basic as well as instrumental ADLs at this time without any concerning trouble with daily functioning requiring memory evaluation.  She has no " concern for any dementia process at this time.  Does have a maternal uncle with history of dementia in his 70s.  Discussed with the patient that evaluation today does not predict future emergence of a neurodegenerative condition.  We will proceed with obtaining P tau 181 and vitamin B12 levels.  TSH within normal limit.  Have ordered neurocognitive testing as well and patient will complete the testing depending on the co-pay.    Plan:   Family history of dementia  -     Ambulatory referral/consult to Neurology  -     [id number], Plasma; Future; Expected date: 08/05/2025  -     Vitamin B12; Future; Expected date: 08/05/2025  -     Ambulatory referral/consult to Adult Neuropsychology; Future; Expected date: 08/12/2025          RTC as needed    Disclaimer: This note was prepared using a voice recognition system and is likely to have sound alike errors.  Please call me with any questions.    Rachel Magallon MD  Neurology         [1]   Social History  Socioeconomic History    Marital status:    Tobacco Use    Smoking status: Former     Current packs/day: 0.00     Types: Cigarettes    Smokeless tobacco: Former   Substance and Sexual Activity    Alcohol use: No    Drug use: No    Sexual activity: Never     Social Drivers of Health     Financial Resource Strain: Low Risk  (3/4/2025)    Overall Financial Resource Strain (CARDIA)     Difficulty of Paying Living Expenses: Not hard at all   Food Insecurity: No Food Insecurity (3/4/2025)    Hunger Vital Sign     Worried About Running Out of Food in the Last Year: Never true     Ran Out of Food in the Last Year: Never true   Transportation Needs: No Transportation Needs (3/4/2025)    PRAPARE - Transportation     Lack of Transportation (Medical): No     Lack of Transportation (Non-Medical): No   Physical Activity: Insufficiently Active (3/4/2025)    Exercise Vital Sign     Days of Exercise per Week: 3 days     Minutes of Exercise per Session: 40 min   Stress: No Stress  Concern Present (3/4/2025)    [employer] [place] [employer] - Occupational Stress Questionnaire     Feeling of Stress : Not at all   Housing Stability: Low Risk  (3/4/2025)    Housing Stability Vital Sign     Unable to Pay for Housing in the Last Year: No     Number of Times Moved in the Last Year: 0     Homeless in the Last Year: No   [2]   Current Outpatient Medications on File Prior to Visit   Medication Sig Dispense Refill    atorvastatin (LIPITOR) 20 MG tablet Take 1 tablet (20 mg total) by mouth once daily. 90 tablet 3    cholecalciferol, vitamin D3, 5,000 unit Tab Take 5,000 Units by mouth once daily.      estradioL (ESTRACE) 0.01 % (0.1 mg/gram) vaginal cream Place a pea-sized amount inside vagina every night for 1 week, and then 2-3 times a week. 42.5 g 6    fluticasone propionate (FLONASE) 50 mcg/actuation nasal spray 1 spray (50 mcg total) by Each Nostril route once daily. 48 mL 2    mupirocin (BACTROBAN) 2 % ointment Apply 1 gram tid 30 g 0     No current facility-administered medications on file prior to visit.

## 2025-08-06 ENCOUNTER — PATIENT MESSAGE (OUTPATIENT)
Dept: NEUROLOGY | Facility: CLINIC | Age: 65
End: 2025-08-06
Payer: MEDICARE

## 2025-08-06 RX ORDER — VIT C/E/ZN/COPPR/LUTEIN/ZEAXAN 250MG-90MG
500 CAPSULE ORAL DAILY
Qty: 120 TABLET | Refills: 1 | Status: SHIPPED | OUTPATIENT
Start: 2025-08-06

## (undated) DEVICE — GLOVE SENSICARE PI SURG 6.5

## (undated) DEVICE — GLOVE SENSICARE PI SURG 7.5

## (undated) DEVICE — SNARE SNAREMASTER PLUS 15MM

## (undated) DEVICE — ENDOKIT

## (undated) DEVICE — MANIFOLD 4 PORT

## (undated) DEVICE — Device

## (undated) DEVICE — TRAPEASE